# Patient Record
Sex: MALE | Race: WHITE | NOT HISPANIC OR LATINO | Employment: OTHER | URBAN - METROPOLITAN AREA
[De-identification: names, ages, dates, MRNs, and addresses within clinical notes are randomized per-mention and may not be internally consistent; named-entity substitution may affect disease eponyms.]

---

## 2017-02-08 ENCOUNTER — OFFICE VISIT (OUTPATIENT)
Dept: CARDIOLOGY | Facility: MEDICAL CENTER | Age: 67
End: 2017-02-08
Payer: MEDICARE

## 2017-02-08 VITALS
WEIGHT: 288 LBS | HEIGHT: 74 IN | DIASTOLIC BLOOD PRESSURE: 70 MMHG | SYSTOLIC BLOOD PRESSURE: 132 MMHG | HEART RATE: 72 BPM | BODY MASS INDEX: 36.96 KG/M2

## 2017-02-08 DIAGNOSIS — E78.5 DYSLIPIDEMIA: ICD-10-CM

## 2017-02-08 DIAGNOSIS — I10 ESSENTIAL HYPERTENSION: ICD-10-CM

## 2017-02-08 DIAGNOSIS — R93.1 ELEVATED CORONARY ARTERY CALCIUM SCORE: ICD-10-CM

## 2017-02-08 PROCEDURE — 99214 OFFICE O/P EST MOD 30 MIN: CPT | Performed by: INTERNAL MEDICINE

## 2017-02-08 PROCEDURE — 4040F PNEUMOC VAC/ADMIN/RCVD: CPT | Mod: 8P | Performed by: INTERNAL MEDICINE

## 2017-02-08 PROCEDURE — G8432 DEP SCR NOT DOC, RNG: HCPCS | Performed by: INTERNAL MEDICINE

## 2017-02-08 PROCEDURE — G8419 CALC BMI OUT NRM PARAM NOF/U: HCPCS | Performed by: INTERNAL MEDICINE

## 2017-02-08 PROCEDURE — G8484 FLU IMMUNIZE NO ADMIN: HCPCS | Performed by: INTERNAL MEDICINE

## 2017-02-08 PROCEDURE — 3017F COLORECTAL CA SCREEN DOC REV: CPT | Mod: 8P | Performed by: INTERNAL MEDICINE

## 2017-02-08 PROCEDURE — 1036F TOBACCO NON-USER: CPT | Performed by: INTERNAL MEDICINE

## 2017-02-08 PROCEDURE — 1101F PT FALLS ASSESS-DOCD LE1/YR: CPT | Mod: 8P | Performed by: INTERNAL MEDICINE

## 2017-02-08 PROCEDURE — G8598 ASA/ANTIPLAT THER USED: HCPCS | Performed by: INTERNAL MEDICINE

## 2017-02-08 NOTE — PROGRESS NOTES
Subjective:   Davie Rodriguez is a 66 y.o. male who presents today for followup of his hypertension and hyperlipidemia. He also has diabetes, obstructive sleep apnea and mild renal insufficiency.    His blood pressures at home and been fairly labile. They generally run approximately 130-160/70-80. However, he's had blood pressures as high as 190/124 and as low as 110/70.    He's noted no chest discomfort, dyspnea, edema, palpitations or lightheadedness.    Past Medical History   Diagnosis Date   • Diabetes    • Hypertension    • Renal disorder      STONES   • Hyperlipidemia      Past Surgical History   Procedure Laterality Date   • Other 1995     GALL BLADDER REMOVED   • Recovery  6/21/2010     Performed by SURGERY, IR-RECOVERY at SURGERY SAME DAY [facility] ORS   • Percutaneous nephrostolithotomy  6/23/2010     Performed by FOX GARCIA at SURGERY [facility]     Family History   Problem Relation Age of Onset   • Heart Attack Father 57   • Other Sister 72     diabetes     History   Smoking status   • Never Smoker    Smokeless tobacco   • Never Used     Allergies   Allergen Reactions   • Penicillins Swelling     Outpatient Encounter Prescriptions as of 2/8/2017   Medication Sig Dispense Refill   • enalapril (VASOTEC) 10 MG Tab Take 1 Tab by mouth 2 times a day. 180 Tab 3   • atorvastatin (LIPITOR) 40 MG Tab Take 1 Tab by mouth every evening. 90 Tab 3   • omega-3 acid ethyl esters (LOVAZA) 1 GM capsule Take 2 capsules twice daily. 360 Cap 3   • fenofibrate (TRICOR) 145 MG Tab Take 1 Tab by mouth every day. 90 Tab 3   • potassium citrate SR (UROCIT-K SR) 10 MEQ (1080 MG) Tab CR Take 2 Tabs by mouth 3 times a day. 550 Tab 3   • carvedilol (COREG) 25 MG Tab Take 1 Tab by mouth 2 times a day, with meals. 180 Tab 3   • nortriptyline (PAMELOR) 25 MG Cap Take 25 mg by mouth every evening.     • Insulin Glargine (LANTUS SC) Inject  as instructed.     • Testosterone (AXIRON TD) Apply  to skin as directed.   "   • finasteride (PROSCAR) 5 MG TABS Take 5 mg by mouth every day.     • Exenatide (BYDUREON SC) Inject  as instructed. Q 7 DAYS     • aspirin 81 MG tablet Take 81 mg by mouth every day.     • glimepiride (AMARYL) 4 MG TABS Take 4 mg by mouth 2 Times a Day.     • Cholecalciferol (VITAMIN D) 2000 UNIT TABS Take  by mouth every day.     • NON SPECIFIED PARTH LOPIC ACID 200MG DAILY        No facility-administered encounter medications on file as of 2/8/2017.     ROS       Objective:   /70 mmHg  Pulse 72  Ht 1.88 m (6' 2\")  Wt 130.636 kg (288 lb)  BMI 36.96 kg/m2    Physical Exam   Neck: No JVD present.   Cardiovascular: Normal rate and regular rhythm.  Exam reveals no gallop.    No murmur heard.  Pulmonary/Chest: Effort normal. He has no rales.   Abdominal: Soft. There is no tenderness.   Musculoskeletal: He exhibits no edema.     Laboratory from Dr. Houston's office LDL 52.    Laboratory from October 27: BUN 31, creatinine 1.66 and GFR 42. LFTs were within normal limits.    Coronary calcium score:  Coronary calcification from March 2015:  LMA - 12.8  LCX - 31.8  LAD - 140.4  RCA - 67.0  PDA - 0.0  Calcium score:  252.0        Assessment:     1. Elevated coronary artery calcium score: 250 in March 2015     2. Essential hypertension     3. Dyslipidemia           Medical Decision Making:  Today's Assessment / Status / Plan:     Mr. Rodriguez is clinically stable. His blood pressure is quite labile. We did check his blood pressure device and it appears to be accurate. I would like to check his blood pressures on the right arm. We discussed how to do this in detail. If he has a blood pressure greater than 170, I would like him to repeated it on a couple of occasions over the next 10 minutes. He will bring his blood pressure cuff in at the time of his next visit and I will check it personally. His lipid status has been under good control and he is going to have it rechecked in March. He is asked to bring a copy of " that lab work in for review. He does have an elevated coronary calcium score but is asymptomatic from a cardiovascular standpoint. He will follow-up in about 3 months.

## 2017-02-08 NOTE — MR AVS SNAPSHOT
"        Davie Rodriguez   2017 10:45 AM   Office Visit   MRN: [id number]    Department:  [facility]   Dept Phone:  [phone]    Description:  Male : 1950   Provider:  Wesley Sewell M.D.           Reason for Visit     Follow-Up pt. stated he had lab work done at [facility].      Allergies as of 2017     Allergen Noted Reactions    Penicillins 2010   Swelling      You were diagnosed with     Elevated coronary artery calcium score   [0882570]       Essential hypertension   [7173805]       Dyslipidemia   [144810]         Vital Signs     Blood Pressure Pulse Height Weight Body Mass Index Smoking Status    132/70 mmHg 72 1.88 m (6' 2\") 130.636 kg (288 lb) 36.96 kg/m2 Never Smoker       Basic Information     Date Of Birth Sex Race Ethnicity Preferred Language    1950 Male White Non- English      Problem List              ICD-10-CM Priority Class Noted - Resolved    Dyslipidemia E78.5   2014 - Present    History of obstructive sleep apnea: History of surgery in approximately  G47.33   2014 - Present    CKD (chronic kidney disease) stage 3, GFR 30-59 ml/min N18.3   2015 - Present    Type 2 diabetes mellitus without complication (CMS-HCC) E11.9   2015 - Present    Essential hypertension I10   2016 - Present    Elevated coronary artery calcium score: 250 in 2015 I25.10   2016 - Present      Health Maintenance        Date Due Completion Dates    A1C SCREENING 1949 ---    DIABETES MONOFILAMENT / LE EXAM 1949 ---    RETINAL SCREENING 1966 ---    URINE ACR / MICROALBUMIN 1966 ---    IMM DTaP/Tdap/Td Vaccine (1 - Tdap) 1967 ---    COLONOSCOPY 1998 ---    IMM ZOSTER VACCINE 2008 ---    IMM PNEUMOCOCCAL 65+ (ADULT) LOW/MEDIUM RISK SERIES (1 of 2 - PCV13) 2013 ---    IMM INFLUENZA (1) 2016 ---    FASTING LIPID PROFILE 2017, 2016    SERUM CREATININE 2017, 8/15/2013, " 6/24/2010            Current Immunizations     No immunizations on file.      Below and/or attached are the medications your provider expects you to take. Review all of your home medications and newly ordered medications with your provider and/or pharmacist. Follow medication instructions as directed by your provider and/or pharmacist. Please keep your medication list with you and share with your provider. Update the information when medications are discontinued, doses are changed, or new medications (including over-the-counter products) are added; and carry medication information at all times in the event of emergency situations     Allergies:  PENICILLINS - Swelling               Medications  Valid as of: February 08, 2017 - 11:03 AM    Generic Name Brand Name Tablet Size Instructions for use    Aspirin (Tab) aspirin 81 MG Take 81 mg by mouth every day.        Atorvastatin Calcium (Tab) LIPITOR 40 MG Take 1 Tab by mouth every evening.        Carvedilol (Tab) COREG 25 MG Take 1 Tab by mouth 2 times a day, with meals.        Cholecalciferol (Tab) vitamin D 2000 UNIT Take  by mouth every day.        Enalapril Maleate (Tab) VASOTEC 10 MG Take 1 Tab by mouth 2 times a day.        Exenatide   Inject  as instructed. Q 7 DAYS        Fenofibrate (Tab) TRICOR 145 MG Take 1 Tab by mouth every day.        Finasteride (Tab) PROSCAR 5 MG Take 5 mg by mouth every day.        Glimepiride (Tab) AMARYL 4 MG Take 4 mg by mouth 2 Times a Day.        Insulin Glargine   Inject  as instructed.        NON SPECIFIED   PARTH LOPIC ACID 200MG DAILY         Nortriptyline HCl (Cap) PAMELOR 25 MG Take 25 mg by mouth every evening.        Omega-3-acid Ethyl Esters (Cap) LOVAZA 1 GM Take 2 capsules twice daily.        Potassium Citrate (Tab CR) UROCIT-K SR 10 MEQ (1080 MG) Take 2 Tabs by mouth 3 times a day.        Testosterone   Apply  to skin as directed.        .                 Medicines prescribed today were sent to:     [facility]  [id number] - [place], [place] - [address]    [address] [place] [address]    Phone: [phone] Fax: [phone]    Open 24 Hours?: No      Medication refill instructions:       If your prescription bottle indicates you have medication refills left, it is not necessary to call your provider’s office. Please contact your pharmacy and they will refill your medication.    If your prescription bottle indicates you do not have any refills left, you may request refills at any time through one of the following ways: The online [employer] system (except Urgent Care), by calling your provider’s office, or by asking your pharmacy to contact your provider’s office with a refill request. Medication refills are processed only during regular business hours and may not be available until the next business day. Your provider may request additional information or to have a follow-up visit with you prior to refilling your medication.   *Please Note: Medication refills are assigned a new Rx number when refilled electronically. Your pharmacy may indicate that no refills were authorized even though a new prescription for the same medication is available at the pharmacy. Please request the medicine by name with the pharmacy before contacting your provider for a refill.           MyChart Status: Patient Declined

## 2017-02-08 NOTE — Clinical Note
[facility]   [address]  [place] [address]  Phone: [phone]  Fax: [phone]              Davie Rodriguez  1950    Encounter Date: 2/8/2017    Wesley Sewell M.D.          PROGRESS NOTE:  Subjective:   Davie Rodriguez is a 66 y.o. male who presents today for followup of his hypertension and hyperlipidemia. He also has diabetes, obstructive sleep apnea and mild renal insufficiency.    His blood pressures at home and been fairly labile. They generally run approximately 130-160/70-80. However, he's had blood pressures as high as 190/124 and as low as 110/70.    He's noted no chest discomfort, dyspnea, edema, palpitations or lightheadedness.    Past Medical History   Diagnosis Date   • Diabetes    • Hypertension    • Renal disorder      STONES   • Hyperlipidemia      Past Surgical History   Procedure Laterality Date   • Other 1995     GALL BLADDER REMOVED   • Recovery  6/21/2010     Performed by SURGERY, IR-RECOVERY at SURGERY SAME DAY [facility] ORS   • Percutaneous nephrostolithotomy  6/23/2010     Performed by FOX GARCIA at SURGERY [facility] ORS     Family History   Problem Relation Age of Onset   • Heart Attack Father 57   • Other Sister 72     diabetes     History   Smoking status   • Never Smoker    Smokeless tobacco   • Never Used     Allergies   Allergen Reactions   • Penicillins Swelling     Outpatient Encounter Prescriptions as of 2/8/2017   Medication Sig Dispense Refill   • enalapril (VASOTEC) 10 MG Tab Take 1 Tab by mouth 2 times a day. 180 Tab 3   • atorvastatin (LIPITOR) 40 MG Tab Take 1 Tab by mouth every evening. 90 Tab 3   • omega-3 acid ethyl esters (LOVAZA) 1 GM capsule Take 2 capsules twice daily. 360 Cap 3   • fenofibrate (TRICOR) 145 MG Tab Take 1 Tab by mouth every day. 90 Tab 3   • potassium citrate SR (UROCIT-K SR) 10 MEQ (1080 MG) Tab CR Take 2 Tabs by mouth 3 times a day. 550 Tab 3   •  "carvedilol (COREG) 25 MG Tab Take 1 Tab by mouth 2 times a day, with meals. 180 Tab 3   • nortriptyline (PAMELOR) 25 MG Cap Take 25 mg by mouth every evening.     • Insulin Glargine (LANTUS SC) Inject  as instructed.     • Testosterone (AXIRON TD) Apply  to skin as directed.     • finasteride (PROSCAR) 5 MG TABS Take 5 mg by mouth every day.     • Exenatide (BYDUREON SC) Inject  as instructed. Q 7 DAYS     • aspirin 81 MG tablet Take 81 mg by mouth every day.     • glimepiride (AMARYL) 4 MG TABS Take 4 mg by mouth 2 Times a Day.     • Cholecalciferol (VITAMIN D) 2000 UNIT TABS Take  by mouth every day.     • NON SPECIFIED PARTH LOPIC ACID 200MG DAILY        No facility-administered encounter medications on file as of 2/8/2017.     ROS       Objective:   /70 mmHg  Pulse 72  Ht 1.88 m (6' 2\")  Wt 130.636 kg (288 lb)  BMI 36.96 kg/m2    Physical Exam   Neck: No JVD present.   Cardiovascular: Normal rate and regular rhythm.  Exam reveals no gallop.    No murmur heard.  Pulmonary/Chest: Effort normal. He has no rales.   Abdominal: Soft. There is no tenderness.   Musculoskeletal: He exhibits no edema.     Laboratory from Dr. Houston's office LDL 52.    Laboratory from October 27: BUN 31, creatinine 1.66 and GFR 42. LFTs were within normal limits.    Coronary calcium score:  Coronary calcification from March 2015:  LMA - 12.8  LCX - 31.8  LAD - 140.4  RCA - 67.0  PDA - 0.0  Calcium score:  252.0        Assessment:     1. Elevated coronary artery calcium score: 250 in March 2015     2. Essential hypertension     3. Dyslipidemia           Medical Decision Making:  Today's Assessment / Status / Plan:     Mr. Rodriguez is clinically stable. His blood pressure is quite labile. We did check his blood pressure device and it appears to be accurate. I would like to check his blood pressures on the right arm. We discussed how to do this in detail. If he has a blood pressure greater than 170, I would like him to repeated it on " a couple of occasions over the next 10 minutes. He will bring his blood pressure cuff in at the time of his next visit and I will check it personally. His lipid status has been under good control and he is going to have it rechecked in March. He is asked to bring a copy of that lab work in for review. He does have an elevated coronary calcium score but is asymptomatic from a cardiovascular standpoint. He will follow-up in about 3 months.      Jessica Hussein M.D.  [facility] [address] [place] [id number]  VIA Facsimile: [phone]

## 2017-04-20 ENCOUNTER — OFFICE VISIT (OUTPATIENT)
Dept: CARDIOLOGY | Facility: MEDICAL CENTER | Age: 67
End: 2017-04-20
Payer: MEDICARE

## 2017-04-20 VITALS
HEIGHT: 74 IN | WEIGHT: 291 LBS | HEART RATE: 72 BPM | BODY MASS INDEX: 37.35 KG/M2 | DIASTOLIC BLOOD PRESSURE: 72 MMHG | SYSTOLIC BLOOD PRESSURE: 152 MMHG

## 2017-04-20 DIAGNOSIS — I10 ESSENTIAL HYPERTENSION: ICD-10-CM

## 2017-04-20 DIAGNOSIS — R93.1 ELEVATED CORONARY ARTERY CALCIUM SCORE: ICD-10-CM

## 2017-04-20 DIAGNOSIS — G47.34 NOCTURNAL HYPOXEMIA: ICD-10-CM

## 2017-04-20 DIAGNOSIS — E78.5 DYSLIPIDEMIA: ICD-10-CM

## 2017-04-20 PROCEDURE — 99214 OFFICE O/P EST MOD 30 MIN: CPT | Performed by: INTERNAL MEDICINE

## 2017-04-20 PROCEDURE — 1101F PT FALLS ASSESS-DOCD LE1/YR: CPT | Mod: 8P | Performed by: INTERNAL MEDICINE

## 2017-04-20 PROCEDURE — 1036F TOBACCO NON-USER: CPT | Performed by: INTERNAL MEDICINE

## 2017-04-20 PROCEDURE — G8432 DEP SCR NOT DOC, RNG: HCPCS | Performed by: INTERNAL MEDICINE

## 2017-04-20 PROCEDURE — 3017F COLORECTAL CA SCREEN DOC REV: CPT | Mod: 8P | Performed by: INTERNAL MEDICINE

## 2017-04-20 PROCEDURE — G8598 ASA/ANTIPLAT THER USED: HCPCS | Performed by: INTERNAL MEDICINE

## 2017-04-20 PROCEDURE — G8419 CALC BMI OUT NRM PARAM NOF/U: HCPCS | Performed by: INTERNAL MEDICINE

## 2017-04-20 PROCEDURE — 4040F PNEUMOC VAC/ADMIN/RCVD: CPT | Mod: 8P | Performed by: INTERNAL MEDICINE

## 2017-04-20 RX ORDER — HYDROCHLOROTHIAZIDE 12.5 MG/1
12.5 TABLET ORAL DAILY
Qty: 30 TAB | Refills: 11 | Status: SHIPPED | OUTPATIENT
Start: 2017-04-20 | End: 2018-03-12

## 2017-04-20 RX ORDER — PIOGLITAZONEHYDROCHLORIDE 30 MG/1
30 TABLET ORAL DAILY
COMMUNITY
End: 2017-07-11

## 2017-04-20 RX ORDER — ACETAMINOPHEN 160 MG
4000 TABLET,DISINTEGRATING ORAL DAILY
COMMUNITY
End: 2018-03-12

## 2017-04-20 NOTE — MR AVS SNAPSHOT
"        Davie Rodriguez   2017 7:30 AM   Office Visit   MRN: [id number]    Department:  [facility]   Dept Phone:  [phone]    Description:  Male : 1950   Provider:  Wesley Sewell M.D.           Reason for Visit     Follow-Up           Allergies as of 2017     Allergen Noted Reactions    Penicillins 2010   Swelling      You were diagnosed with     Elevated coronary artery calcium score   [7568598]       Essential hypertension   [8899925]       Dyslipidemia   [318359]       Nocturnal hypoxemia   [675577]         Vital Signs     Blood Pressure Pulse Height Weight Body Mass Index Smoking Status    152/72 mmHg 72 1.88 m (6' 2\") 131.997 kg (291 lb) 37.35 kg/m2 Never Smoker       Basic Information     Date Of Birth Sex Race Ethnicity Preferred Language    1950 Male White Non- English      Problem List              ICD-10-CM Priority Class Noted - Resolved    Dyslipidemia E78.5   2014 - Present    History of obstructive sleep apnea: History of surgery in approximately  G47.33   2014 - Present    CKD (chronic kidney disease) stage 3, GFR 30-59 ml/min N18.3   2015 - Present    Type 2 diabetes mellitus without complication (CMS-[facility]) E11.9   2015 - Present    Essential hypertension I10   2016 - Present    Elevated coronary artery calcium score: 250 in 2015 I25.10   2016 - Present      Health Maintenance        Date Due Completion Dates    A1C SCREENING 1949 ---    DIABETES MONOFILAMENT / LE EXAM 1949 ---    RETINAL SCREENING 1966 ---    URINE ACR / MICROALBUMIN 1966 ---    IMM DTaP/Tdap/Td Vaccine (1 - Tdap) 1967 ---    COLONOSCOPY 1998 ---    IMM ZOSTER VACCINE 2008 ---    IMM PNEUMOCOCCAL 65+ (ADULT) LOW/MEDIUM RISK SERIES (1 of 2 - PCV13) 2013 ---    FASTING LIPID PROFILE 2017, 2016    SERUM CREATININE 2017, 8/15/2013, 2010            Current Immunizations   "    No immunizations on file.      Below and/or attached are the medications your provider expects you to take. Review all of your home medications and newly ordered medications with your provider and/or pharmacist. Follow medication instructions as directed by your provider and/or pharmacist. Please keep your medication list with you and share with your provider. Update the information when medications are discontinued, doses are changed, or new medications (including over-the-counter products) are added; and carry medication information at all times in the event of emergency situations     Allergies:  PENICILLINS - Swelling               Medications  Valid as of: April 20, 2017 -  8:02 AM    Generic Name Brand Name Tablet Size Instructions for use    Aspirin (Tab) aspirin 81 MG Take 81 mg by mouth 2 times a day.        Atorvastatin Calcium (Tab) LIPITOR 40 MG Take 1 Tab by mouth every evening.        Carvedilol (Tab) COREG 25 MG Take 1 Tab by mouth 2 times a day, with meals.        Cholecalciferol (Tab) vitamin D 2000 UNIT Take  by mouth every day.        Cholecalciferol (Cap) Vitamin D3 2000 UNIT Take 4,000 Units by mouth every day.        Dulaglutide   Inject  as instructed.        Enalapril Maleate (Tab) VASOTEC 10 MG Take 1 Tab by mouth 2 times a day.        Exenatide   Inject  as instructed. Q 7 DAYS        Fenofibrate (Tab) TRICOR 145 MG Take 1 Tab by mouth every day.        Finasteride (Tab) PROSCAR 5 MG Take 5 mg by mouth every day.        Glimepiride (Tab) AMARYL 4 MG Take 4 mg by mouth 2 Times a Day.        HydroCHLOROthiazide (Tab) HYDRODIURIL 12.5 MG Take 1 Tab by mouth every day.        Insulin Glargine   Inject  as instructed.        NON SPECIFIED   PARTH LOPIC ACID 200MG DAILY         Nortriptyline HCl (Cap) PAMELOR 25 MG Take 25 mg by mouth every evening.        Omega-3-acid Ethyl Esters (Cap) LOVAZA 1 GM Take 2 capsules twice daily.        Pioglitazone HCl (Tab) ACTOS 30 MG Take 30 mg by mouth every  day.        Potassium Citrate (Tab CR) UROCIT-K SR 10 MEQ (1080 MG) Take 2 Tabs by mouth 3 times a day.        Testosterone   Apply  to skin as directed.        .                 Medicines prescribed today were sent to:     [id number], [place] - [address]    [address] [place] [address]    Phone: [phone] Fax: [phone]    Open 24 Hours?: No      Medication refill instructions:       If your prescription bottle indicates you have medication refills left, it is not necessary to call your provider’s office. Please contact your pharmacy and they will refill your medication.    If your prescription bottle indicates you do not have any refills left, you may request refills at any time through one of the following ways: The online [employer] system (except Urgent Care), by calling your provider’s office, or by asking your pharmacy to contact your provider’s office with a refill request. Medication refills are processed only during regular business hours and may not be available until the next business day. Your provider may request additional information or to have a follow-up visit with you prior to refilling your medication.   *Please Note: Medication refills are assigned a new Rx number when refilled electronically. Your pharmacy may indicate that no refills were authorized even though a new prescription for the same medication is available at the pharmacy. Please request the medicine by name with the pharmacy before contacting your provider for a refill.        Your To Do List     Future Labs/Procedures Complete By Expires    COMP METABOLIC PANEL  As directed 4/20/2018    LDL, DIRECT  As directed 4/20/2018    LIPID PROFILE  As directed 4/20/2018      Referral     A referral request has been sent to our patient care coordination department. Please allow 3-5 business days for us to process this request and contact you either by phone or mail. If you do  not hear from us by the 5th business day, please call us at [phone].           MyChart Status: Patient Declined

## 2017-04-20 NOTE — Clinical Note
[facility]   [address], [facility] [address]  [place] [address]  Phone: [phone]  Fax: [phone]              Davie Rodriguez  1950    Encounter Date: 4/20/2017    Wesley Sewell M.D.          PROGRESS NOTE:  Subjective:   Davie Rodriguez is a 66 y.o. male who presents today for followup of his hypertension and hyperlipidemia. He also has diabetes, obstructive sleep apnea and mild renal insufficiency.    His blood pressures at home and been fairly labile. His blood pressures run anywhere from approximately 120-185/65-90. In general systolic pressures are running in the 130s to 150s.    He's noted no chest discomfort, dyspnea, palpitations or lightheadedness. He does have some chronic edema.    Past Medical History   Diagnosis Date   • Diabetes    • Hypertension    • Renal disorder      STONES   • Hyperlipidemia      Past Surgical History   Procedure Laterality Date   • Other 1995     GALL BLADDER REMOVED   • Recovery  6/21/2010     Performed by SURGERY, IR-RECOVERY at SURGERY SAME DAY [facility] ORS   • Percutaneous nephrostolithotomy  6/23/2010     Performed by FOX GARCIA at SURGERY [facility] ORS     Family History   Problem Relation Age of Onset   • Heart Attack Father 57   • Other Sister 72     diabetes     History   Smoking status   • Never Smoker    Smokeless tobacco   • Never Used     Allergies   Allergen Reactions   • Penicillins Swelling     Outpatient Encounter Prescriptions as of 4/20/2017   Medication Sig Dispense Refill   • Cholecalciferol (VITAMIN D3) 2000 UNIT Cap Take 4,000 Units by mouth every day.     • Dulaglutide (TRULICITY SC) Inject  as instructed.     • pioglitazone (ACTOS) 30 MG Tab Take 30 mg by mouth every day.     • enalapril (VASOTEC) 10 MG Tab Take 1 Tab by mouth 2 times a day. 180 Tab 3   • atorvastatin (LIPITOR) 40 MG Tab Take 1 Tab by mouth every evening. 90 Tab 3   • omega-3 acid ethyl esters (LOVAZA) 1  "GM capsule Take 2 capsules twice daily. 360 Cap 3   • fenofibrate (TRICOR) 145 MG Tab Take 1 Tab by mouth every day. 90 Tab 3   • potassium citrate SR (UROCIT-K SR) 10 MEQ (1080 MG) Tab CR Take 2 Tabs by mouth 3 times a day. 550 Tab 3   • carvedilol (COREG) 25 MG Tab Take 1 Tab by mouth 2 times a day, with meals. 180 Tab 3   • nortriptyline (PAMELOR) 25 MG Cap Take 25 mg by mouth every evening.     • Insulin Glargine (LANTUS SC) Inject  as instructed.     • finasteride (PROSCAR) 5 MG TABS Take 5 mg by mouth every day.     • aspirin 81 MG tablet Take 81 mg by mouth 2 times a day.     • glimepiride (AMARYL) 4 MG TABS Take 4 mg by mouth 2 Times a Day.     • NON SPECIFIED PARTH LOPIC ACID 200MG DAILY      • Testosterone (AXIRON TD) Apply  to skin as directed.     • Exenatide (BYDUREON SC) Inject  as instructed. Q 7 DAYS     • Cholecalciferol (VITAMIN D) 2000 UNIT TABS Take  by mouth every day.       No facility-administered encounter medications on file as of 4/20/2017.     ROS       Objective:   /72 mmHg  Pulse 72  Ht 1.88 m (6' 2\")  Wt 131.997 kg (291 lb)  BMI 37.35 kg/m2    Physical Exam   Neck: No JVD present.   Cardiovascular: Normal rate and regular rhythm.  Exam reveals no gallop.    No murmur heard.  Pulmonary/Chest: Effort normal. He has no rales.   Abdominal: Soft. There is no tenderness.   Musculoskeletal: He exhibits edema (1-2+ pretibial).     Laboratory from Dr. Houston's office LDL 52.    Laboratory from October 27: BUN 31, creatinine 1.66 and GFR 42. LFTs were within normal limits.    Coronary calcium score:  Coronary calcification from March 2015:  LMA - 12.8  LCX - 31.8  LAD - 140.4  RCA - 67.0  PDA - 0.0  Calcium score:  252.0    Lab Results   Component Value Date/Time    CHOLESTEROL, 04/19/2016 11:10 AM    LDL Comment 04/19/2016 11:10 AM    HDL 24* 04/19/2016 11:10 AM    TRIGLYCERIDES 666* 04/19/2016 11:10 AM       Lab Results   Component Value Date/Time    SODIUM 142 04/19/2016 11:10 " AM    POTASSIUM 4.8 04/19/2016 11:10 AM    CHLORIDE 104 04/19/2016 11:10 AM    CO2 22 04/19/2016 11:10 AM    GLUCOSE 207* 04/19/2016 11:10 AM    BUN 21 04/19/2016 11:10 AM    CREATININE 1.35* 04/19/2016 11:10 AM    BUN-CREATININE RATIO 16 04/19/2016 11:10 AM     Lab Results   Component Value Date/Time    ALKALINE PHOSPHATASE 61 04/19/2016 11:10 AM    AST(SGOT) 13 04/19/2016 11:10 AM    ALT(SGPT) 27 04/19/2016 11:10 AM    TOTAL BILIRUBIN 0.4 04/19/2016 11:10 AM      No results found for: BNPBTYPENAT       Assessment:     1. Elevated coronary artery calcium score: 250 in March 2015     2. Essential hypertension     3. Dyslipidemia           Medical Decision Making:  Today's Assessment / Status / Plan:     Mr. Rodriguez is clinically stable. His blood pressure is still labile. At this time, we will add HCTZ 12.5 mg daily. In addition, he did have a overnight pulse oximetry in November. This showed significant desaturations. We will refer him to pulmonary for reevaluation of his obstructive sleep apnea which could be contributory to his hypertension. His triglycerides are significantly elevated. He is already on fenofibrate and omega-3 fatty acids. We discussed better dietary compliance and weight loss. We will obtain a direct LDL when he follows up in a few months.      Jessica Hussein M.D.  [address] [address]  VIA Facsimile: [phone]

## 2017-04-20 NOTE — PROGRESS NOTES
Subjective:   Davie Rodriguez is a 66 y.o. male who presents today for followup of his hypertension and hyperlipidemia. He also has diabetes, obstructive sleep apnea and mild renal insufficiency.    His blood pressures at home and been fairly labile. His blood pressures run anywhere from approximately 120-185/65-90. In general systolic pressures are running in the 130s to 150s.    He's noted no chest discomfort, dyspnea, palpitations or lightheadedness. He does have some chronic edema.    Past Medical History   Diagnosis Date   • Diabetes    • Hypertension    • Renal disorder      STONES   • Hyperlipidemia      Past Surgical History   Procedure Laterality Date   • Other 1995     GALL BLADDER REMOVED   • Recovery  6/21/2010     Performed by SURGERY, IR-RECOVERY at SURGERY SAME DAY [facility] ORS   • Percutaneous nephrostolithotomy  6/23/2010     Performed by FOX GARCIA at SURGERY [facility]     Family History   Problem Relation Age of Onset   • Heart Attack Father 57   • Other Sister 72     diabetes     History   Smoking status   • Never Smoker    Smokeless tobacco   • Never Used     Allergies   Allergen Reactions   • Penicillins Swelling     Outpatient Encounter Prescriptions as of 4/20/2017   Medication Sig Dispense Refill   • Cholecalciferol (VITAMIN D3) 2000 UNIT Cap Take 4,000 Units by mouth every day.     • Dulaglutide (TRULICITY SC) Inject  as instructed.     • pioglitazone (ACTOS) 30 MG Tab Take 30 mg by mouth every day.     • enalapril (VASOTEC) 10 MG Tab Take 1 Tab by mouth 2 times a day. 180 Tab 3   • atorvastatin (LIPITOR) 40 MG Tab Take 1 Tab by mouth every evening. 90 Tab 3   • omega-3 acid ethyl esters (LOVAZA) 1 GM capsule Take 2 capsules twice daily. 360 Cap 3   • fenofibrate (TRICOR) 145 MG Tab Take 1 Tab by mouth every day. 90 Tab 3   • potassium citrate SR (UROCIT-K SR) 10 MEQ (1080 MG) Tab CR Take 2 Tabs by mouth 3 times a day. 550 Tab 3   • carvedilol (COREG) 25 MG Tab  "Take 1 Tab by mouth 2 times a day, with meals. 180 Tab 3   • nortriptyline (PAMELOR) 25 MG Cap Take 25 mg by mouth every evening.     • Insulin Glargine (LANTUS SC) Inject  as instructed.     • finasteride (PROSCAR) 5 MG TABS Take 5 mg by mouth every day.     • aspirin 81 MG tablet Take 81 mg by mouth 2 times a day.     • glimepiride (AMARYL) 4 MG TABS Take 4 mg by mouth 2 Times a Day.     • NON SPECIFIED PARTH LOPIC ACID 200MG DAILY      • Testosterone (AXIRON TD) Apply  to skin as directed.     • Exenatide (BYDUREON SC) Inject  as instructed. Q 7 DAYS     • Cholecalciferol (VITAMIN D) 2000 UNIT TABS Take  by mouth every day.       No facility-administered encounter medications on file as of 4/20/2017.     ROS       Objective:   /72 mmHg  Pulse 72  Ht 1.88 m (6' 2\")  Wt 131.997 kg (291 lb)  BMI 37.35 kg/m2    Physical Exam   Neck: No JVD present.   Cardiovascular: Normal rate and regular rhythm.  Exam reveals no gallop.    No murmur heard.  Pulmonary/Chest: Effort normal. He has no rales.   Abdominal: Soft. There is no tenderness.   Musculoskeletal: He exhibits edema (1-2+ pretibial).     Laboratory from Dr. Houston's office LDL 52.    Laboratory from October 27: BUN 31, creatinine 1.66 and GFR 42. LFTs were within normal limits.    Coronary calcium score:  Coronary calcification from March 2015:  LMA - 12.8  LCX - 31.8  LAD - 140.4  RCA - 67.0  PDA - 0.0  Calcium score:  252.0    Lab Results   Component Value Date/Time    CHOLESTEROL, 04/19/2016 11:10 AM    LDL Comment 04/19/2016 11:10 AM    HDL 24* 04/19/2016 11:10 AM    TRIGLYCERIDES 666* 04/19/2016 11:10 AM       Lab Results   Component Value Date/Time    SODIUM 142 04/19/2016 11:10 AM    POTASSIUM 4.8 04/19/2016 11:10 AM    CHLORIDE 104 04/19/2016 11:10 AM    CO2 22 04/19/2016 11:10 AM    GLUCOSE 207* 04/19/2016 11:10 AM    BUN 21 04/19/2016 11:10 AM    CREATININE 1.35* 04/19/2016 11:10 AM    BUN-CREATININE RATIO 16 04/19/2016 11:10 AM     Lab " Results   Component Value Date/Time    ALKALINE PHOSPHATASE 61 04/19/2016 11:10 AM    AST(SGOT) 13 04/19/2016 11:10 AM    ALT(SGPT) 27 04/19/2016 11:10 AM    TOTAL BILIRUBIN 0.4 04/19/2016 11:10 AM      No results found for: BNPBTYPENAT       Assessment:     1. Elevated coronary artery calcium score: 250 in March 2015     2. Essential hypertension     3. Dyslipidemia           Medical Decision Making:  Today's Assessment / Status / Plan:     Mr. Rodriguez is clinically stable. His blood pressure is still labile. At this time, we will add HCTZ 12.5 mg daily. In addition, he did have a overnight pulse oximetry in November. This showed significant desaturations. We will refer him to pulmonary for reevaluation of his obstructive sleep apnea which could be contributory to his hypertension. His triglycerides are significantly elevated. He is already on fenofibrate and omega-3 fatty acids. We discussed better dietary compliance and weight loss. We will obtain a direct LDL when he follows up in a few months.

## 2017-05-09 ENCOUNTER — TELEPHONE (OUTPATIENT)
Dept: CARDIOLOGY | Facility: MEDICAL CENTER | Age: 67
End: 2017-05-09

## 2017-05-09 NOTE — TELEPHONE ENCOUNTER
Fit Bit cannot be synced with [employer].    Tried calling patient. No answer, no voicemail. Will try again later.    DELL RN

## 2017-05-09 NOTE — TELEPHONE ENCOUNTER
----- Message from Anahi Kent sent at 5/9/2017  4:27 PM PDT -----  Regarding: patient has question about fit bit  NELSON/Bessy      Patient has questions about Fit bit and if it can be synched with MyChart. He can be reached at [phone].

## 2017-05-10 NOTE — TELEPHONE ENCOUNTER
Called patient again and advised him that the fit bit does not sync with MyChart. Patient verbalized understanding and is thankful for the call.    DELL HERNÁNDEZ

## 2017-05-12 ENCOUNTER — TELEPHONE (OUTPATIENT)
Dept: CARDIOLOGY | Facility: MEDICAL CENTER | Age: 67
End: 2017-05-12

## 2017-05-12 NOTE — TELEPHONE ENCOUNTER
Called patient. He would like to know where he can purchase a fit bit or another activity band. Advised patient that he could try [employer] or online.    DELL HERNÁNDEZ

## 2017-05-12 NOTE — TELEPHONE ENCOUNTER
----- Message from Anahi Kent sent at 5/12/2017  3:02 PM PDT -----  Regarding: patient has question about activity band  NELSON/Bessy      Patient was told by Dr. Sewell to go  an activity band, and he wants to know where he is supposed to pick it up at. He can be reached at [phone].

## 2017-06-27 ENCOUNTER — APPOINTMENT (OUTPATIENT)
Dept: SLEEP MEDICINE | Facility: MEDICAL CENTER | Age: 67
End: 2017-06-27
Payer: MEDICARE

## 2017-06-28 ENCOUNTER — TELEPHONE (OUTPATIENT)
Dept: CARDIOLOGY | Facility: MEDICAL CENTER | Age: 67
End: 2017-06-28

## 2017-06-28 NOTE — TELEPHONE ENCOUNTER
----- Message from Anahi Kent sent at 6/28/2017 10:54 AM PDT -----  Regarding: patient wants pain medication  FK/Shantel        Patient said his kidney is acting up again and wants to know if Dr. Sewell will prescribe him pain medication. He can be reached at [phone].

## 2017-07-11 ENCOUNTER — SLEEP CENTER VISIT (OUTPATIENT)
Dept: SLEEP MEDICINE | Facility: MEDICAL CENTER | Age: 67
End: 2017-07-11
Payer: MEDICARE

## 2017-07-11 VITALS
WEIGHT: 282 LBS | DIASTOLIC BLOOD PRESSURE: 80 MMHG | SYSTOLIC BLOOD PRESSURE: 127 MMHG | HEIGHT: 74 IN | OXYGEN SATURATION: 93 % | HEART RATE: 69 BPM | BODY MASS INDEX: 36.19 KG/M2 | RESPIRATION RATE: 15 BRPM

## 2017-07-11 DIAGNOSIS — Z86.69 HISTORY OF OBSTRUCTIVE SLEEP APNEA: ICD-10-CM

## 2017-07-11 DIAGNOSIS — I10 ESSENTIAL HYPERTENSION: ICD-10-CM

## 2017-07-11 DIAGNOSIS — E11.9 TYPE 2 DIABETES MELLITUS WITHOUT COMPLICATION, UNSPECIFIED LONG TERM INSULIN USE STATUS: ICD-10-CM

## 2017-07-11 DIAGNOSIS — E78.5 DYSLIPIDEMIA: ICD-10-CM

## 2017-07-11 PROCEDURE — 99203 OFFICE O/P NEW LOW 30 MIN: CPT | Performed by: NURSE PRACTITIONER

## 2017-07-11 NOTE — PROGRESS NOTES
Chief Complaint   Patient presents with   • New Patient     Ref. Millie         HPI: This patient is a 66 y.o. male, who presents for evaluation and management of sleep-disordered breathing. Medical history includes HTN, HLD, obesity, BMI 36. He was referred by cardiology due to worsening hypertension.      He has a history of SHAKA, initially diagnosed 15-20 years ago. Symptoms resolved after UPPP. He was never started on CPAP. Patient adamantly denies any sleep symptoms. He tells me his wife does not notice snoring. He is denies snoring, resuscitative gasping, daytime fatigue or a.m. headaches. Denies symptoms of narcolepsy, cataplexy or restless leg. He is a fairly regular but schedule. He falls asleep easily, typically wakes a couple times per night. He has a difficult time falling asleep after the second awakening. He is a never smoker. No history of pulmonary disease.      Past Medical History   Diagnosis Date   • Diabetes    • Hypertension    • Renal disorder      STONES   • Hyperlipidemia        Social History   Substance Use Topics   • Smoking status: Never Smoker    • Smokeless tobacco: Never Used   • Alcohol Use: No       Family History   Problem Relation Age of Onset   • Heart Attack Father 57   • Other Sister 72     diabetes   • Sleep Apnea Sister        Current medications as of today   Current Outpatient Prescriptions   Medication Sig Dispense Refill   • Cholecalciferol (VITAMIN D3) 2000 UNIT Cap Take 4,000 Units by mouth every day.     • Dulaglutide (TRULICITY SC) Inject  as instructed.     • hydrochlorothiazide (HYDRODIURIL) 12.5 MG tablet Take 1 Tab by mouth every day. 30 Tab 11   • enalapril (VASOTEC) 10 MG Tab Take 1 Tab by mouth 2 times a day. 180 Tab 3   • atorvastatin (LIPITOR) 40 MG Tab Take 1 Tab by mouth every evening. 90 Tab 3   • omega-3 acid ethyl esters (LOVAZA) 1 GM capsule Take 2 capsules twice daily. 360 Cap 3   • fenofibrate (TRICOR) 145 MG Tab Take 1 Tab by mouth every day. 90 Tab 3  "  • potassium citrate SR (UROCIT-K SR) 10 MEQ (1080 MG) Tab CR Take 2 Tabs by mouth 3 times a day. 550 Tab 3   • carvedilol (COREG) 25 MG Tab Take 1 Tab by mouth 2 times a day, with meals. 180 Tab 3   • nortriptyline (PAMELOR) 25 MG Cap Take 25 mg by mouth every evening.     • Insulin Glargine (LANTUS SC) Inject  as instructed.     • finasteride (PROSCAR) 5 MG TABS Take 5 mg by mouth every day.     • aspirin 81 MG tablet Take 81 mg by mouth 2 times a day.     • glimepiride (AMARYL) 4 MG TABS Take 4 mg by mouth 2 Times a Day.     • Cholecalciferol (VITAMIN D) 2000 UNIT TABS Take  by mouth every day.     • NON SPECIFIED PARTH LOPIC ACID 200MG DAILY        No current facility-administered medications for this visit.       Allergies: Penicillins    Blood pressure 127/80, pulse 69, resp. rate 15, height 1.88 m (6' 2.02\"), weight 127.914 kg (282 lb), SpO2 93 %.      ROS:   Constitutional: Denies fevers, chills, night sweats, weight loss . Positive for fatigue only if patient was unable to sleep the night before  HEENT: Denies earache, difficulty hearing, tinnitus, nasal congestion, hoarseness  Cardiovascular: Denies chest pain, tightness, palpitations, orthopnea or edema  Respiratory: Denies cough, wheeze, dyspnea, hemoptysis  Sleep: See HPI  GI: Denies heartburn, dysphagia, nausea, abdominal pain, diarrhea or constipation  : Denies frequent urination, hematuria, discharge or painful urination  Musculoskeletal: Denies back pain, painful joints, sore muscles  Neurological: Denies weakness or headaches  Skin: No rashes    Physical exam:   Appearance: Obese, in no acute distress  HEENT: Normocephalic, atraumatic, white sclera, PERRLA  Respiratory: no intercostal retractions or accessory muscle use   Lungs auscultation: Clear to auscultation bilaterally  Cardiovascular: Regular rate rhythm no murmurs, rubs or gallops  Gait: Normal  Digits: No clubbing, cyanosis  Motor: No focal deficits  Orientation: Oriented to time, person " and place    Diagnosis:  1. Dyslipidemia     2. History of obstructive sleep apnea: History of surgery in approximately 2001  OVERNIGHT HOME SLEEP STUDY   3. Type 2 diabetes mellitus without complication, unspecified long term insulin use status (CMS-[facility])     4. Essential hypertension     5. BMI 36.0-36.9,adult         Plan:  I reviewed with the patient the pathophysiology of obstructive sleep apnea, as well as potential cardiac and neurologic risks associated with untreated sleep apnea. There is no contraindication to home sleep study.    1. Home sleep study now  2. Follow up after testing to review

## 2017-07-11 NOTE — MR AVS SNAPSHOT
"        Davie Alejandrenett   2017 3:00 PM   Sleep Center Visit   MRN: [id number]    Department:  [facility]   Dept Phone:  [phone]    Description:  Male : 1950   Provider:  CHRISTIAN Riojas           Reason for Visit     New Patient Ref. Millie      Allergies as of 2017     Allergen Noted Reactions    Penicillins 2010   Swelling      You were diagnosed with     Dyslipidemia   [307944]       History of obstructive sleep apnea   [6912581]       Type 2 diabetes mellitus without complication, unspecified long term insulin use status (CMS-[facility])   [4496809]       Essential hypertension   [9855356]       BMI 36.0-36.9,adult   [204401]         Vital Signs     Blood Pressure Pulse Respirations Height Weight Body Mass Index    127/80 mmHg 69 15 1.88 m (6' 2.02\") 127.914 kg (282 lb) 36.19 kg/m2    Oxygen Saturation Smoking Status                93% Never Smoker           Basic Information     Date Of Birth Sex Race Ethnicity Preferred Language    1950 Male White Non- English      Your appointments     2017  7:45 AM   FOLLOW UP with Wesley Sewell M.D.   [facility] (--)    [address], [address]  [place] [address] [phone]            Aug 03, 2017  2:20 PM   Sleep Study Home with SLEEP CLINIC   [facility] Sleep [facility] (--)    [address]  [place] [id number] [phone]            Aug 15, 2017  8:00 AM   Established Patient Pul with CHRISTIAN Riojas   [facility] Pulmonary Medicine (--)    [address]  [address]  [place] [address] [phone]              Problem List              ICD-10-CM Priority Class Noted - Resolved    Dyslipidemia E78.5   2014 - Present    History of obstructive sleep apnea: History of surgery in approximately  Z86.69   2014 - Present    CKD (chronic kidney disease) stage 3, GFR 30-59 ml/min N18.3   2015 - " Present    Type 2 diabetes mellitus without complication (CMS-HCC) E11.9   9/17/2015 - Present    Essential hypertension I10   1/7/2016 - Present    Elevated coronary artery calcium score: 250 in March 2015 R93.1   11/8/2016 - Present    BMI 36.0-36.9,adult Z68.36   7/11/2017 - Present      Health Maintenance        Date Due Completion Dates    A1C SCREENING 5/5/1949 ---    DIABETES MONOFILAMENT / LE EXAM 5/5/1949 ---    RETINAL SCREENING 11/5/1966 ---    URINE ACR / MICROALBUMIN 11/5/1966 ---    IMM DTaP/Tdap/Td Vaccine (1 - Tdap) 11/5/1967 ---    COLONOSCOPY 11/5/1998 ---    IMM ZOSTER VACCINE 11/5/2008 ---    IMM PNEUMOCOCCAL 65+ (ADULT) LOW/MEDIUM RISK SERIES (1 of 2 - PCV13) 11/5/2013 ---    FASTING LIPID PROFILE 4/19/2017 4/19/2016, 1/6/2016    SERUM CREATININE 4/19/2017 4/19/2016, 8/15/2013, 6/24/2010    IMM INFLUENZA (1) 9/1/2017 ---            Current Immunizations     No immunizations on file.      Below and/or attached are the medications your provider expects you to take. Review all of your home medications and newly ordered medications with your provider and/or pharmacist. Follow medication instructions as directed by your provider and/or pharmacist. Please keep your medication list with you and share with your provider. Update the information when medications are discontinued, doses are changed, or new medications (including over-the-counter products) are added; and carry medication information at all times in the event of emergency situations     Allergies:  PENICILLINS - Swelling               Medications  Valid as of: July 11, 2017 -  3:49 PM    Generic Name Brand Name Tablet Size Instructions for use    Aspirin (Tab) aspirin 81 MG Take 81 mg by mouth 2 times a day.        Atorvastatin Calcium (Tab) LIPITOR 40 MG Take 1 Tab by mouth every evening.        Carvedilol (Tab) COREG 25 MG Take 1 Tab by mouth 2 times a day, with meals.        Cholecalciferol (Tab) vitamin D 2000 UNIT Take  by mouth every  day.        Cholecalciferol (Cap) Vitamin D3 2000 UNIT Take 4,000 Units by mouth every day.        Dulaglutide   Inject  as instructed.        Enalapril Maleate (Tab) VASOTEC 10 MG Take 1 Tab by mouth 2 times a day.        Fenofibrate (Tab) TRICOR 145 MG Take 1 Tab by mouth every day.        Finasteride (Tab) PROSCAR 5 MG Take 5 mg by mouth every day.        Glimepiride (Tab) AMARYL 4 MG Take 4 mg by mouth 2 Times a Day.        HydroCHLOROthiazide (Tab) HYDRODIURIL 12.5 MG Take 1 Tab by mouth every day.        Insulin Glargine   Inject  as instructed.        NON SPECIFIED   PARTH LOPIC ACID 200MG DAILY         Nortriptyline HCl (Cap) PAMELOR 25 MG Take 25 mg by mouth every evening.        Omega-3-acid Ethyl Esters (Cap) LOVAZA 1 GM Take 2 capsules twice daily.        Potassium Citrate (Tab CR) UROCIT-K SR 10 MEQ (1080 MG) Take 2 Tabs by mouth 3 times a day.        .                 Medicines prescribed today were sent to:     [facility] [address] [place] [address] & [address] [address]    Phone: [phone] Fax: [phone]    Open 24 Hours?: No      Medication refill instructions:       If your prescription bottle indicates you have medication refills left, it is not necessary to call your provider’s office. Please contact your pharmacy and they will refill your medication.    If your prescription bottle indicates you do not have any refills left, you may request refills at any time through one of the following ways: The online En[employer] system (except Urgent Care), by calling your provider’s office, or by asking your pharmacy to contact your provider’s office with a refill request. Medication refills are processed only during regular business hours and may not be available until the next business day. Your provider may request additional information or to have a follow-up visit with you prior to refilling your medication.   *Please Note: Medication  refills are assigned a new Rx number when refilled electronically. Your pharmacy may indicate that no refills were authorized even though a new prescription for the same medication is available at the pharmacy. Please request the medicine by name with the pharmacy before contacting your provider for a refill.        Your To Do List     Future Labs/Procedures Complete By Expires    OVERNIGHT HOME SLEEP STUDY  As directed 7/11/2018         USDS Access Code: [email or web address]  Expires: 7/13/2017  9:00 AM    USDS  A secure, online tool to manage your health information     [email or web address]’s USDS® is a secure, online tool that connects you to your personalized health information from the privacy of your home -- day or night - making it very easy for you to manage your healthcare. Once the activation process is completed, you can even access your medical information using the USDS vito, which is available for free in the Apple Vito store or Google Play store.     USDS provides the following levels of access (as shown below):   My Chart Features   [facility] Doctor [facility]  Specialists [facility]  Urgent  Care Non-[facility]  Primary Care  Doctor   Email your healthcare team securely and privately 24/7 X X X    Manage appointments: schedule your next appointment; view details of past/upcoming appointments X      Request prescription refills. X      View recent personal medical records, including lab and immunizations X X X X   View health record, including health history, allergies, medications X X X X   Read reports about your outpatient visits, procedures, consult and ER notes X X X X   See your discharge summary, which is a recap of your hospital and/or ER visit that includes your diagnosis, lab results, and care plan. X X       How to register for USDS:  1. Go to  [email or web address].  2. Click on the Sign Up Now box, which takes you to the New Member Sign Up page. You will need to provide the  following information:  a. Enter your Go Dish Access Code exactly as it appears at the top of this page. (You will not need to use this code after you’ve completed the sign-up process. If you do not sign up before the expiration date, you must request a new code.)   b. Enter your date of birth.   c. Enter your home email address.   d. Click Submit, and follow the next screen’s instructions.  3. Create a Go Dish ID. This will be your Go Dish login ID and cannot be changed, so think of one that is secure and easy to remember.  4. Create a Go Dish password. You can change your password at any time.  5. Enter your Password Reset Question and Answer. This can be used at a later time if you forget your password.   6. Enter your e-mail address. This allows you to receive e-mail notifications when new information is available in Go Dish.  7. Click Sign Up. You can now view your health information.    For assistance activating your Go Dish account, call [phone]

## 2017-07-14 DIAGNOSIS — I10 ESSENTIAL HYPERTENSION: ICD-10-CM

## 2017-07-17 ENCOUNTER — TELEPHONE (OUTPATIENT)
Dept: CARDIOLOGY | Facility: MEDICAL CENTER | Age: 67
End: 2017-07-17

## 2017-07-17 NOTE — TELEPHONE ENCOUNTER
Spoke patient   He had labs done at [facility]  This morning  7/17/17  Will call later to obtained results

## 2017-07-18 LAB
ALBUMIN SERPL-MCNC: 3.9 G/DL (ref 3.6–4.8)
ALBUMIN/GLOB SERPL: 2.1 {RATIO} (ref 1.2–2.2)
ALP SERPL-CCNC: 31 IU/L (ref 39–117)
ALT SERPL-CCNC: 15 IU/L (ref 0–44)
AST SERPL-CCNC: 13 IU/L (ref 0–40)
BILIRUB SERPL-MCNC: 0.3 MG/DL (ref 0–1.2)
BUN SERPL-MCNC: 32 MG/DL (ref 8–27)
BUN/CREAT SERPL: 16 (ref 10–24)
CALCIUM SERPL-MCNC: 9.2 MG/DL (ref 8.6–10.2)
CHLORIDE SERPL-SCNC: 106 MMOL/L (ref 96–106)
CHOLEST SERPL-MCNC: 113 MG/DL (ref 100–199)
CO2 SERPL-SCNC: 21 MMOL/L (ref 18–29)
COMMENT 011824: ABNORMAL
CREAT SERPL-MCNC: 1.95 MG/DL (ref 0.76–1.27)
GLOBULIN SER CALC-MCNC: 1.9 G/DL (ref 1.5–4.5)
GLUCOSE SERPL-MCNC: 91 MG/DL (ref 65–99)
HDLC SERPL-MCNC: 25 MG/DL
LDLC SERPL CALC-MCNC: 47 MG/DL (ref 0–99)
LDLC SERPL DIRECT ASSAY-MCNC: 57 MG/DL (ref 0–99)
POTASSIUM SERPL-SCNC: 4.4 MMOL/L (ref 3.5–5.2)
PROT SERPL-MCNC: 5.8 G/DL (ref 6–8.5)
SODIUM SERPL-SCNC: 143 MMOL/L (ref 134–144)
TRIGL SERPL-MCNC: 207 MG/DL (ref 0–149)
VLDLC SERPL CALC-MCNC: 41 MG/DL (ref 5–40)

## 2017-07-18 RX ORDER — CARVEDILOL 25 MG/1
25 TABLET ORAL 2 TIMES DAILY WITH MEALS
Qty: 180 TAB | Refills: 3 | Status: SHIPPED | OUTPATIENT
Start: 2017-07-18 | End: 2018-07-10 | Stop reason: SDUPTHER

## 2017-07-20 ENCOUNTER — OFFICE VISIT (OUTPATIENT)
Dept: CARDIOLOGY | Facility: MEDICAL CENTER | Age: 67
End: 2017-07-20
Payer: MEDICARE

## 2017-07-20 VITALS
SYSTOLIC BLOOD PRESSURE: 130 MMHG | DIASTOLIC BLOOD PRESSURE: 70 MMHG | BODY MASS INDEX: 36.57 KG/M2 | OXYGEN SATURATION: 91 % | HEIGHT: 74 IN | HEART RATE: 70 BPM | WEIGHT: 285 LBS

## 2017-07-20 DIAGNOSIS — R93.1 ELEVATED CORONARY ARTERY CALCIUM SCORE: ICD-10-CM

## 2017-07-20 DIAGNOSIS — I10 ESSENTIAL HYPERTENSION: ICD-10-CM

## 2017-07-20 DIAGNOSIS — E78.5 DYSLIPIDEMIA: ICD-10-CM

## 2017-07-20 PROCEDURE — 99214 OFFICE O/P EST MOD 30 MIN: CPT | Performed by: INTERNAL MEDICINE

## 2017-07-20 NOTE — MR AVS SNAPSHOT
"        Davie Rodriguez   2017 7:45 AM   Office Visit   MRN: [id number]    Department:  [facility]   Dept Phone:  [phone]    Description:  Male : 1950   Provider:  Wesley Sewell M.D.           Reason for Visit     Follow-Up           Allergies as of 2017     Allergen Noted Reactions    Penicillins 2010   Swelling      You were diagnosed with     Elevated coronary artery calcium score   [6394005]       Essential hypertension   [9371615]       Dyslipidemia   [325288]         Vital Signs     Blood Pressure Pulse Height Weight Body Mass Index Oxygen Saturation    130/70 mmHg 70 1.88 m (6' 2\") 129.275 kg (285 lb) 36.58 kg/m2 91%    Smoking Status                   Never Smoker            Basic Information     Date Of Birth Sex Race Ethnicity Preferred Language    1950 Male White Non- English      Your appointments     Aug 03, 2017  2:20 PM   Sleep Study Home with SLEEP CLINIC   [facility] Sleep Medicine (--)    [address]  [place] [address] [phone]            Aug 15, 2017  8:00 AM   Established Patient Pul with CHRISTIAN Riojas   [facility] Pulmonary Medicine (--)    [address]  [address]  [place] [address] [phone]              Problem List              ICD-10-CM Priority Class Noted - Resolved    Dyslipidemia E78.5   2014 - Present    History of obstructive sleep apnea: History of surgery in approximately  Z86.69   2014 - Present    CKD (chronic kidney disease) stage 3, GFR 30-59 ml/min N18.3   2015 - Present    Type 2 diabetes mellitus without complication (CMS-[facility]) E11.9   2015 - Present    Essential hypertension I10   2016 - Present    Elevated coronary artery calcium score: 250 in 2015 R93.1   2016 - Present    BMI 36.0-36.9,adult Z68.36   2017 - Present      Health Maintenance        Date Due Completion Dates    A1C SCREENING 1949 ---    DIABETES " MONOFILAMENT / LE EXAM 5/5/1949 ---    RETINAL SCREENING 11/5/1966 ---    URINE ACR / MICROALBUMIN 11/5/1966 ---    IMM DTaP/Tdap/Td Vaccine (1 - Tdap) 11/5/1967 ---    COLONOSCOPY 11/5/1998 ---    IMM ZOSTER VACCINE 11/5/2008 ---    IMM PNEUMOCOCCAL 65+ (ADULT) LOW/MEDIUM RISK SERIES (1 of 2 - PCV13) 11/5/2013 ---    IMM INFLUENZA (1) 9/1/2017 ---    FASTING LIPID PROFILE 7/17/2018 7/17/2017, 4/19/2016, 1/6/2016    SERUM CREATININE 7/17/2018 7/17/2017, 4/19/2016, 8/15/2013, 6/24/2010            Current Immunizations     No immunizations on file.      Below and/or attached are the medications your provider expects you to take. Review all of your home medications and newly ordered medications with your provider and/or pharmacist. Follow medication instructions as directed by your provider and/or pharmacist. Please keep your medication list with you and share with your provider. Update the information when medications are discontinued, doses are changed, or new medications (including over-the-counter products) are added; and carry medication information at all times in the event of emergency situations     Allergies:  PENICILLINS - Swelling               Medications  Valid as of: July 20, 2017 -  8:07 AM    Generic Name Brand Name Tablet Size Instructions for use    Aspirin (Tab) aspirin 81 MG Take 81 mg by mouth 2 times a day.        Atorvastatin Calcium (Tab) LIPITOR 40 MG Take 1 Tab by mouth every evening.        Carvedilol (Tab) COREG 25 MG Take 1 Tab by mouth 2 times a day, with meals.        Cholecalciferol (Tab) vitamin D 2000 UNIT Take  by mouth every day.        Cholecalciferol (Cap) Vitamin D3 2000 UNIT Take 4,000 Units by mouth every day.        Dulaglutide   Inject  as instructed.        Enalapril Maleate (Tab) VASOTEC 10 MG Take 1 Tab by mouth 2 times a day.        Fenofibrate (Tab) TRICOR 145 MG Take 1 Tab by mouth every day.        Finasteride (Tab) PROSCAR 5 MG Take 5 mg by mouth every day.         Glimepiride (Tab) AMARYL 4 MG Take 4 mg by mouth 2 Times a Day.        HydroCHLOROthiazide (Tab) HYDRODIURIL 12.5 MG Take 1 Tab by mouth every day.        Insulin Glargine   Inject  as instructed.        NON SPECIFIED   PARTH LOPIC ACID 200MG DAILY         Nortriptyline HCl (Cap) PAMELOR 25 MG Take 25 mg by mouth every evening.        Omega-3-acid Ethyl Esters (Cap) LOVAZA 1 GM Take 2 capsules twice daily.        Potassium Citrate (Tab CR) UROCIT-K SR 10 MEQ (1080 MG) Take 2 Tabs by mouth 3 times a day.        .                 Medicines prescribed today were sent to:     [facility] [address] [place] [address] - [facility] [facility] & [address] [address]    Phone: [phone] Fax: [phone]    Open 24 Hours?: No      Medication refill instructions:       If your prescription bottle indicates you have medication refills left, it is not necessary to call your provider’s office. Please contact your pharmacy and they will refill your medication.    If your prescription bottle indicates you do not have any refills left, you may request refills at any time through one of the following ways: The online Anergis system (except Urgent Care), by calling your provider’s office, or by asking your pharmacy to contact your provider’s office with a refill request. Medication refills are processed only during regular business hours and may not be available until the next business day. Your provider may request additional information or to have a follow-up visit with you prior to refilling your medication.   *Please Note: Medication refills are assigned a new Rx number when refilled electronically. Your pharmacy may indicate that no refills were authorized even though a new prescription for the same medication is available at the pharmacy. Please request the medicine by name with the pharmacy before contacting your provider for a refill.        Your To Do List     Future Labs/Procedures  Complete By Expires    COMP METABOLIC PANEL  As directed 7/20/2018    LIPID PROFILE  As directed 7/20/2018         [employer] Access Code: [email or web address]  Expires: 8/19/2017  8:07 AM    [employer]  A secure, online tool to manage your health information     [employer]ric [employer]otech® is a secure, online tool that connects you to your personalized health information from the privacy of your home -- day or night - making it very easy for you to manage your healthcare. Once the activation process is completed, you can even access your medical information using the [employer] vito, which is available for free in the Apple Vito store or Google Play store.     [employer] provides the following levels of access (as shown below):   My Chart Features   [facility] Doctor [facility]  Specialists [facility]  Urgent  Care Non-[facility]  Primary Care  Doctor   Email your healthcare team securely and privately 24/7 X X X    Manage appointments: schedule your next appointment; view details of past/upcoming appointments X      Request prescription refills. X      View recent personal medical records, including lab and immunizations X X X X   View health record, including health history, allergies, medications X X X X   Read reports about your outpatient visits, procedures, consult and ER notes X X X X   See your discharge summary, which is a recap of your hospital and/or ER visit that includes your diagnosis, lab results, and care plan. X X       How to register for [employer]:  1. Go to  [email or web address].  2. Click on the Sign Up Now box, which takes you to the New Member Sign Up page. You will need to provide the following information:  a. Enter your [employer] Access Code exactly as it appears at the top of this page. (You will not need to use this code after you’ve completed the sign-up process. If you do not sign up before the expiration date, you must request a new code.)   b. Enter your date of birth.   c. Enter your home email address.    d. Click Submit, and follow the next screen’s instructions.  3. Create a Xolat ID. This will be your Xolat login ID and cannot be changed, so think of one that is secure and easy to remember.  4. Create a Xolat password. You can change your password at any time.  5. Enter your Password Reset Question and Answer. This can be used at a later time if you forget your password.   6. Enter your e-mail address. This allows you to receive e-mail notifications when new information is available in The Hunt.  7. Click Sign Up. You can now view your health information.    For assistance activating your [employer] Hunt account, call [phone]

## 2017-07-20 NOTE — Clinical Note
[facility]   [address]  [place] [address]  Phone: [phone]  Fax: [phone]              Davie Rodriguez  1950    Encounter Date: 7/20/2017    Wesley Sewell M.D.          PROGRESS NOTE:  Subjective:   Davie Rodriguez is a 66 y.o. male who presents today for followup of his hypertension and hyperlipidemia. He also has diabetes, obstructive sleep apnea and mild renal insufficiency.    His blood pressures at home and been fairly labile. His blood pressures run anywhere from approximately 135-145/70-80. Occasionally he'll note a systolic pressure of 150.    He's noted no chest discomfort, dyspnea, palpitations or lightheadedness. He does have some chronic edema.    He's walking about 2 miles 2-3 times a week.    Past Medical History   Diagnosis Date   • Diabetes    • Hypertension    • Renal disorder      STONES   • Hyperlipidemia      Past Surgical History   Procedure Laterality Date   • Other  1995     GALL BLADDER REMOVED   • Recovery  6/21/2010     Performed by SURGERY, IR-RECOVERY at SURGERY SAME DAY [facility] ORS   • Percutaneous nephrostolithotomy  6/23/2010     Performed by FOX GARCIA at SURGERY [facility]     Family History   Problem Relation Age of Onset   • Heart Attack Father 57   • Other Sister 72     diabetes   • Sleep Apnea Sister      History   Smoking status   • Never Smoker    Smokeless tobacco   • Never Used     Allergies   Allergen Reactions   • Penicillins Swelling     Outpatient Encounter Prescriptions as of 7/20/2017   Medication Sig Dispense Refill   • carvedilol (COREG) 25 MG Tab Take 1 Tab by mouth 2 times a day, with meals. 180 Tab 3   • Dulaglutide (TRULICITY SC) Inject  as instructed.     • hydrochlorothiazide (HYDRODIURIL) 12.5 MG tablet Take 1 Tab by mouth every day. 30 Tab 11   • enalapril (VASOTEC) 10 MG Tab Take 1 Tab by mouth 2 times a day. 180 Tab 3   • atorvastatin (LIPITOR) 40  "MG Tab Take 1 Tab by mouth every evening. 90 Tab 3   • omega-3 acid ethyl esters (LOVAZA) 1 GM capsule Take 2 capsules twice daily. 360 Cap 3   • fenofibrate (TRICOR) 145 MG Tab Take 1 Tab by mouth every day. 90 Tab 3   • potassium citrate SR (UROCIT-K SR) 10 MEQ (1080 MG) Tab CR Take 2 Tabs by mouth 3 times a day. 550 Tab 3   • nortriptyline (PAMELOR) 25 MG Cap Take 25 mg by mouth every evening.     • Insulin Glargine (LANTUS SC) Inject  as instructed.     • finasteride (PROSCAR) 5 MG TABS Take 5 mg by mouth every day.     • aspirin 81 MG tablet Take 81 mg by mouth 2 times a day.     • glimepiride (AMARYL) 4 MG TABS Take 4 mg by mouth 2 Times a Day.     • Cholecalciferol (VITAMIN D) 2000 UNIT TABS Take  by mouth every day.     • NON SPECIFIED PARTH LOPIC ACID 200MG DAILY      • Cholecalciferol (VITAMIN D3) 2000 UNIT Cap Take 4,000 Units by mouth every day.       No facility-administered encounter medications on file as of 7/20/2017.     ROS       Objective:   /70 mmHg  Pulse 70  Ht 1.88 m (6' 2\")  Wt 129.275 kg (285 lb)  BMI 36.58 kg/m2  SpO2 91%    Physical Exam   Neck: No JVD present.   Cardiovascular: Normal rate and regular rhythm.  Exam reveals no gallop.    Murmur (2/6 systolic at the base) heard.  Pulmonary/Chest: Effort normal. He has no rales.   Abdominal: Soft. There is no tenderness.   Musculoskeletal: He exhibits edema (trace to 1+ pretibial).     Laboratory from Dr. Houston's office LDL 52.    Laboratory from October 27: BUN 31, creatinine 1.66 and GFR 42. LFTs were within normal limits.    Coronary calcium score:  Coronary calcification from March 2015:  LMA - 12.8  LCX - 31.8  LAD - 140.4  RCA - 67.0  PDA - 0.0  Calcium score:  252.0    Lab Results   Component Value Date/Time    CHOLESTEROL, 07/17/2017 07:00 AM    LDL 47 07/17/2017 07:00 AM    HDL 25* 07/17/2017 07:00 AM    TRIGLYCERIDES 207* 07/17/2017 07:00 AM       Lab Results   Component Value Date/Time    SODIUM 143 07/17/2017 " 07:00 AM    POTASSIUM 4.4 07/17/2017 07:00 AM    CHLORIDE 106 07/17/2017 07:00 AM    CO2 21 07/17/2017 07:00 AM    GLUCOSE 91 07/17/2017 07:00 AM    BUN 32* 07/17/2017 07:00 AM    CREATININE 1.95* 07/17/2017 07:00 AM    BUN-CREATININE RATIO 16 07/17/2017 07:00 AM     Lab Results   Component Value Date/Time    ALKALINE PHOSPHATASE 31* 07/17/2017 07:00 AM    AST(SGOT) 13 07/17/2017 07:00 AM    ALT(SGPT) 15 07/17/2017 07:00 AM    TOTAL BILIRUBIN 0.3 07/17/2017 07:00 AM      No results found for: BNPBTYPENAT       Assessment:     1. Elevated coronary artery calcium score: 250 in March 2015     2. Essential hypertension     3. Dyslipidemia           Medical Decision Making:  Today's Assessment / Status / Plan:     Mr. Rodriguez is clinically stable. His blood pressure appears to be under good control. His lipid status is also under good control on high-dose statin therapy though his triglycerides are mildly elevated and his HDL is low. We discussed increasing his activity level and getting in at least 10,000 steps a day. He is walking a couple times a week and preferably I would like him to walk at least 3 times a week. We also discussed a Mediterranean style diet. He'll follow-up in 6 months with repeat lab work.      CYDNEY Spivey.  [address]  [facility] [address]  VIA Facsimile: [phone]

## 2017-07-20 NOTE — PROGRESS NOTES
Subjective:   Davie Rodriguez is a 66 y.o. male who presents today for followup of his hypertension and hyperlipidemia. He also has diabetes, obstructive sleep apnea and mild renal insufficiency.    His blood pressures at home and been fairly labile. His blood pressures run anywhere from approximately 135-145/70-80. Occasionally he'll note a systolic pressure of 150.    He's noted no chest discomfort, dyspnea, palpitations or lightheadedness. He does have some chronic edema.    He's walking about 2 miles 2-3 times a week.    Past Medical History   Diagnosis Date   • Diabetes    • Hypertension    • Renal disorder      STONES   • Hyperlipidemia      Past Surgical History   Procedure Laterality Date   • Other 1995     GALL BLADDER REMOVED   • Recovery  6/21/2010     Performed by SURGERY, IR-RECOVERY at SURGERY SAME DAY [facility]   • Percutaneous nephrostolithotomy  6/23/2010     Performed by FOX GARCIA at SURGERY [facility]     Family History   Problem Relation Age of Onset   • Heart Attack Father 57   • Other Sister 72     diabetes   • Sleep Apnea Sister      History   Smoking status   • Never Smoker    Smokeless tobacco   • Never Used     Allergies   Allergen Reactions   • Penicillins Swelling     Outpatient Encounter Prescriptions as of 7/20/2017   Medication Sig Dispense Refill   • carvedilol (COREG) 25 MG Tab Take 1 Tab by mouth 2 times a day, with meals. 180 Tab 3   • Dulaglutide (TRULICITY SC) Inject  as instructed.     • hydrochlorothiazide (HYDRODIURIL) 12.5 MG tablet Take 1 Tab by mouth every day. 30 Tab 11   • enalapril (VASOTEC) 10 MG Tab Take 1 Tab by mouth 2 times a day. 180 Tab 3   • atorvastatin (LIPITOR) 40 MG Tab Take 1 Tab by mouth every evening. 90 Tab 3   • omega-3 acid ethyl esters (LOVAZA) 1 GM capsule Take 2 capsules twice daily. 360 Cap 3   • fenofibrate (TRICOR) 145 MG Tab Take 1 Tab by mouth every day. 90 Tab 3   • potassium citrate SR (UROCIT-K SR) 10 MEQ (1080  "MG) Tab CR Take 2 Tabs by mouth 3 times a day. 550 Tab 3   • nortriptyline (PAMELOR) 25 MG Cap Take 25 mg by mouth every evening.     • Insulin Glargine (LANTUS SC) Inject  as instructed.     • finasteride (PROSCAR) 5 MG TABS Take 5 mg by mouth every day.     • aspirin 81 MG tablet Take 81 mg by mouth 2 times a day.     • glimepiride (AMARYL) 4 MG TABS Take 4 mg by mouth 2 Times a Day.     • Cholecalciferol (VITAMIN D) 2000 UNIT TABS Take  by mouth every day.     • NON SPECIFIED PARTH LOPIC ACID 200MG DAILY      • Cholecalciferol (VITAMIN D3) 2000 UNIT Cap Take 4,000 Units by mouth every day.       No facility-administered encounter medications on file as of 7/20/2017.     ROS       Objective:   /70 mmHg  Pulse 70  Ht 1.88 m (6' 2\")  Wt 129.275 kg (285 lb)  BMI 36.58 kg/m2  SpO2 91%    Physical Exam   Neck: No JVD present.   Cardiovascular: Normal rate and regular rhythm.  Exam reveals no gallop.    Murmur (2/6 systolic at the base) heard.  Pulmonary/Chest: Effort normal. He has no rales.   Abdominal: Soft. There is no tenderness.   Musculoskeletal: He exhibits edema (trace to 1+ pretibial).     Laboratory from Dr. Houston's office LDL 52.    Laboratory from October 27: BUN 31, creatinine 1.66 and GFR 42. LFTs were within normal limits.    Coronary calcium score:  Coronary calcification from March 2015:  LMA - 12.8  LCX - 31.8  LAD - 140.4  RCA - 67.0  PDA - 0.0  Calcium score:  252.0    Lab Results   Component Value Date/Time    CHOLESTEROL, 07/17/2017 07:00 AM    LDL 47 07/17/2017 07:00 AM    HDL 25* 07/17/2017 07:00 AM    TRIGLYCERIDES 207* 07/17/2017 07:00 AM       Lab Results   Component Value Date/Time    SODIUM 143 07/17/2017 07:00 AM    POTASSIUM 4.4 07/17/2017 07:00 AM    CHLORIDE 106 07/17/2017 07:00 AM    CO2 21 07/17/2017 07:00 AM    GLUCOSE 91 07/17/2017 07:00 AM    BUN 32* 07/17/2017 07:00 AM    CREATININE 1.95* 07/17/2017 07:00 AM    BUN-CREATININE RATIO 16 07/17/2017 07:00 AM     Lab " Results   Component Value Date/Time    ALKALINE PHOSPHATASE 31* 07/17/2017 07:00 AM    AST(SGOT) 13 07/17/2017 07:00 AM    ALT(SGPT) 15 07/17/2017 07:00 AM    TOTAL BILIRUBIN 0.3 07/17/2017 07:00 AM      No results found for: BNPBTYPENAT       Assessment:     1. Elevated coronary artery calcium score: 250 in March 2015     2. Essential hypertension     3. Dyslipidemia           Medical Decision Making:  Today's Assessment / Status / Plan:     Mr. Rodriguez is clinically stable. His blood pressure appears to be under good control. His lipid status is also under good control on high-dose statin therapy though his triglycerides are mildly elevated and his HDL is low. We discussed increasing his activity level and getting in at least 10,000 steps a day. He is walking a couple times a week and preferably I would like him to walk at least 3 times a week. We also discussed a Mediterranean style diet. He'll follow-up in 6 months with repeat lab work.

## 2017-08-03 ENCOUNTER — APPOINTMENT (OUTPATIENT)
Dept: SLEEP MEDICINE | Facility: MEDICAL CENTER | Age: 67
End: 2017-08-03
Attending: NURSE PRACTITIONER
Payer: MEDICARE

## 2017-08-03 DIAGNOSIS — Z86.69 HISTORY OF OBSTRUCTIVE SLEEP APNEA: ICD-10-CM

## 2017-08-15 ENCOUNTER — APPOINTMENT (OUTPATIENT)
Dept: PULMONOLOGY | Facility: HOSPICE | Age: 67
End: 2017-08-15
Payer: MEDICARE

## 2017-08-31 ENCOUNTER — HOME STUDY (OUTPATIENT)
Dept: SLEEP MEDICINE | Facility: MEDICAL CENTER | Age: 67
End: 2017-08-31
Attending: NURSE PRACTITIONER
Payer: MEDICARE

## 2017-08-31 PROCEDURE — G0399 HOME SLEEP TEST/TYPE 3 PORTA: HCPCS | Performed by: FAMILY MEDICINE

## 2017-09-01 NOTE — PROCEDURES
DIAGNOSTIC HOME SLEEP TEST (HST) REPORT       PATIENT ID:  NAME:  Davie Rodriguez  MRN:               [id number]  YOB: 1950  DATE OF STUDY: 8/31/17  TEST PERFORMED: Diagnostic home sleep test     INDICATION: He has a history of SHAKA, initially diagnosed 15-20 years ago. Symptoms resolved after UPPP. He was never started on CPAP.         TECHNICAL DESCRIPTION:  ResMed Device used was a type-III home study device. Home sleep study recording included: Airflow recording by nasal pressure transducer; Respiratory Effort by abdominal Respiratory Bands; O2 by finger oximetry. A position sensor and a snore channel was also used.    Scoring Criteria: A modification of the the AASM Manual for the Scoring of Sleep and Associated Events, 2012, was used.   Obstructive apnea was scored by cessation of airflow for at least 10 seconds with continuing respiratory effort.  Central apnea was scored by cessation of airflow for at least 10 seconds with no effort.  Hypopnea was scored by a 30% or more reduction in airflow for at least 10 seconds accompanied by an arterial oxygen desaturation of 3% or more.  (For Medicare patients, hypopneas were scored by a 30% or more reduction in airflow for at least 10 seconds accompanied by an arterial oxygen saturation of 4% or more, as required by their insurance, CMS.      Impression:     This study shows evidence of:     1. Sever obstructive sleep apnea with  Respiratory Event Index (ESTEFANIA) of 65.4 per hour . These findings are based on the recording time (flow evaluation time). It is not possible with this device to determine a traditional apnea+hypopnea index (AHI) for total sleep time since EEG channels are not available.     2. Nocturnal event related hypoxemia O2 Sat. heidi was 66% and mean O2 sat was 85% and baseline O2 at 90 %. O2 sat was below 90% for 100% of the flow evaluation time. Oxygen Desaturation (>=4%) Index was elevated at 60/hr.       General sleep  summary: . Total recording time is 8 hours and 15 minutes and total flow evaluation time is 7 hours and 48 minutes. The patient spent 6 hours and 43 minutes in the supine position and 0 hours and 5 minutes in the nonsupine position.    Recommendations:    1. CPAP titration study.   2.   In general patients with sleep apnea are advised to avoid alcohol and sedatives and to not operate a motor vehicle while drowsy. In some cases alternative treatment options may prove effective in resolving sleep apnea in these options include upper airway surgery, the use of a dental orthotic or weight loss and positional therapy. Clinical correlation is required.         Please see the attached report for further details. Thank you for your referral.     Fausto Eduardo MD

## 2017-09-08 ENCOUNTER — SLEEP CENTER VISIT (OUTPATIENT)
Dept: SLEEP MEDICINE | Facility: MEDICAL CENTER | Age: 67
End: 2017-09-08
Payer: MEDICARE

## 2017-09-08 VITALS
DIASTOLIC BLOOD PRESSURE: 60 MMHG | WEIGHT: 285 LBS | BODY MASS INDEX: 36.57 KG/M2 | HEIGHT: 74 IN | OXYGEN SATURATION: 91 % | SYSTOLIC BLOOD PRESSURE: 130 MMHG | HEART RATE: 65 BPM | RESPIRATION RATE: 16 BRPM

## 2017-09-08 DIAGNOSIS — R93.1 ELEVATED CORONARY ARTERY CALCIUM SCORE: ICD-10-CM

## 2017-09-08 DIAGNOSIS — G47.33 OSA (OBSTRUCTIVE SLEEP APNEA): ICD-10-CM

## 2017-09-08 DIAGNOSIS — I10 ESSENTIAL HYPERTENSION: ICD-10-CM

## 2017-09-08 DIAGNOSIS — E11.9 TYPE 2 DIABETES MELLITUS WITHOUT COMPLICATION, UNSPECIFIED LONG TERM INSULIN USE STATUS: ICD-10-CM

## 2017-09-08 DIAGNOSIS — E78.5 DYSLIPIDEMIA: ICD-10-CM

## 2017-09-08 PROCEDURE — 99214 OFFICE O/P EST MOD 30 MIN: CPT | Performed by: INTERNAL MEDICINE

## 2017-09-08 RX ORDER — ZOLPIDEM TARTRATE 5 MG/1
TABLET ORAL
Qty: 3 TAB | Refills: 0 | Status: SHIPPED | OUTPATIENT
Start: 2017-09-08 | End: 2018-03-12

## 2017-09-08 NOTE — PROGRESS NOTES
CC: Home sleep study results    HPI:    66-year-old man returns for results of a recent home sleep study. The patient's home study was done on 8/31/2017 and showed an AHI of 65.4, an apnea index of 50.1, and a hypopnea index of 15.4. He did not experience any central apneas. He had 482 desaturations and his desaturation index was 60.5. His baseline saturation was 90%, the average saturation was 85%, and low saturation was 66%. He spent 100% of the time with saturations less than or equal to 90%. He spent several hours and 26 minutes with saturations less than or equal to 88%. The heart rate varied between 54 bpm and 81 bpm. The patient slept almost entirely in the supine position and snoring was noted throughout the study.    He was originally seen on 7/11:  This patient is a 66 y.o. male, who presents for evaluation and management of sleep-disordered breathing. Medical history includes HTN, HLD, obesity, BMI 36. He was referred by cardiology due to worsening hypertension.        He has a history of SHAKA, initially diagnosed 15-20 years ago. Symptoms resolved after UPPP. He was never started on CPAP. Patient adamantly denies any sleep symptoms. He tells me his wife does not notice snoring. He is denies snoring, resuscitative gasping, daytime fatigue or a.m. headaches. Denies symptoms of narcolepsy, cataplexy or restless leg. He is a fairly regular but schedule. He falls asleep easily, typically wakes a couple times per night. He has a difficult time falling asleep after the second awakening. He is a never smoker. No history of pulmonary disease              Patient Active Problem List    Diagnosis Date Noted   • BMI 36.0-36.9,adult 07/11/2017   • Elevated coronary artery calcium score: 250 in March 2015 11/08/2016   • Essential hypertension 01/07/2016   • CKD (chronic kidney disease) stage 3, GFR 30-59 ml/min 09/17/2015   • Type 2 diabetes mellitus without complication (CMS-[facility]) 09/17/2015   • Dyslipidemia 09/04/2014    • History of obstructive sleep apnea: History of surgery in approximately 2001 09/04/2014       Past Medical History:   Diagnosis Date   • Diabetes    • Hyperlipidemia    • Hypertension    • Renal disorder     STONES        Past Surgical History:   Procedure Laterality Date   • PERCUTANEOUS NEPHROSTOLITHOTOMY  6/23/2010    Performed by FOX GARCIA at SURGERY [facility] ORS   • RECOVERY  6/21/2010    Performed by ANA SCHWARTZ-MIRANDA at SURGERY SAME DAY [facility]S   • OTHER  1995    GALL BLADDER REMOVED       Family History   Problem Relation Age of Onset   • Heart Attack Father 57   • Other Sister 72     diabetes   • Sleep Apnea Sister        Social History     Social History   • Marital status:      Spouse name: N/A   • Number of children: N/A   • Years of education: N/A     Occupational History   • Not on file.     Social History Main Topics   • Smoking status: Never Smoker   • Smokeless tobacco: Never Used   • Alcohol use No   • Drug use: No   • Sexual activity: Not on file     Other Topics Concern   • Not on file     Social History Narrative   • No narrative on file       Current Outpatient Prescriptions   Medication Sig Dispense Refill   • zolpidem (AMBIEN) 5 MG Tab Take 1-2 tablets by mouth every evening as needed for insomnia. Bring to sleep study. 3 Tab 0   • carvedilol (COREG) 25 MG Tab Take 1 Tab by mouth 2 times a day, with meals. 180 Tab 3   • Cholecalciferol (VITAMIN D3) 2000 UNIT Cap Take 4,000 Units by mouth every day.     • Dulaglutide (TRULICITY SC) Inject  as instructed.     • hydrochlorothiazide (HYDRODIURIL) 12.5 MG tablet Take 1 Tab by mouth every day. 30 Tab 11   • enalapril (VASOTEC) 10 MG Tab Take 1 Tab by mouth 2 times a day. 180 Tab 3   • atorvastatin (LIPITOR) 40 MG Tab Take 1 Tab by mouth every evening. 90 Tab 3   • omega-3 acid ethyl esters (LOVAZA) 1 GM capsule Take 2 capsules twice daily. 360 Cap 3   • fenofibrate (TRICOR) 145 MG Tab Take 1 Tab by mouth every day.  "90 Tab 3   • potassium citrate SR (UROCIT-K SR) 10 MEQ (1080 MG) Tab CR Take 2 Tabs by mouth 3 times a day. 550 Tab 3   • nortriptyline (PAMELOR) 25 MG Cap Take 25 mg by mouth every evening.     • Insulin Glargine (LANTUS SC) Inject  as instructed.     • finasteride (PROSCAR) 5 MG TABS Take 5 mg by mouth every day.     • aspirin 81 MG tablet Take 81 mg by mouth 2 times a day.     • glimepiride (AMARYL) 4 MG TABS Take 4 mg by mouth 2 Times a Day.     • Cholecalciferol (VITAMIN D) 2000 UNIT TABS Take  by mouth every day.     • NON SPECIFIED PARTH LOPIC ACID 200MG DAILY        No current facility-administered medications for this visit.     \"CURRENT RX\"    ALLERGIES: Penicillins    ROS  Per history of present illness otherwise negative    PHYSICAL EXAM  MSEW  /60   Pulse 65   Resp 16   Ht 1.88 m (6' 2\")   Wt (!) 129.3 kg (285 lb)   SpO2 91%   BMI 36.59 kg/m²   Appearance: Well-nourished, well-developed, no acute distress  Eyes:  PERRLA, EOMI  Hearing:  Grossly intact  Nose:  Normal, no lesions or deformities, turbinates moist  Oropharynx:  Tongue normal, posterior pharynx without erythema or exudate  Mallampati classification:    Neck: Supple, trachea midline, no masses  Respiratory effort:  No intercostal retractions or use of accessory muscles  Lung auscultation:  No wheezes rhonchi rubs or rales  Cardiac auscultation:  No murmurs, rubs, or gallops, no regular rhythm, normal rate  Abdomen:  No tenderness, no organomegaly  Extremities:  No cyanosis, clubbing, edema  Gait and Station:  Normal  Digits and nails: No clubbing, cyanosis, petechiae, or nodes  Musculoskeletal:  Grossly normal  Skin:  No rashes  Orientation:  Oriented time, place, and person  Mood and affect:  No depression, anxiety, agitation  Judgment:  Intact    PROBLEMS:    1. SHAKA (obstructive sleep apnea)  - zolpidem (AMBIEN) 5 MG Tab; Take 1-2 tablets by mouth every evening as needed for insomnia. Bring to sleep study.  Dispense: 3 Tab; " Refill: 0  - POLYSOMNOGRAPHY TITRATION; Future  2. BMI 36.0-36.9,adult  3. Dyslipidemia  4. Essential hypertension  5. Elevated coronary artery calcium score: 250 in March 2015  6. Type 2 diabetes mellitus without complication, unspecified long term insulin use status (CMS-HCC)        PLAN:   The patient's recent home sleep study showed an apnea hypopnea index of 65.4, a minimum saturation of 66%, and 482 oxygen desaturations. The patient will be scheduled for a positive airway pressure titration then return to clinic for review.    The risks of untreated sleep apnea were discussed with the patient at length. Patients with SHAKA are at increased risk of cardiovascular disease including coronary artery disease, systemic arterial hypertension, pulmonary arterial hypertension, cardiac arrythmias, and stroke. SHAKA patients have an increased risk of motor vehicle accidents, type 2 diabetes, chronic kidney disease, and non-alcoholic liver disease. The patient was advised to avoid driving a motor vehicle when drowsy.    Positive airway pressure, such as CPAP, is considered first-line and preferred therapy for sleep apnea and may reverse both symptoms and risks.

## 2017-10-04 DIAGNOSIS — E78.49 OTHER HYPERLIPIDEMIA: ICD-10-CM

## 2017-10-05 RX ORDER — FENOFIBRATE 145 MG/1
145 TABLET, COATED ORAL
Qty: 90 TAB | Refills: 3 | Status: SHIPPED | OUTPATIENT
Start: 2017-10-05 | End: 2020-02-01

## 2017-12-07 DIAGNOSIS — E78.49 OTHER HYPERLIPIDEMIA: ICD-10-CM

## 2017-12-07 DIAGNOSIS — E78.5 DYSLIPIDEMIA: ICD-10-CM

## 2017-12-07 RX ORDER — ATORVASTATIN CALCIUM 40 MG/1
40 TABLET, FILM COATED ORAL EVERY EVENING
Qty: 90 TAB | Refills: 2 | OUTPATIENT
Start: 2017-12-07 | End: 2018-11-21 | Stop reason: SDUPTHER

## 2017-12-17 ENCOUNTER — SLEEP STUDY (OUTPATIENT)
Dept: SLEEP MEDICINE | Facility: MEDICAL CENTER | Age: 67
End: 2017-12-17
Attending: INTERNAL MEDICINE
Payer: MEDICARE

## 2017-12-17 DIAGNOSIS — G47.33 OSA (OBSTRUCTIVE SLEEP APNEA): ICD-10-CM

## 2017-12-17 PROCEDURE — 95811 POLYSOM 6/>YRS CPAP 4/> PARM: CPT | Performed by: FAMILY MEDICINE

## 2017-12-18 NOTE — PROCEDURES
Comments:  The patient underwent a diagnostic polysomnogram using the standard montage for measurement of parameters of sleep, respiratory events, movement abnormalities, and heart rate and rhythm.    A microphone was used to monitor snoring.  Interpretation:  Study start time was 08:11:57 PM.  Diagnostic recording time was 6h 53.0m with a total sleep time of 2h 53.5m resulting in a sleep efficiency of 42.01%%.    Sleep latency from the start of the study was 43 minutes and the latency from sleep to REM was 145 minutes.  In total,49  arousals were scored for an arousal index of 16.9.  Respiratory:  There were a total of 0 apneas consisting of 0 obstructive apneas, 0 mixed apneas, and 0 central apneas.  A total of 23 hypopneas were scored.  The apnea index was 0.00 per hour and the hypopnea index was 7.95 per hour resulting in an overall AHI of 7.95.  AHI during rem was 2.77 and AHI while supine was 8.47.  Oximetry:  There was a mean oxygen saturation of 91.0% with a minimum oxygen saturation of 86.0%.  Time spent with oxygen saturations below 89% was 1.6 minutes.  Cardiac:  The highest heart rate seen while awake was 99 BPM while the highest heart rate during sleep was 84 BPM with an average sleeping heart rate of 68 BPM.  Limb Movements:  There were a total of 0 PLMs during sleep, of which 0 were PLMS arousals.  This resulted in a PLMS index of 0.0 and a PLMS arousal index of 0.0.    Technical summary: The patient underwent a CPAP titration.  This was a 16 channel montage study to include a 6 channel EEG, a 2 channel EOG, and chin EMG, left and right leg EMG, a snore channel, and a CFLOW pressure transducer.   Respiratory effort was assessed with the use of a thoracic and abdominal monitor and overnight oximetry was obtained. Audio and video recordings were reviewed. This was a fully attended study and sleep stage scoring was performed. The test was technically adequate.    General sleep summary:  During the  overnight study, the sleep latency was 6 min which is decreased. The REM latency was 118 min which is increased. The total sleep time was 374 min and sleep efficiency was 86 which is decreased.  Sleep stage proportions showed no N3 sleep.  In regards to sleep quality there was a mild degree of sleep fragmentation as shown by the arousal index of 9.5 an hour which is increased. The arousals were due to  Respiratory events.    CPAP Titration:  The PAP titration was initiated with CPAP 5 cm of water and the pressure which was slowly titrated up in an attempt to eliminate sleep disordered breathing and snoring. The best tolerated pressure tested during the study was CPAP 16 cm water and at this final pressure the patient was observed in the supine position  and in the REM sleep stage. The apnea hypopnea index improved to 6.2 per hour and O2 heidi 87%. The average O2 stauration was 92%. Snoring was resolved. There were no significant periodic limb movements.  The patient demonstrated NSR and an average heart rate of 60 beats per minute.  There was no ventricular ectopy or tachyarrhythmias. The patient utilized large simplus mask with heated humidification. The CPAP was well-tolerated and there were minimal air leaks. No supplemental oxygen was required.    Impression:  1.  SHAKA  2.  Successful titration      Recommendations:  I recommend CPAP 16 cm with large simplus mask. Recommended a data card that can measure leak, apnea hypopnea index and compliance for further outpatient monitoring and management of CPAP therapy. In some cases alternative treatment options may prove effective in resolving sleep apnea and these options include upper airway surgery, the use of a dental orthotic or weight loss and positional therapy. Clinical correlation is required. In general patients with sleep apnea are advised to avoid alcohol and sedatives and to not operate a motor vehicle while drowsy and are at a greater risk for  cardiovascular disease.

## 2017-12-26 ENCOUNTER — SLEEP CENTER VISIT (OUTPATIENT)
Dept: SLEEP MEDICINE | Facility: MEDICAL CENTER | Age: 67
End: 2017-12-26
Payer: MEDICARE

## 2017-12-26 VITALS
OXYGEN SATURATION: 90 % | DIASTOLIC BLOOD PRESSURE: 70 MMHG | RESPIRATION RATE: 16 BRPM | SYSTOLIC BLOOD PRESSURE: 130 MMHG | HEIGHT: 74 IN | HEART RATE: 72 BPM | BODY MASS INDEX: 38.24 KG/M2 | WEIGHT: 298 LBS

## 2017-12-26 DIAGNOSIS — G47.33 OSA (OBSTRUCTIVE SLEEP APNEA): ICD-10-CM

## 2017-12-26 PROCEDURE — 99214 OFFICE O/P EST MOD 30 MIN: CPT | Performed by: NURSE PRACTITIONER

## 2017-12-26 NOTE — PATIENT INSTRUCTIONS
Sleep Apnea   Sleep apnea is a sleep disorder characterized by abnormal pauses in breathing while you sleep. When your breathing pauses, the level of oxygen in your blood decreases. This causes you to move out of deep sleep and into light sleep. As a result, your quality of sleep is poor, and the system that carries your blood throughout your body (cardiovascular system) experiences stress. If sleep apnea remains untreated, the following conditions can develop:  · High blood pressure (hypertension).  · Coronary artery disease.  · Inability to achieve or maintain an erection (impotence).  · Impairment of your thought process (cognitive dysfunction).  There are three types of sleep apnea:  1. Obstructive sleep apnea--Pauses in breathing during sleep because of a blocked airway.  2. Central sleep apnea--Pauses in breathing during sleep because the area of the brain that controls your breathing does not send the correct signals to the muscles that control breathing.  3. Mixed sleep apnea--A combination of both obstructive and central sleep apnea.  RISK FACTORS  The following risk factors can increase your risk of developing sleep apnea:  · Being overweight.  · Smoking.  · Having narrow passages in your nose and throat.  · Being of older age.  · Being male.  · Alcohol use.  · Sedative and tranquilizer use.  · Ethnicity. Among individuals younger than 35 years,  Americans are at increased risk of sleep apnea.  SYMPTOMS   · Difficulty staying asleep.  · Daytime sleepiness and fatigue.  · Loss of energy.  · Irritability.  · Loud, heavy snoring.  · Morning headaches.  · Trouble concentrating.  · Forgetfulness.  · Decreased interest in sex.  · Unexplained sleepiness.  DIAGNOSIS   In order to diagnose sleep apnea, your caregiver will perform a physical examination. A sleep study done in the comfort of your own home may be appropriate if you are otherwise healthy. Your caregiver may also recommend that you spend the  "night in a sleep lab. In the sleep lab, several monitors record information about your heart, lungs, and brain while you sleep. Your leg and arm movements and blood oxygen level are also recorded.  TREATMENT  The following actions may help to resolve mild sleep apnea:  · Sleeping on your side.    · Using a decongestant if you have nasal congestion.    · Avoiding the use of depressants, including alcohol, sedatives, and narcotics.    · Losing weight and modifying your diet if you are overweight.  There also are devices and treatments to help open your airway:  · Oral appliances. These are custom-made mouthpieces that shift your lower jaw forward and slightly open your bite. This opens your airway.  · Devices that create positive airway pressure. This positive pressure \"splints\" your airway open to help you breathe better during sleep. The following devices create positive airway pressure:  ¨ Continuous positive airway pressure (CPAP) device. The CPAP device creates a continuous level of air pressure with an air pump. The air is delivered to your airway through a mask while you sleep. This continuous pressure keeps your airway open.  ¨ Nasal expiratory positive airway pressure (EPAP) device. The EPAP device creates positive air pressure as you exhale. The device consists of single-use valves, which are inserted into each nostril and held in place by adhesive. The valves create very little resistance when you inhale but create much more resistance when you exhale. That increased resistance creates the positive airway pressure. This positive pressure while you exhale keeps your airway open, making it easier to breath when you inhale again.  ¨ Bilevel positive airway pressure (BPAP) device. The BPAP device is used mainly in patients with central sleep apnea. This device is similar to the CPAP device because it also uses an air pump to deliver continuous air pressure through a mask. However, with the BPAP machine, the " pressure is set at two different levels. The pressure when you exhale is lower than the pressure when you inhale.  · Surgery. Typically, surgery is only done if you cannot comply with less invasive treatments or if the less invasive treatments do not improve your condition. Surgery involves removing excess tissue in your airway to create a wider passage way.     This information is not intended to replace advice given to you by your health care provider. Make sure you discuss any questions you have with your health care provider.     Document Released: 12/08/2003 Document Revised: 01/08/2016 Document Reviewed: 04/25/2013  [employer] Patient Education ©2016 [employer]

## 2017-12-26 NOTE — PROGRESS NOTES
Chief Complaint   Patient presents with   • Apnea     Sleep Study Results       HPI:  Davie Rodriguez is a 67 y.o. year old male here today for follow-up on his dedicated in lab titration study. He was last seen in the office 9/8/2017 with Dr. Andreas Amaral after a home sleep study 8/31/2017 showed an AHI of 65.4, an apnea index of 50.1, and a hypopnea index of 15.4. He did not experience any central apneas. He had 482 desaturations and his desaturation index was 60.5. His baseline saturation was 90%, the average saturation was 85%, and low saturation was 66%. He spent 100% of the time with saturations less than or equal to 90%. He spent several hours and 26 minutes with saturations less than or equal to 88%. The heart rate varied between 54 bpm and 81 bpm. The patient slept almost entirely in the supine position and snoring was noted throughout the study. He was initially diagnosed with sleep apnea 15-20 years ago. Symptoms resolved after UPPP. His wife noted a recurrence of snoring. He denies significant daytime fatigue or  Morning headaches. He was recommended an in lab dedicated titration study which was performed 12/17/2017 and indicates successful titration to a CPAP pressure of 16 CM H20 with a resultant AHI of 6.2 and a mean 02 saturation of 92%.      Past Medical History:   Diagnosis Date   • Diabetes    • Hyperlipidemia    • Hypertension    • Renal disorder     STONES       Past Surgical History:   Procedure Laterality Date   • PERCUTANEOUS NEPHROSTOLITHOTOMY  6/23/2010    Performed by FOX GARCIA at SURGERY [facility] ORS   • RECOVERY  6/21/2010    Performed by KENYATTA SCHWARTZ at SURGERY SAME DAY [facility] ORS   • OTHER  1995    GALL BLADDER REMOVED       Family History   Problem Relation Age of Onset   • Heart Attack Father 57   • Other Sister 72     diabetes   • Sleep Apnea Sister        Social History     Social History   • Marital status:      Spouse name: N/A   • Number  of children: N/A   • Years of education: N/A     Occupational History   • Not on file.     Social History Main Topics   • Smoking status: Never Smoker   • Smokeless tobacco: Never Used   • Alcohol use No   • Drug use: No   • Sexual activity: Not on file     Other Topics Concern   • Not on file     Social History Narrative   • No narrative on file         ROS:  Constitutional: Denies fevers, chills, sweats, weight loss  Eyes: Denies glasses, vision loss, pain, drainage, double vision  Ears/Nose/Mouth/Throat: Denies rhinitis, nasal congestion, ear ache, difficulty hearing, sore throat, persistent hoarseness, decayed teeth/toothache  Cardiovascular: Denies chest pain, tightness, palpitations, swelling in feet/legs, fainting, difficulty breathing when laying down  Respiratory: Denies shortness of breath, cough, sputum, wheezing, painful breathing, coughing up blood  GI: Denies heartburn, difficulty swallowing, nausea, vomiting, abdominal pain, diarrhea, constipation  : Denies frequent urination, painful urination  Integumentary: Denies rashes, lumps or color changes  MSK: Denies painful joints, sore muscles, and back pain.   Neurological: Denies frequent headaches, dizziness, weakness  Sleep: See HPI       Current Outpatient Prescriptions   Medication Sig Dispense Refill   • atorvastatin (LIPITOR) 40 MG Tab Take 1 Tab by mouth every evening. 90 Tab 2   • fenofibrate (TRICOR) 145 MG Tab Take 1 Tab by mouth every day. 90 Tab 3   • carvedilol (COREG) 25 MG Tab Take 1 Tab by mouth 2 times a day, with meals. 180 Tab 3   • Cholecalciferol (VITAMIN D3) 2000 UNIT Cap Take 4,000 Units by mouth every day.     • Dulaglutide (TRULICITY SC) Inject  as instructed.     • hydrochlorothiazide (HYDRODIURIL) 12.5 MG tablet Take 1 Tab by mouth every day. 30 Tab 11   • omega-3 acid ethyl esters (LOVAZA) 1 GM capsule Take 2 capsules twice daily. 360 Cap 3   • potassium citrate SR (UROCIT-K SR) 10 MEQ (1080 MG) Tab CR Take 2 Tabs by mouth 3  "times a day. 550 Tab 3   • nortriptyline (PAMELOR) 25 MG Cap Take 25 mg by mouth every evening.     • Insulin Glargine (LANTUS SC) Inject  as instructed.     • finasteride (PROSCAR) 5 MG TABS Take 5 mg by mouth every day.     • aspirin 81 MG tablet Take 81 mg by mouth 2 times a day.     • glimepiride (AMARYL) 4 MG TABS Take 4 mg by mouth 2 Times a Day.     • Cholecalciferol (VITAMIN D) 2000 UNIT TABS Take  by mouth every day.     • NON SPECIFIED PARTH LOPIC ACID 200MG DAILY      • zolpidem (AMBIEN) 5 MG Tab Take 1-2 tablets by mouth every evening as needed for insomnia. Bring to sleep study. 3 Tab 0   • enalapril (VASOTEC) 10 MG Tab Take 1 Tab by mouth 2 times a day. 180 Tab 3     No current facility-administered medications for this visit.        Allergies   Allergen Reactions   • Penicillins Swelling       Blood pressure 130/70, pulse 72, resp. rate 16, height 1.88 m (6' 2\"), weight (!) 135.2 kg (298 lb), SpO2 90 %.    PE:   Appearance: Well developed, well nourished, no acute distress  Eyes: PERRL, EOM intact, sclera white, conjunctiva moist  Ears: no lesions or deformities  Hearing: grossly intact  Nose: no lesions or deformities  Oropharynx: tongue normal, posterior pharynx without erythema or exudate  Mallampati Classification: Class 4  Neck: supple, trachea midline, no masses   Respiratory effort: no intercostal retractions or use of accessory muscles  Lung auscultation: no rales, rhonchi or wheezes  Heart auscultation: no murmur rub or gallop  Extremities: no cyanosis or edema  Abdomen: soft ,non tender, no masses  Gait and Station: normal  Digits and nails: no clubbing, cyanosis, petechiae or nodes.  Cranial nerves: grossly intact  Skin: no rashes, lesions or ulcers noted  Orientation: Oriented to time, person and place  Mood and affect: mood and affect appropriate, normal interaction with examiner  Judgement: Intact          Assessment:  1. SHAKA (obstructive sleep apnea)  DME CPAP   2. BMI 38.0-38.9,adult   "         Plan:      1) Reviewed home sleep study and recent titration study in detail. Discussed pathophysiology of sleep apnea and various treatment options in detail. he is amendable to a trial of airway pressurization. Order for Auto CPAP at 10-16 CM H20. Handout provided on sleep apnea.   2) Sleep hygiene discussed.   3) Weight loss recommended.   4) Follow up in 6 weeks with compliance card download, sooner if needed.

## 2018-01-23 ENCOUNTER — TELEPHONE (OUTPATIENT)
Dept: PULMONOLOGY | Facility: HOSPICE | Age: 68
End: 2018-01-23

## 2018-01-23 NOTE — TELEPHONE ENCOUNTER
The patient called and stated that he needs his notes sent to [facility] for his CPAP order as they never got them.  I refaxed the order with the notes to [facility].  TB

## 2018-01-24 LAB
ALBUMIN SERPL-MCNC: 4 G/DL (ref 3.6–4.8)
ALBUMIN/GLOB SERPL: 2 {RATIO} (ref 1.2–2.2)
ALP SERPL-CCNC: 32 IU/L (ref 39–117)
ALT SERPL-CCNC: 17 IU/L (ref 0–44)
AST SERPL-CCNC: 9 IU/L (ref 0–40)
BILIRUB SERPL-MCNC: 0.3 MG/DL (ref 0–1.2)
BUN SERPL-MCNC: 32 MG/DL (ref 8–27)
BUN/CREAT SERPL: 17 (ref 10–24)
CALCIUM SERPL-MCNC: 9.7 MG/DL (ref 8.6–10.2)
CHLORIDE SERPL-SCNC: 107 MMOL/L (ref 96–106)
CO2 SERPL-SCNC: 24 MMOL/L (ref 18–29)
CREAT SERPL-MCNC: 1.86 MG/DL (ref 0.76–1.27)
GLOBULIN SER CALC-MCNC: 2 G/DL (ref 1.5–4.5)
GLUCOSE SERPL-MCNC: 77 MG/DL (ref 65–99)
IF AFRICAN AMERICAN  100797: 42 ML/MIN/1.73
IF NON AFRICAN AMER 100791: 36 ML/MIN/1.73
POTASSIUM SERPL-SCNC: 4.3 MMOL/L (ref 3.5–5.2)
PROT SERPL-MCNC: 6 G/DL (ref 6–8.5)
SODIUM SERPL-SCNC: 139 MMOL/L (ref 134–144)

## 2018-01-25 LAB
CHOLEST SERPL-MCNC: 125 MG/DL (ref 100–199)
COMMENT 011824: ABNORMAL
HDLC SERPL-MCNC: 26 MG/DL
LDLC SERPL CALC-MCNC: 58 MG/DL (ref 0–99)
TRIGL SERPL-MCNC: 203 MG/DL (ref 0–149)
VLDLC SERPL CALC-MCNC: 41 MG/DL (ref 5–40)

## 2018-01-29 ENCOUNTER — OFFICE VISIT (OUTPATIENT)
Dept: CARDIOLOGY | Facility: MEDICAL CENTER | Age: 68
End: 2018-01-29
Payer: MEDICARE

## 2018-01-29 VITALS
HEART RATE: 74 BPM | HEIGHT: 74 IN | WEIGHT: 300 LBS | SYSTOLIC BLOOD PRESSURE: 126 MMHG | DIASTOLIC BLOOD PRESSURE: 76 MMHG | BODY MASS INDEX: 38.5 KG/M2

## 2018-01-29 DIAGNOSIS — R93.1 ELEVATED CORONARY ARTERY CALCIUM SCORE: ICD-10-CM

## 2018-01-29 DIAGNOSIS — I10 ESSENTIAL HYPERTENSION: ICD-10-CM

## 2018-01-29 DIAGNOSIS — E78.5 DYSLIPIDEMIA: ICD-10-CM

## 2018-01-29 PROCEDURE — 99214 OFFICE O/P EST MOD 30 MIN: CPT | Performed by: INTERNAL MEDICINE

## 2018-01-29 NOTE — PROGRESS NOTES
Subjective:   Davie Rodriguez is a 67 y.o. male who presents today for followup of his hypertension and hyperlipidemia. He also has diabetes, obstructive sleep apnea and mild renal insufficiency.    His blood pressures at home been running about 120/70.    He did see pulmonary and is now on CPAP. However, he started this less than a week ago.    He does note occasional dependent edema. He's had no chest discomfort, dyspnea, palpitations or lightheadedness.    He's walking about 2 miles 2-3 times a week.    Past Medical History:   Diagnosis Date   • Diabetes    • Hyperlipidemia    • Hypertension    • Renal disorder     STONES     Past Surgical History:   Procedure Laterality Date   • OTHER 1995    GALL BLADDER REMOVED   • PERCUTANEOUS NEPHROSTOLITHOTOMY  6/23/2010    Performed by FOX GARCIA [facility]   • RECOVERY  6/21/2010    Performed by LANA, ANA-MIRANDA at SURGERY SAME DAY [facility]     Family History   Problem Relation Age of Onset   • Heart Attack Father 57   • Other Sister 72     diabetes   • Sleep Apnea Sister      History   Smoking Status   • Never Smoker   Smokeless Tobacco   • Never Used     Allergies   Allergen Reactions   • Penicillins Swelling     Outpatient Encounter Prescriptions as of 1/29/2018   Medication Sig Dispense Refill   • atorvastatin (LIPITOR) 40 MG Tab Take 1 Tab by mouth every evening. 90 Tab 2   • fenofibrate (TRICOR) 145 MG Tab Take 1 Tab by mouth every day. 90 Tab 3   • zolpidem (AMBIEN) 5 MG Tab Take 1-2 tablets by mouth every evening as needed for insomnia. Bring to sleep study. 3 Tab 0   • carvedilol (COREG) 25 MG Tab Take 1 Tab by mouth 2 times a day, with meals. 180 Tab 3   • Cholecalciferol (VITAMIN D3) 2000 UNIT Cap Take 4,000 Units by mouth every day.     • Dulaglutide (TRULICITY SC) Inject  as instructed.     • hydrochlorothiazide (HYDRODIURIL) 12.5 MG tablet Take 1 Tab by mouth every day. 30 Tab 11   • enalapril (VASOTEC) 10 MG Tab  "Take 1 Tab by mouth 2 times a day. 180 Tab 3   • omega-3 acid ethyl esters (LOVAZA) 1 GM capsule Take 2 capsules twice daily. 360 Cap 3   • potassium citrate SR (UROCIT-K SR) 10 MEQ (1080 MG) Tab CR Take 2 Tabs by mouth 3 times a day. 550 Tab 3   • nortriptyline (PAMELOR) 25 MG Cap Take 25 mg by mouth every evening.     • Insulin Glargine (LANTUS SC) Inject  as instructed.     • finasteride (PROSCAR) 5 MG TABS Take 5 mg by mouth every day.     • aspirin 81 MG tablet Take 81 mg by mouth 2 times a day.     • glimepiride (AMARYL) 4 MG TABS Take 4 mg by mouth 2 Times a Day.     • Cholecalciferol (VITAMIN D) 2000 UNIT TABS Take  by mouth every day.     • NON SPECIFIED PARTH LOPIC ACID 200MG DAILY        No facility-administered encounter medications on file as of 1/29/2018.      ROS       Objective:   /76   Pulse 74   Ht 1.88 m (6' 2\")   Wt (!) 136.1 kg (300 lb)   BMI 38.52 kg/m²     Physical Exam   Neck: No JVD present.   Cardiovascular: Normal rate and regular rhythm.  Exam reveals no gallop.    Murmur (2/6 systolic at the base) heard.  Pulmonary/Chest: Effort normal. He has no rales.   Abdominal: Soft. There is no tenderness.   Musculoskeletal: He exhibits edema (trace to 1+ pretibial).     Laboratory from Dr. Houston's office LDL 52.    Laboratory from October 27: BUN 31, creatinine 1.66 and GFR 42. LFTs were within normal limits.    Coronary calcium score:  Coronary calcification from March 2015:  LMA - 12.8  LCX - 31.8  LAD - 140.4  RCA - 67.0  PDA - 0.0  Calcium score:  252.0    Lab Results   Component Value Date/Time    CHOLSTRLTOT 125 01/24/2018 09:11 AM    LDL 58 01/24/2018 09:11 AM    HDL 26 (L) 01/24/2018 09:11 AM    TRIGLYCERIDE 203 (H) 01/24/2018 09:11 AM       Lab Results   Component Value Date/Time    SODIUM 139 01/24/2018 09:08 AM    SODIUM 137 08/15/2013 04:37 PM    POTASSIUM 4.3 01/24/2018 09:08 AM    POTASSIUM 4.1 08/15/2013 04:37 PM    CHLORIDE 107 (H) 01/24/2018 09:08 AM    CHLORIDE 106 " 08/15/2013 04:37 PM    CO2 24 01/24/2018 09:08 AM    CO2 24 08/15/2013 04:37 PM    GLUCOSE 77 01/24/2018 09:08 AM    GLUCOSE 236 (H) 08/15/2013 04:37 PM    BUN 32 (H) 01/24/2018 09:08 AM    BUN 18 08/15/2013 04:37 PM    CREATININE 1.86 (H) 01/24/2018 09:08 AM    CREATININE 1.35 08/15/2013 04:37 PM    BUNCREATRAT 17 01/24/2018 09:08 AM     Lab Results   Component Value Date/Time    ALKPHOSPHAT 32 (L) 01/24/2018 09:08 AM    ALKPHOSPHAT 55 08/15/2013 04:37 PM    ASTSGOT 9 01/24/2018 09:08 AM    ASTSGOT 14 08/15/2013 04:37 PM    ALTSGPT 17 01/24/2018 09:08 AM    ALTSGPT 25 08/15/2013 04:37 PM    TBILIRUBIN 0.3 01/24/2018 09:08 AM    TBILIRUBIN 0.9 08/15/2013 04:37 PM      No results found for: BNPBTYPENAT       Assessment:     1. Elevated coronary artery calcium score: 250 in March 2015     2. Essential hypertension     3. Dyslipidemia           Medical Decision Making:  Today's Assessment / Status / Plan:     Mr. Rodriguez is clinically stable. He has had a decline in his kidney function and is scheduled to see nephrology. At this time, we will discontinue HCTZ. His lipid status is been under good control. He will follow-up in about 6 months. He is asked to call if he has a significant elevation in his blood pressure. We again discussed getting at least 10,000 steps daily.

## 2018-02-12 DIAGNOSIS — I10 ESSENTIAL HYPERTENSION: ICD-10-CM

## 2018-02-12 DIAGNOSIS — E78.5 HYPERLIPIDEMIA, UNSPECIFIED HYPERLIPIDEMIA TYPE: ICD-10-CM

## 2018-02-13 RX ORDER — OMEGA-3-ACID ETHYL ESTERS 1 G/1
CAPSULE, LIQUID FILLED ORAL
Qty: 360 CAP | Refills: 3 | Status: SHIPPED | OUTPATIENT
Start: 2018-02-13 | End: 2019-08-13 | Stop reason: SDUPTHER

## 2018-02-13 RX ORDER — ENALAPRIL MALEATE 10 MG/1
10 TABLET ORAL 2 TIMES DAILY
Qty: 180 TAB | Refills: 3 | Status: SHIPPED | OUTPATIENT
Start: 2018-02-13 | End: 2018-04-27

## 2018-03-12 ENCOUNTER — SLEEP CENTER VISIT (OUTPATIENT)
Dept: SLEEP MEDICINE | Facility: MEDICAL CENTER | Age: 68
End: 2018-03-12
Payer: MEDICARE

## 2018-03-12 VITALS
RESPIRATION RATE: 17 BRPM | DIASTOLIC BLOOD PRESSURE: 62 MMHG | WEIGHT: 307 LBS | HEIGHT: 74 IN | SYSTOLIC BLOOD PRESSURE: 126 MMHG | OXYGEN SATURATION: 96 % | BODY MASS INDEX: 39.4 KG/M2 | HEART RATE: 60 BPM

## 2018-03-12 DIAGNOSIS — G47.33 OSA (OBSTRUCTIVE SLEEP APNEA): ICD-10-CM

## 2018-03-12 PROCEDURE — 99213 OFFICE O/P EST LOW 20 MIN: CPT | Performed by: NURSE PRACTITIONER

## 2018-03-12 NOTE — PROGRESS NOTES
Chief Complaint   Patient presents with   • Apnea       HPI:  Davie Rodriguez is a 67 y.o. year old male here today for follow-up on his obstructive sleep apnea. He had a home sleep study 8/31/2017 which showed an AHI of 65.4, an apnea index of 50.1, and a hypopnea index of 15.4. He did not experience any central apneas. He had 482 desaturations and his desaturation index was 60.5. His baseline saturation was 90%, the average saturation was 85%, and low saturation was 66%. He spent 100% of the time with saturations less than or equal to 90%. He spent several hours and 26 minutes with saturations less than or equal to 88%. The heart rate varied between 54 bpm and 81 bpm. The patient slept almost entirely in the supine position and snoring was noted throughout the study. He was initially diagnosed with sleep apnea 15-20 years ago. Symptoms resolved after UPPP. His wife noted a recurrence of snoring. He denies significant daytime fatigue or Morning headaches. He was recommended an in lab dedicated titration study which was performed 12/17/2017 and indicates successful titration to a CPAP pressure of 16 CM H20 with a resultant AHI of 6.2 and a mean 02 saturation of 92%. He was started on Auto CPAP 10-16 CM H20 at his last office visit. His compliance card download indicates an AHI of 16.8 with a peak average pressure of 14.3 and an average use of over 5 hours at night. Download suggest 27 minutes of mask leaking. He tolerates the pressure well. He notes occasional mask leaks, but feels it is time to change his inner liner. He is unsure of whether or not he is sleeping better. However he is waking more refreshed. He is still occasionally tired during the day. He is working on walking more routinely. He denies any morning headaches.          Past Medical History:   Diagnosis Date   • Diabetes    • Hyperlipidemia    • Hypertension    • Renal disorder     STONES       Past Surgical History:   Procedure Laterality  Date   • PERCUTANEOUS NEPHROSTOLITHOTOMY  6/23/2010    Performed by FOX GARCIA at SURGERY [facility] ORS   • RECOVERY  6/21/2010    Performed by LANA, ANA-RECOVERY at SURGERY SAME DAY [facility] ORS   • OTHER  1995    GALL BLADDER REMOVED       Family History   Problem Relation Age of Onset   • Heart Attack Father 57   • Other Sister 72     diabetes   • Sleep Apnea Sister        Social History     Social History   • Marital status:      Spouse name: N/A   • Number of children: N/A   • Years of education: N/A     Occupational History   • Not on file.     Social History Main Topics   • Smoking status: Never Smoker   • Smokeless tobacco: Never Used   • Alcohol use No   • Drug use: No   • Sexual activity: Not on file     Other Topics Concern   • Not on file     Social History Narrative   • No narrative on file         ROS:  Constitutional: Denies fevers, chills, sweats, weight loss  Eyes: Denies glasses, vision loss, pain, drainage, double vision  Ears/Nose/Mouth/Throat: Denies rhinitis, nasal congestion, ear ache, difficulty hearing, sore throat, persistent hoarseness, decayed teeth/toothache  Cardiovascular: Denies chest pain, tightness, palpitations, swelling in feet/legs, fainting, difficulty breathing when laying down  Respiratory: Denies shortness of breath, cough, sputum, wheezing, painful breathing, coughing up blood  GI: Denies heartburn, difficulty swallowing, nausea, vomiting, abdominal pain, diarrhea, constipation  : Denies frequent urination, painful urination  Integumentary: Denies rashes, lumps or color changes  MSK: Denies painful joints, sore muscles, and back pain.   Neurological: Denies frequent headaches, dizziness, weakness  Sleep: See HPI       Current Outpatient Prescriptions   Medication Sig Dispense Refill   • omega-3 acid ethyl esters (LOVAZA) 1 GM capsule Take 2 capsules twice daily. 360 Cap 3   • enalapril (VASOTEC) 10 MG Tab Take 1 Tab by mouth 2 times a day. 180 Tab 3  "  • atorvastatin (LIPITOR) 40 MG Tab Take 1 Tab by mouth every evening. 90 Tab 2   • fenofibrate (TRICOR) 145 MG Tab Take 1 Tab by mouth every day. 90 Tab 3   • carvedilol (COREG) 25 MG Tab Take 1 Tab by mouth 2 times a day, with meals. 180 Tab 3   • Dulaglutide (TRULICITY SC) Inject  as instructed.     • potassium citrate SR (UROCIT-K SR) 10 MEQ (1080 MG) Tab CR Take 2 Tabs by mouth 3 times a day. 550 Tab 3   • Insulin Glargine (LANTUS SC) Inject  as instructed.     • finasteride (PROSCAR) 5 MG TABS Take 5 mg by mouth every day.     • aspirin 81 MG tablet Take 81 mg by mouth 2 times a day.     • glimepiride (AMARYL) 4 MG TABS Take 4 mg by mouth 2 Times a Day.     • Cholecalciferol (VITAMIN D) 2000 UNIT TABS Take  by mouth every day.     • NON SPECIFIED PARTH LOPIC ACID 200MG DAILY        No current facility-administered medications for this visit.        Allergies   Allergen Reactions   • Penicillins Swelling       Blood pressure 126/62, pulse 60, resp. rate 17, height 1.88 m (6' 2\"), weight (!) 139.3 kg (307 lb), SpO2 96 %.    PE:   Appearance: Well developed, well nourished, no acute distress  Eyes: PERRL, EOM intact, sclera white, conjunctiva moist  Ears: no lesions or deformities  Hearing: grossly intact  Nose: no lesions or deformities  Oropharynx: tongue normal, posterior pharynx without erythema or exudate  Mallampati Classification: class 4  Neck: supple, trachea midline, no masses   Respiratory effort: no intercostal retractions or use of accessory muscles  Lung auscultation: no rales, rhonchi or wheezes  Heart auscultation: no murmur rub or gallop  Extremities: no cyanosis or edema  Abdomen: soft ,non tender, no masses  Gait and Station: normal  Digits and nails: no clubbing, cyanosis, petechiae or nodes.  Cranial nerves: grossly intact  Skin: no rashes, lesions or ulcers noted  Orientation: Oriented to time, person and place  Mood and affect: mood and affect appropriate, normal interaction with " examiner  Judgement: Intact          Assessment:  1. SHAKA (obstructive sleep apnea)     2. BMI 39.0-39.9,adult  Patient identified as having weight management issue.  Appropriate orders and counseling given.         Plan:    1) Increase Auto CPAP 12-16 CM H20. Replace inner liner on mask. If leaks persist would recommend mask fitting. Encouraged increased time spent on therapy.   2) Sleep hygiene discussed. Recommend keeping a set sleep/wake schedule. Logging enough hours of sleep. Limiting/Avoiding naps. No caffeine after noon and no heavy meals in the evening. Recommend daily exercise.   3) Weight loss recommended.  4) Follow up in 2 months with repeat compliance card download, sooner if needed.

## 2018-03-12 NOTE — PATIENT INSTRUCTIONS
Plan:    1) Increase Auto CPAP 12-16 CM H20. Replace inner liner on mask. If leaks persist would recommend mask fitting. Encouraged increased time spent on therapy.   2) Sleep hygiene discussed. Recommend keeping a set sleep/wake schedule. Logging enough hours of sleep. Limiting/Avoiding naps. No caffeine after noon and no heavy meals in the evening. Recommend daily exercise.   3) Weight loss recommended.  4) Follow up in 2 months with repeat compliance card download, sooner if needed.

## 2018-04-27 ENCOUNTER — TELEPHONE (OUTPATIENT)
Dept: CARDIOLOGY | Facility: MEDICAL CENTER | Age: 68
End: 2018-04-27

## 2018-04-27 ENCOUNTER — OFFICE VISIT (OUTPATIENT)
Dept: CARDIOLOGY | Facility: MEDICAL CENTER | Age: 68
End: 2018-04-27
Payer: MEDICARE

## 2018-04-27 VITALS
WEIGHT: 309 LBS | SYSTOLIC BLOOD PRESSURE: 130 MMHG | BODY MASS INDEX: 39.66 KG/M2 | HEIGHT: 74 IN | DIASTOLIC BLOOD PRESSURE: 62 MMHG | OXYGEN SATURATION: 91 % | HEART RATE: 56 BPM

## 2018-04-27 DIAGNOSIS — Z86.69 HISTORY OF OBSTRUCTIVE SLEEP APNEA: ICD-10-CM

## 2018-04-27 DIAGNOSIS — R93.1 ELEVATED CORONARY ARTERY CALCIUM SCORE: ICD-10-CM

## 2018-04-27 DIAGNOSIS — N18.30 CKD (CHRONIC KIDNEY DISEASE) STAGE 3, GFR 30-59 ML/MIN (HCC): ICD-10-CM

## 2018-04-27 DIAGNOSIS — R60.9 SWELLING: ICD-10-CM

## 2018-04-27 DIAGNOSIS — E78.5 DYSLIPIDEMIA: ICD-10-CM

## 2018-04-27 DIAGNOSIS — G47.33 OSA (OBSTRUCTIVE SLEEP APNEA): ICD-10-CM

## 2018-04-27 DIAGNOSIS — I10 ESSENTIAL HYPERTENSION: ICD-10-CM

## 2018-04-27 PROCEDURE — 99214 OFFICE O/P EST MOD 30 MIN: CPT | Performed by: NURSE PRACTITIONER

## 2018-04-27 RX ORDER — DULAGLUTIDE 1.5 MG/.5ML
INJECTION, SOLUTION SUBCUTANEOUS
Refills: 1 | COMMUNITY
Start: 2018-03-19 | End: 2018-11-21

## 2018-04-27 RX ORDER — ENALAPRIL MALEATE 20 MG/1
1 TABLET ORAL 2 TIMES DAILY
COMMUNITY
Start: 2018-02-12 | End: 2022-03-21

## 2018-04-27 RX ORDER — PIOGLITAZONEHYDROCHLORIDE 30 MG/1
30 TABLET ORAL DAILY
Refills: 0 | Status: ON HOLD | COMMUNITY
Start: 2018-04-03 | End: 2019-09-03

## 2018-04-27 RX ORDER — FUROSEMIDE 20 MG/1
20 TABLET ORAL 2 TIMES DAILY
Qty: 60 TAB | Refills: 11 | Status: SHIPPED | OUTPATIENT
Start: 2018-04-27 | End: 2019-08-26

## 2018-04-27 RX ORDER — TAMSULOSIN HYDROCHLORIDE 0.4 MG/1
0.4 CAPSULE ORAL
COMMUNITY
End: 2020-02-01

## 2018-04-27 RX ORDER — PEN NEEDLE, DIABETIC 32GX 5/32"
NEEDLE, DISPOSABLE MISCELLANEOUS
Status: ON HOLD | COMMUNITY
Start: 2018-03-07 | End: 2019-09-03

## 2018-04-27 RX ORDER — INSULIN GLARGINE 100 [IU]/ML
52-74 INJECTION, SOLUTION SUBCUTANEOUS 2 TIMES DAILY
COMMUNITY
Start: 2018-03-07

## 2018-04-27 ASSESSMENT — ENCOUNTER SYMPTOMS
PALPITATIONS: 0
DIZZINESS: 0
MYALGIAS: 0
FEVER: 0
ABDOMINAL PAIN: 0
ORTHOPNEA: 0
SHORTNESS OF BREATH: 0
PND: 0
COUGH: 0
CLAUDICATION: 0

## 2018-04-27 NOTE — TELEPHONE ENCOUNTER
extremely swollen legs & swelling all over body   Received: Today   Message Contents   Pauly LEATHA Fabian R.N.   Phone Number: [phone]             FK/foreign     Pt calling to report last nite he experienced swelling all over his body and legs were almost twice the normal size.  Pt is diabetic.  Pt elevated legs during bed time, legs this morning are still swollen but has decreased by about half the size from last nite.     Please call pt at .      Returned patient call.  Pt states feet, legs and hands were swollen last night when he went to bed and he elevated legs and are improved since elevating but remain swollen this AM. Pts HCTZ was discontinued during last OV in January secondary to declining kidney function. Pt states he has not yet seen nephrology. He states his appointment is scheduled for next week. Pt scheduled to see APRN this afternoon at 1:20. MD and APRN alerted.

## 2018-04-27 NOTE — PROGRESS NOTES
Chief Complaint   Patient presents with   • HTN (Uncontrolled)   • Edema       Subjective:   Davie Rodriguez is a 67 y.o. male who presents today with concerns of generalized swelling.    Patient Dr. Pinto.  Patient was last seen in clinic on 1/29/2018.  During that visit, hydrochlorothiazide was discontinued.  Patient comes into the clinic today reporting last night he noticed he was generally swollen.  Patient did notice this mostly in his lower extremities.  Patient did elevate his lower extremities and when he woke up this morning his swelling was better but still present.  Patient reports no changes in medications or foods or increase in sodium intake.    Patient is scheduled to follow as a new patient with nephrology in 1 week.    Besides swelling, patient denies any other symptoms.  Patient reports he has not noticed any changes in breathing or increased shortness of breath. Patient denies chest pain, shortness of breath at rest, with ADLs and exertion, palpitations, dizziness/lightheadedness, orthopnea, PND.     Patient walks about 1.5 miles 3 times a week and has not noticed any symptoms.     Additonally, patient has the following medical problems:    -Hypertension:    -Hyperlipidemia: Taking atorvastatin 40 mg daily    -Obstructive sleep apnea: Using CPAP regularly and night oxygen    -Renal insufficiency: Patient is an established with a nephrologist next week    -Diabetes: Using insulin and glimepiride      Past Medical History:   Diagnosis Date   • Diabetes    • Hyperlipidemia    • Hypertension    • Renal disorder     STONES     Past Surgical History:   Procedure Laterality Date   • PERCUTANEOUS NEPHROSTOLITHOTOMY  6/23/2010    Performed by FOX GARCIA at SURGERY [facility] ORS   • RECOVERY  6/21/2010    Performed by SURGERY, IR-RECOVERY at SURGERY SAME DAY [facility] ORS   • OTHER  1995    GALL BLADDER REMOVED     Family History   Problem Relation Age of Onset   • Heart Attack Father  57   • Other Sister 72     diabetes   • Sleep Apnea Sister      Social History     Social History   • Marital status:      Spouse name: N/A   • Number of children: N/A   • Years of education: N/A     Occupational History   • Not on file.     Social History Main Topics   • Smoking status: Never Smoker   • Smokeless tobacco: Never Used   • Alcohol use No   • Drug use: No   • Sexual activity: Not on file     Other Topics Concern   • Not on file     Social History Narrative   • No narrative on file     Allergies   Allergen Reactions   • Penicillins Swelling     Outpatient Encounter Prescriptions as of 4/27/2018   Medication Sig Dispense Refill   • TRULICITY 1.5 MG/0.5ML Solution Pen-injector INJECT ONE PREFILLED SYRINGE Q WEEK  1   • enalapril (VASOTEC) 20 MG tablet Take 1 Tab by mouth 2 Times a Day.     • Insulin Glargine (BASAGLAR KWIKPEN) 100 UNIT/ML Solution Pen-injector 72 units in AM 65 units in PM     • BD PEN NEEDLE CASIE U/F 32G X 4 MM Misc      • pioglitazone (ACTOS) 30 MG Tab TK 1 T PO QD FOR 90 DAYS  0   • tamsulosin (FLOMAX) 0.4 MG capsule Take 0.4 mg by mouth every bedtime.     • omega-3 acid ethyl esters (LOVAZA) 1 GM capsule Take 2 capsules twice daily. 360 Cap 3   • atorvastatin (LIPITOR) 40 MG Tab Take 1 Tab by mouth every evening. 90 Tab 2   • fenofibrate (TRICOR) 145 MG Tab Take 1 Tab by mouth every day. 90 Tab 3   • carvedilol (COREG) 25 MG Tab Take 1 Tab by mouth 2 times a day, with meals. 180 Tab 3   • Dulaglutide (TRULICITY SC) Inject  as instructed.     • potassium citrate SR (UROCIT-K SR) 10 MEQ (1080 MG) Tab CR Take 2 Tabs by mouth 3 times a day. 550 Tab 3   • finasteride (PROSCAR) 5 MG TABS Take 5 mg by mouth every day.     • aspirin 81 MG tablet Take 81 mg by mouth 2 times a day.     • glimepiride (AMARYL) 4 MG TABS Take 4 mg by mouth 2 Times a Day. 4 mg in AM 2 mg in PM     • Cholecalciferol (VITAMIN D) 2000 UNIT TABS Take 5,000 Units by mouth every day.     • NON SPECIFIED PARTH LOPIC  "ACID 200MG DAILY      • [DISCONTINUED] enalapril (VASOTEC) 10 MG Tab Take 1 Tab by mouth 2 times a day. (Patient taking differently: Take 20 mg by mouth 2 times a day.) 180 Tab 3   • [DISCONTINUED] Insulin Glargine (LANTUS SC) Inject  as instructed.       No facility-administered encounter medications on file as of 4/27/2018.      Review of Systems   Constitutional: Negative for fever and malaise/fatigue.   Respiratory: Negative for cough and shortness of breath.    Cardiovascular: Positive for leg swelling (also generalized). Negative for chest pain, palpitations, orthopnea, claudication and PND.   Gastrointestinal: Negative for abdominal pain.   Musculoskeletal: Negative for myalgias.   Neurological: Negative for dizziness.   All other systems reviewed and are negative.       Objective:   /62   Pulse (!) 56   Ht 1.88 m (6' 2\")   Wt (!) 140.2 kg (309 lb)   SpO2 91%   BMI 39.67 kg/m²     Physical Exam   Constitutional: He is oriented to person, place, and time. He appears well-developed and well-nourished.   HENT:   Head: Normocephalic and atraumatic.   Eyes: EOM are normal. Pupils are equal, round, and reactive to light.   Neck: Normal range of motion. Neck supple. No JVD present.   Cardiovascular: Normal rate, regular rhythm and normal heart sounds.    Pulmonary/Chest: Effort normal and breath sounds normal. No respiratory distress. He has no wheezes. He has no rales.   Abdominal:   Rounded abdomen   Musculoskeletal: He exhibits edema (1+ Bilateral LE edema).   Neurological: He is alert and oriented to person, place, and time.   Skin: Skin is warm and dry.   Psychiatric: He has a normal mood and affect. His behavior is normal.      Lab Results   Component Value Date/Time    CHOLSTRLTOT 125 01/24/2018 09:11 AM    LDL 58 01/24/2018 09:11 AM    HDL 26 (L) 01/24/2018 09:11 AM    TRIGLYCERIDE 203 (H) 01/24/2018 09:11 AM       Lab Results   Component Value Date/Time    SODIUM 139 01/24/2018 09:08 AM    SODIUM " 137 08/15/2013 04:37 PM    POTASSIUM 4.3 01/24/2018 09:08 AM    POTASSIUM 4.1 08/15/2013 04:37 PM    CHLORIDE 107 (H) 01/24/2018 09:08 AM    CHLORIDE 106 08/15/2013 04:37 PM    CO2 24 01/24/2018 09:08 AM    CO2 24 08/15/2013 04:37 PM    GLUCOSE 77 01/24/2018 09:08 AM    GLUCOSE 236 (H) 08/15/2013 04:37 PM    BUN 32 (H) 01/24/2018 09:08 AM    BUN 18 08/15/2013 04:37 PM    CREATININE 1.86 (H) 01/24/2018 09:08 AM    CREATININE 1.35 08/15/2013 04:37 PM    BUNCREATRAT 17 01/24/2018 09:08 AM     Lab Results   Component Value Date/Time    ALKPHOSPHAT 32 (L) 01/24/2018 09:08 AM    ALKPHOSPHAT 55 08/15/2013 04:37 PM    ASTSGOT 9 01/24/2018 09:08 AM    ASTSGOT 14 08/15/2013 04:37 PM    ALTSGPT 17 01/24/2018 09:08 AM    ALTSGPT 25 08/15/2013 04:37 PM    TBILIRUBIN 0.3 01/24/2018 09:08 AM    TBILIRUBIN 0.9 08/15/2013 04:37 PM          Assessment:     1. Swelling  BASIC METABOLIC PANEL    Echocardiogram Comp w/o Cont    furosemide (LASIX) 20 MG Tab   2. Essential hypertension  BASIC METABOLIC PANEL    Echocardiogram Comp w/o Cont    furosemide (LASIX) 20 MG Tab   3. Dyslipidemia  BASIC METABOLIC PANEL    Echocardiogram Comp w/o Cont   4. Elevated coronary artery calcium score: 250 in March 2015  BASIC METABOLIC PANEL    Echocardiogram Comp w/o Cont   5. History of obstructive sleep apnea: History of surgery in approximately 2001  BASIC METABOLIC PANEL    Echocardiogram Comp w/o Cont   6. SHAKA (obstructive sleep apnea)  BASIC METABOLIC PANEL    Echocardiogram Comp w/o Cont    furosemide (LASIX) 20 MG Tab   7. CKD (chronic kidney disease) stage 3, GFR 30-59 ml/min  BASIC METABOLIC PANEL    Echocardiogram Comp w/o Cont       Medical Decision Making:  Today's Assessment / Status / Plan:   1.  Generalized swelling: Will evaluate patient for heart failure or other problems with an echocardiogram  -We will start patient on furosemide 20 mg daily  -Continue potassium as previously prescribed  -BMP in 1 week  -Patient reports she drinks  about 1 gallon of fluid per day, recommended to patient to decrease that to about 2 L per day    2.  Hypertension: Stable  -Continue enalapril 20 mg twice a day  -Continue carvedilol 25 mg twice a day    3. DLD/elevated Coronary calcium: Last LDL 58 on 1/24/2018  -Continue aspirin 81 mg daily  -Continue atorvastatin 40 mg daily  -Continue fenofibrate 145 mg daily  -Continue omega-3 fatty acid    4. Sleep apnea:  -Continue CPAP    5. CKD, Stage 3:   -Patient to follow-up with nephrology next week   -will monitor labs    FU in clinic in 1-1.5 weeks. Sooner if needed.    Patient verbalizes understanding and agrees with the plan of care.     Collaborating MD: Anand Pool MD

## 2018-04-30 ENCOUNTER — HOSPITAL ENCOUNTER (OUTPATIENT)
Dept: CARDIOLOGY | Facility: MEDICAL CENTER | Age: 68
End: 2018-04-30
Attending: NURSE PRACTITIONER
Payer: MEDICARE

## 2018-04-30 DIAGNOSIS — R60.9 SWELLING: ICD-10-CM

## 2018-04-30 DIAGNOSIS — R93.1 ELEVATED CORONARY ARTERY CALCIUM SCORE: ICD-10-CM

## 2018-04-30 DIAGNOSIS — I10 ESSENTIAL HYPERTENSION: ICD-10-CM

## 2018-04-30 DIAGNOSIS — Z86.69 HISTORY OF OBSTRUCTIVE SLEEP APNEA: ICD-10-CM

## 2018-04-30 DIAGNOSIS — G47.33 OSA (OBSTRUCTIVE SLEEP APNEA): ICD-10-CM

## 2018-04-30 DIAGNOSIS — E78.5 DYSLIPIDEMIA: ICD-10-CM

## 2018-04-30 DIAGNOSIS — N18.30 CKD (CHRONIC KIDNEY DISEASE) STAGE 3, GFR 30-59 ML/MIN (HCC): ICD-10-CM

## 2018-04-30 LAB
LV EJECT FRACT  99904: 65
LV EJECT FRACT MOD 2C 99903: 61.36
LV EJECT FRACT MOD 4C 99902: 72.82
LV EJECT FRACT MOD BP 99901: 75.4

## 2018-04-30 PROCEDURE — 93306 TTE W/DOPPLER COMPLETE: CPT | Mod: 26 | Performed by: INTERNAL MEDICINE

## 2018-04-30 PROCEDURE — 93306 TTE W/DOPPLER COMPLETE: CPT

## 2018-05-03 LAB
BUN SERPL-MCNC: 26 MG/DL (ref 8–27)
BUN/CREAT SERPL: 14 (ref 10–24)
CALCIUM SERPL-MCNC: 9.4 MG/DL (ref 8.6–10.2)
CHLORIDE SERPL-SCNC: 108 MMOL/L (ref 96–106)
CO2 SERPL-SCNC: 25 MMOL/L (ref 18–29)
CREAT SERPL-MCNC: 1.8 MG/DL (ref 0.76–1.27)
GFR SERPLBLD CREATININE-BSD FMLA CKD-EPI: 38 ML/MIN/1.73
GFR SERPLBLD CREATININE-BSD FMLA CKD-EPI: 43 ML/MIN/1.73
GLUCOSE SERPL-MCNC: 104 MG/DL (ref 65–99)
POTASSIUM SERPL-SCNC: 4.5 MMOL/L (ref 3.5–5.2)
SODIUM SERPL-SCNC: 147 MMOL/L (ref 134–144)

## 2018-06-12 ENCOUNTER — SLEEP CENTER VISIT (OUTPATIENT)
Dept: SLEEP MEDICINE | Facility: MEDICAL CENTER | Age: 68
End: 2018-06-12
Payer: MEDICARE

## 2018-06-12 VITALS
HEIGHT: 74 IN | SYSTOLIC BLOOD PRESSURE: 110 MMHG | WEIGHT: 305 LBS | DIASTOLIC BLOOD PRESSURE: 60 MMHG | BODY MASS INDEX: 39.14 KG/M2 | RESPIRATION RATE: 15 BRPM | HEART RATE: 70 BPM | OXYGEN SATURATION: 93 %

## 2018-06-12 DIAGNOSIS — G47.33 OSA (OBSTRUCTIVE SLEEP APNEA): ICD-10-CM

## 2018-06-12 PROCEDURE — 99213 OFFICE O/P EST LOW 20 MIN: CPT | Performed by: NURSE PRACTITIONER

## 2018-06-12 NOTE — PROGRESS NOTES
Chief Complaint   Patient presents with   • Follow-Up     2 M    • Apnea       HPI:  Davie Rodriguez is a 67 y.o. year old male here today for follow-up on his obstructive sleep apnea. He had a home sleep study 8/31/2017 which showed an AHI of 65.4, an apnea index of 50.1, and a hypopnea index of 15.4. He did not experience any central apneas. He had 482 desaturations and his desaturation index was 60.5. His baseline saturation was 90%, the average saturation was 85%, and low saturation was 66%. He spent 100% of the time with saturations less than or equal to 90%. He slept almost entirely in the supine position and snoring was noted throughout the study. He was initially diagnosed with sleep apnea 15-20 years ago. Symptoms resolved after UPPP. His wife noted a recurrence of snoring. He was recommended an in lab dedicated titration study which was performed 12/17/2017 and indicated successful titration to a CPAP pressure of 16 CM H20 with a resultant AHI of 6.2 and a mean 02 saturation of 92%. He was started on Auto CPAP 10-16 CM H20. His compliance card download at his last office visit indicated an AHI of 16.8 with a peak average pressure of 14.3 and an average use of over 5 hours at night. His chip indicated evidence of a mask leak. His pressures were increased 12-16 CM H20. He was recommended to replace his mask inner liner.   He tolerates the new pressure well. He has a full face mask. He does feel he is able to get a good seal on his current mask. He is unsure of whether or not he sleeps better on therapy. He is still occasionally tired during the day, but does feel his energy levels have improved some. He denies any morning headaches. He does feel he sleeps 7 hours at night. He does feel he is sleeping longer at night.   Repeat compliance card download today in the office indicates an AHI of 10.1 with an average use of over 6 hours at night. His peak average pressure is 15.2. He does still have  evidence of a mask leak.            Past Medical History:   Diagnosis Date   • Diabetes    • Hyperlipidemia    • Hypertension    • Renal disorder     STONES       Past Surgical History:   Procedure Laterality Date   • PERCUTANEOUS NEPHROSTOLITHOTOMY  6/23/2010    Performed by FOX GARCIA at SURGERY [facility] ORS   • RECOVERY  6/21/2010    Performed by SURGERY, IR-RECOVERY at SURGERY SAME DAY [facility] ORS   • OTHER  1995    GALL BLADDER REMOVED       Family History   Problem Relation Age of Onset   • Heart Attack Father 57   • Other Sister 72     diabetes   • Sleep Apnea Sister        Social History     Social History   • Marital status:      Spouse name: N/A   • Number of children: N/A   • Years of education: N/A     Occupational History   • Not on file.     Social History Main Topics   • Smoking status: Never Smoker   • Smokeless tobacco: Never Used   • Alcohol use No   • Drug use: No   • Sexual activity: Not on file     Other Topics Concern   • Not on file     Social History Narrative   • No narrative on file       ROS:  Constitutional: Denies fevers, chills, sweats, weight loss  Eyes: Denies glasses, vision loss, pain, drainage, double vision  Ears/Nose/Mouth/Throat: Denies rhinitis, nasal congestion, ear ache, difficulty hearing, sore throat, persistent hoarseness, decayed teeth/toothache  Cardiovascular: Denies chest pain, tightness, palpitations, swelling in feet/legs, fainting, difficulty breathing when laying down  Respiratory: Denies shortness of breath, cough, sputum, wheezing, painful breathing, coughing up blood  GI: Denies heartburn, difficulty swallowing, nausea, vomiting, abdominal pain, diarrhea, constipation  : Denies frequent urination, painful urination  Integumentary: Denies rashes, lumps or color changes  MSK: Denies painful joints, sore muscles, and back pain.   Neurological: Denies frequent headaches, dizziness, weakness  Sleep: See HPI       Current Outpatient  "Prescriptions   Medication Sig Dispense Refill   • TRULICITY 1.5 MG/0.5ML Solution Pen-injector INJECT ONE PREFILLED SYRINGE Q WEEK  1   • enalapril (VASOTEC) 20 MG tablet Take 1 Tab by mouth 2 Times a Day.     • Insulin Glargine (BASAGLAR KWIKPEN) 100 UNIT/ML Solution Pen-injector 72 units in AM 65 units in PM     • BD PEN NEEDLE CASIE U/F 32G X 4 MM Misc      • pioglitazone (ACTOS) 30 MG Tab TK 1 T PO QD FOR 90 DAYS  0   • tamsulosin (FLOMAX) 0.4 MG capsule Take 0.4 mg by mouth every bedtime.     • furosemide (LASIX) 20 MG Tab Take 1 Tab by mouth 2 times a day. 60 Tab 11   • omega-3 acid ethyl esters (LOVAZA) 1 GM capsule Take 2 capsules twice daily. 360 Cap 3   • atorvastatin (LIPITOR) 40 MG Tab Take 1 Tab by mouth every evening. 90 Tab 2   • fenofibrate (TRICOR) 145 MG Tab Take 1 Tab by mouth every day. 90 Tab 3   • carvedilol (COREG) 25 MG Tab Take 1 Tab by mouth 2 times a day, with meals. 180 Tab 3   • Dulaglutide (TRULICITY SC) Inject  as instructed.     • potassium citrate SR (UROCIT-K SR) 10 MEQ (1080 MG) Tab CR Take 2 Tabs by mouth 3 times a day. 550 Tab 3   • finasteride (PROSCAR) 5 MG TABS Take 5 mg by mouth every day.     • aspirin 81 MG tablet Take 81 mg by mouth 2 times a day.     • glimepiride (AMARYL) 4 MG TABS Take 4 mg by mouth 2 Times a Day. 4 mg in AM 2 mg in PM     • NON SPECIFIED PARTH LOPIC ACID 200MG DAILY      • Cholecalciferol (VITAMIN D) 2000 UNIT TABS Take 5,000 Units by mouth every day.       No current facility-administered medications for this visit.        Allergies   Allergen Reactions   • Penicillins Swelling       Blood pressure 110/60, pulse 70, resp. rate 15, height 1.88 m (6' 2\"), weight (!) 138.3 kg (305 lb), SpO2 93 %.    PE:   Appearance: Well developed, well nourished, no acute distress  Eyes: PERRL, EOM intact, sclera white, conjunctiva moist  Ears: no lesions or deformities  Hearing: grossly intact  Nose: no lesions or deformities  Oropharynx: tongue normal, posterior " pharynx without erythema or exudate  Mallampati Classification: Class 4  Neck: supple, trachea midline, no masses   Respiratory effort: no intercostal retractions or use of accessory muscles  Lung auscultation: no rales, rhonchi or wheezes  Heart auscultation: no murmur rub or gallop  Extremities: no cyanosis or edema  Abdomen: soft ,non tender, no masses  Gait and Station: normal  Digits and nails: no clubbing, cyanosis, petechiae or nodes.  Cranial nerves: grossly intact  Skin: no rashes, lesions or ulcers noted  Orientation: Oriented to time, person and place  Mood and affect: mood and affect appropriate, normal interaction with examiner  Judgement: Intact          Assessment:    1. SHAKA (obstructive sleep apnea)     2. BMI 39.0-39.9,adult           Plan:    1) Continue Auto CPAP @ 12-16 CM H20. His AHI has improved. He does have a persistent mask leak. However he feels his current mask is a good fit. Discussed importance of cleaning his mask and supplies and encouraged routine replacement of his inner liner. Consider fit for a new mask. He declines need at this time.   2) Sleep hygiene discussed. Recommend keeping a set sleep/wake schedule. Logging enough hours of sleep. Limiting/Avoiding naps. No caffeine after noon and no heavy meals in the evening. Recommend daily exercise.   3) Weight loss recommended. He is walking daily and has made dietary changes. He is frustrated that he is unable to lose weight. Offered Referral to [facility] weight [facility] program and referral to a dietician. He will consider.   4) Follow up in 6 months with repeat compliance card download, sooner if needed.

## 2018-06-12 NOTE — PATIENT INSTRUCTIONS
Plan:    1) Continue Auto CPAP @ 12-16 CM H20. His AHI has improved. He does have a persistent mask leak. However he feels his current mask is a good fit. Discussed importance of cleaning his mask and supplies and encouraged routine replacement of his inner liner. Consider fit for a new mask. He declines need at this time.   2) Sleep hygiene discussed. Recommend keeping a set sleep/wake schedule. Logging enough hours of sleep. Limiting/Avoiding naps. No caffeine after noon and no heavy meals in the evening. Recommend daily exercise.   3) Weight loss recommended. He is walking daily and has made dietary changes. He is frustrated that he is unable to lose weight. Offered Referral to [facility]'s weight loss program and referral to a dietician. He will consider.   4) Follow up in 6 months with repeat compliance card download, sooner if needed.

## 2018-06-19 ENCOUNTER — HOSPITAL ENCOUNTER (OUTPATIENT)
Dept: RADIOLOGY | Facility: MEDICAL CENTER | Age: 68
End: 2018-06-19
Attending: INTERNAL MEDICINE
Payer: MEDICARE

## 2018-06-19 DIAGNOSIS — N18.30 CHRONIC KIDNEY DISEASE, STAGE III (MODERATE) (HCC): ICD-10-CM

## 2018-06-19 PROCEDURE — 76775 US EXAM ABDO BACK WALL LIM: CPT

## 2018-07-10 DIAGNOSIS — I10 ESSENTIAL HYPERTENSION: ICD-10-CM

## 2018-07-10 DIAGNOSIS — E87.6 HYPOKALEMIA: ICD-10-CM

## 2018-07-10 RX ORDER — CARVEDILOL 25 MG/1
25 TABLET ORAL 2 TIMES DAILY WITH MEALS
Qty: 180 TAB | Refills: 3 | Status: SHIPPED | OUTPATIENT
Start: 2018-07-10 | End: 2020-02-01

## 2018-07-10 RX ORDER — POTASSIUM CITRATE 10 MEQ/1
20 TABLET, EXTENDED RELEASE ORAL 3 TIMES DAILY
Qty: 550 TAB | Refills: 3 | Status: SHIPPED | OUTPATIENT
Start: 2018-07-10 | End: 2018-10-24

## 2018-07-24 ENCOUNTER — TELEPHONE (OUTPATIENT)
Dept: CARDIOLOGY | Facility: MEDICAL CENTER | Age: 68
End: 2018-07-24

## 2018-07-24 ENCOUNTER — OFFICE VISIT (OUTPATIENT)
Dept: CARDIOLOGY | Facility: MEDICAL CENTER | Age: 68
End: 2018-07-24
Payer: MEDICARE

## 2018-07-24 VITALS
HEIGHT: 74 IN | DIASTOLIC BLOOD PRESSURE: 56 MMHG | OXYGEN SATURATION: 95 % | BODY MASS INDEX: 38.24 KG/M2 | HEART RATE: 50 BPM | SYSTOLIC BLOOD PRESSURE: 120 MMHG | WEIGHT: 298 LBS

## 2018-07-24 DIAGNOSIS — R93.1 ELEVATED CORONARY ARTERY CALCIUM SCORE: ICD-10-CM

## 2018-07-24 DIAGNOSIS — I10 ESSENTIAL HYPERTENSION: ICD-10-CM

## 2018-07-24 DIAGNOSIS — E78.5 DYSLIPIDEMIA: ICD-10-CM

## 2018-07-24 PROCEDURE — 99214 OFFICE O/P EST MOD 30 MIN: CPT | Performed by: INTERNAL MEDICINE

## 2018-07-24 RX ORDER — HYDROCHLOROTHIAZIDE 12.5 MG/1
12.5 TABLET ORAL DAILY
COMMUNITY
End: 2019-08-26

## 2018-07-24 NOTE — LETTER
[facility]   [address] [place] [address] [address]  [place] [address]  Phone: [phone]  Fax: [phone]              Davie Rodriguez  1950    Encounter Date: 7/24/2018    Wesley Sewell M.D.          PROGRESS NOTE:  Chief Complaint   Patient presents with   • Hyperlipidemia     F/V: 6 MO/ LABS & ECHO IN EPIC       Subjective:   Davie Rodriguez is a 67 y.o. male who presents today for followup of his hypertension and hyperlipidemia. He also has diabetes, obstructive sleep apnea and mild renal insufficiency.    His blood pressures at home been running from approximately 120-160/60-65.    At the time of his last visit, we discontinued HCTZ.  He came in in April because of increasing edema.  He was started on low-dose furosemide at that time.  He has had no recurrent edema.    He has had no chest discomfort.  He can walk 3 miles and is mildly dyspneic with this but otherwise has no dyspnea on exertion.  He denies any PND orthopnea but is on CPAP therapy at night.  He has had no palpitations or lightheadedness.        Past Medical History:   Diagnosis Date   • Diabetes    • Hyperlipidemia    • Hypertension    • Renal disorder     STONES     Past Surgical History:   Procedure Laterality Date   • PERCUTANEOUS NEPHROSTOLITHOTOMY  6/23/2010    Performed by FOX GARCIA at SURGERY [facility] ORS   • RECOVERY  6/21/2010    Performed by SURGERY, IR-RECOVERY at SURGERY SAME DAY [facility] ORS   • OTHER  1995    GALL BLADDER REMOVED     Family History   Problem Relation Age of Onset   • Heart Attack Father 57   • Other Sister 72     diabetes   • Sleep Apnea Sister      Social History     Social History   • Marital status:      Spouse name: N/A   • Number of children: N/A   • Years of education: N/A     Occupational History   • Not on file.     Social History Main Topics   • Smoking status: Never Smoker   • Smokeless tobacco: Never Used   • Alcohol use  No   • Drug use: No   • Sexual activity: Not on file     Other Topics Concern   • Not on file     Social History Narrative   • No narrative on file     Allergies   Allergen Reactions   • Penicillins Swelling     Outpatient Encounter Prescriptions as of 7/24/2018   Medication Sig Dispense Refill   • hydroCHLOROthiazide (HYDRODIURIL) 12.5 MG tablet Take 12.5 mg by mouth every day.     • carvedilol (COREG) 25 MG Tab Take 1 Tab by mouth 2 times a day, with meals. 180 Tab 3   • potassium citrate SR (UROCIT-K SR) 10 MEQ (1080 MG) Tab CR Take 2 Tabs by mouth 3 times a day. (Patient taking differently: Take 20 mEq by mouth 2 Times a Day.) 550 Tab 3   • enalapril (VASOTEC) 20 MG tablet Take 1 Tab by mouth 2 Times a Day.     • Insulin Glargine (BASAGLAR KWIKPEN) 100 UNIT/ML Solution Pen-injector 74 units     • BD PEN NEEDLE CASIE U/F 32G X 4 MM Misc      • pioglitazone (ACTOS) 30 MG Tab TK 1 T PO QD FOR 90 DAYS  0   • tamsulosin (FLOMAX) 0.4 MG capsule Take 0.4 mg by mouth every bedtime.     • furosemide (LASIX) 20 MG Tab Take 1 Tab by mouth 2 times a day. 60 Tab 11   • omega-3 acid ethyl esters (LOVAZA) 1 GM capsule Take 2 capsules twice daily. 360 Cap 3   • atorvastatin (LIPITOR) 40 MG Tab Take 1 Tab by mouth every evening. 90 Tab 2   • fenofibrate (TRICOR) 145 MG Tab Take 1 Tab by mouth every day. 90 Tab 3   • Dulaglutide (TRULICITY) 1.5 MG/0.5ML Solution Pen-injector Inject  as instructed every 7 days.     • finasteride (PROSCAR) 5 MG TABS Take 5 mg by mouth every day.     • aspirin 81 MG tablet Take 81 mg by mouth 2 times a day.     • glimepiride (AMARYL) 4 MG TABS Take 4 mg by mouth 2 Times a Day. 4 mg in AM 2 mg in PM     • Cholecalciferol (VITAMIN D) 2000 UNIT TABS Take 4,000 Units by mouth every day.     • TRULICITY 1.5 MG/0.5ML Solution Pen-injector INJECT ONE PREFILLED SYRINGE Q WEEK  1   • NON SPECIFIED PARTH LOPIC ACID 200MG DAILY        No facility-administered encounter medications on file as of 7/24/2018.       "    ROS     Objective:   /56   Pulse (!) 50   Ht 1.88 m (6' 2\")   Wt (!) 135.2 kg (298 lb)   SpO2 95%   BMI 38.26 kg/m²      Physical Exam   Constitutional: He appears well-developed and well-nourished.   Neck: No JVD present.   Cardiovascular: Normal rate and regular rhythm.    No murmur heard.  Pulmonary/Chest: Effort normal and breath sounds normal. He has no rales.   Abdominal: Soft. There is no tenderness.   Musculoskeletal: He exhibits edema (Trace to 1+ pretibial).     Lab Results   Component Value Date/Time    CHOLSTRLTOT 125 01/24/2018 09:11 AM    LDL 58 01/24/2018 09:11 AM    HDL 26 (L) 01/24/2018 09:11 AM    TRIGLYCERIDE 203 (H) 01/24/2018 09:11 AM       Lab Results   Component Value Date/Time    SODIUM 147 (H) 05/01/2018 07:11 AM    SODIUM 137 08/15/2013 04:37 PM    POTASSIUM 4.5 05/01/2018 07:11 AM    POTASSIUM 4.1 08/15/2013 04:37 PM    CHLORIDE 108 (H) 05/01/2018 07:11 AM    CHLORIDE 106 08/15/2013 04:37 PM    CO2 25 05/01/2018 07:11 AM    CO2 24 08/15/2013 04:37 PM    GLUCOSE 104 (H) 05/01/2018 07:11 AM    GLUCOSE 236 (H) 08/15/2013 04:37 PM    BUN 26 05/01/2018 07:11 AM    BUN 18 08/15/2013 04:37 PM    CREATININE 1.80 (H) 05/01/2018 07:11 AM    CREATININE 1.35 08/15/2013 04:37 PM    BUNCREATRAT 14 05/01/2018 07:11 AM     Lab Results   Component Value Date/Time    ALKPHOSPHAT 32 (L) 01/24/2018 09:08 AM    ALKPHOSPHAT 55 08/15/2013 04:37 PM    ASTSGOT 9 01/24/2018 09:08 AM    ASTSGOT 14 08/15/2013 04:37 PM    ALTSGPT 17 01/24/2018 09:08 AM    ALTSGPT 25 08/15/2013 04:37 PM    TBILIRUBIN 0.3 01/24/2018 09:08 AM    TBILIRUBIN 0.9 08/15/2013 04:37 PM      No results found for: BNPBTYPENAT       Assessment:     1. Essential hypertension     2. Elevated coronary artery calcium score: 250 in March 2015     3. Dyslipidemia         Medical Decision Making:  Today's Assessment / Status / Plan:     Mr. Rodriguez is clinically stable.  He does have renal insufficiency and difficulty with edema.  His " blood pressure today is low normal.  Given his diabetic status I would not want him to be significantly below 120 systolic.  His blood pressures at home are elevated but not here today.  I would like him to bring in his blood pressure cuff for correlation with the wall manometer.  If his blood pressures at home are actually falsely elevated, we may be able to cut back on his medical therapy slightly.  He will follow-up in about 3 months.  His lipid status appears to be under fairly good control.  His triglycerides are mildly elevated but he is on omega-3 fatty acids and fenofibrate.      CYDNEY Cabral.  [address] [place] [id number]  VIA Facsimile: [phone]

## 2018-07-24 NOTE — PROGRESS NOTES
Chief Complaint   Patient presents with   • Hyperlipidemia     F/V: 6 MO/ LABS & ECHO IN EPIC       Subjective:   Davie Rodriguez is a 67 y.o. male who presents today for followup of his hypertension and hyperlipidemia. He also has diabetes, obstructive sleep apnea and mild renal insufficiency.    His blood pressures at home been running from approximately 120-160/60-65.    At the time of his last visit, we discontinued HCTZ.  He was admitted to hospital in April because of increasing edema.  He was started on low-dose furosemide at that time.  He has had no recurrent edema.    He has had no chest discomfort.  He can walk 3 miles and is mildly dyspneic with this but otherwise has no dyspnea on exertion.  He denies any PND orthopnea but is on CPAP therapy at night.  He has had no palpitations or lightheadedness.        Past Medical History:   Diagnosis Date   • Diabetes    • Hyperlipidemia    • Hypertension    • Renal disorder     STONES     Past Surgical History:   Procedure Laterality Date   • PERCUTANEOUS NEPHROSTOLITHOTOMY  6/23/2010    Performed by FOX GARCIA at SURGERY [facility] ORS   • RECOVERY  6/21/2010    Performed by KENYATTA SCHWARTZ at SURGERY SAME DAY [facility] ORS   • OTHER  1995    GALL BLADDER REMOVED     Family History   Problem Relation Age of Onset   • Heart Attack Father 57   • Other Sister 72     diabetes   • Sleep Apnea Sister      Social History     Social History   • Marital status:      Spouse name: N/A   • Number of children: N/A   • Years of education: N/A     Occupational History   • Not on file.     Social History Main Topics   • Smoking status: Never Smoker   • Smokeless tobacco: Never Used   • Alcohol use No   • Drug use: No   • Sexual activity: Not on file     Other Topics Concern   • Not on file     Social History Narrative   • No narrative on file     Allergies   Allergen Reactions   • Penicillins Swelling     Outpatient Encounter Prescriptions as of  "7/24/2018   Medication Sig Dispense Refill   • hydroCHLOROthiazide (HYDRODIURIL) 12.5 MG tablet Take 12.5 mg by mouth every day.     • carvedilol (COREG) 25 MG Tab Take 1 Tab by mouth 2 times a day, with meals. 180 Tab 3   • potassium citrate SR (UROCIT-K SR) 10 MEQ (1080 MG) Tab CR Take 2 Tabs by mouth 3 times a day. (Patient taking differently: Take 20 mEq by mouth 2 Times a Day.) 550 Tab 3   • enalapril (VASOTEC) 20 MG tablet Take 1 Tab by mouth 2 Times a Day.     • Insulin Glargine (BASAGLAR KWIKPEN) 100 UNIT/ML Solution Pen-injector 74 units     • BD PEN NEEDLE CASIE U/F 32G X 4 MM Misc      • pioglitazone (ACTOS) 30 MG Tab TK 1 T PO QD FOR 90 DAYS  0   • tamsulosin (FLOMAX) 0.4 MG capsule Take 0.4 mg by mouth every bedtime.     • furosemide (LASIX) 20 MG Tab Take 1 Tab by mouth 2 times a day. 60 Tab 11   • omega-3 acid ethyl esters (LOVAZA) 1 GM capsule Take 2 capsules twice daily. 360 Cap 3   • atorvastatin (LIPITOR) 40 MG Tab Take 1 Tab by mouth every evening. 90 Tab 2   • fenofibrate (TRICOR) 145 MG Tab Take 1 Tab by mouth every day. 90 Tab 3   • Dulaglutide (TRULICITY) 1.5 MG/0.5ML Solution Pen-injector Inject  as instructed every 7 days.     • finasteride (PROSCAR) 5 MG TABS Take 5 mg by mouth every day.     • aspirin 81 MG tablet Take 81 mg by mouth 2 times a day.     • glimepiride (AMARYL) 4 MG TABS Take 4 mg by mouth 2 Times a Day. 4 mg in AM 2 mg in PM     • Cholecalciferol (VITAMIN D) 2000 UNIT TABS Take 4,000 Units by mouth every day.     • TRULICITY 1.5 MG/0.5ML Solution Pen-injector INJECT ONE PREFILLED SYRINGE Q WEEK  1   • NON SPECIFIED PARTH LOPIC ACID 200MG DAILY        No facility-administered encounter medications on file as of 7/24/2018.      ROS     Objective:   /56   Pulse (!) 50   Ht 1.88 m (6' 2\")   Wt (!) 135.2 kg (298 lb)   SpO2 95%   BMI 38.26 kg/m²     Physical Exam   Constitutional: He appears well-developed and well-nourished.   Neck: No JVD present.   Cardiovascular: " Normal rate and regular rhythm.    No murmur heard.  Pulmonary/Chest: Effort normal and breath sounds normal. He has no rales.   Abdominal: Soft. There is no tenderness.   Musculoskeletal: He exhibits edema (Trace to 1+ pretibial).     Lab Results   Component Value Date/Time    CHOLSTRLTOT 125 01/24/2018 09:11 AM    LDL 58 01/24/2018 09:11 AM    HDL 26 (L) 01/24/2018 09:11 AM    TRIGLYCERIDE 203 (H) 01/24/2018 09:11 AM       Lab Results   Component Value Date/Time    SODIUM 147 (H) 05/01/2018 07:11 AM    SODIUM 137 08/15/2013 04:37 PM    POTASSIUM 4.5 05/01/2018 07:11 AM    POTASSIUM 4.1 08/15/2013 04:37 PM    CHLORIDE 108 (H) 05/01/2018 07:11 AM    CHLORIDE 106 08/15/2013 04:37 PM    CO2 25 05/01/2018 07:11 AM    CO2 24 08/15/2013 04:37 PM    GLUCOSE 104 (H) 05/01/2018 07:11 AM    GLUCOSE 236 (H) 08/15/2013 04:37 PM    BUN 26 05/01/2018 07:11 AM    BUN 18 08/15/2013 04:37 PM    CREATININE 1.80 (H) 05/01/2018 07:11 AM    CREATININE 1.35 08/15/2013 04:37 PM    BUNCREATRAT 14 05/01/2018 07:11 AM     Lab Results   Component Value Date/Time    ALKPHOSPHAT 32 (L) 01/24/2018 09:08 AM    ALKPHOSPHAT 55 08/15/2013 04:37 PM    ASTSGOT 9 01/24/2018 09:08 AM    ASTSGOT 14 08/15/2013 04:37 PM    ALTSGPT 17 01/24/2018 09:08 AM    ALTSGPT 25 08/15/2013 04:37 PM    TBILIRUBIN 0.3 01/24/2018 09:08 AM    TBILIRUBIN 0.9 08/15/2013 04:37 PM      No results found for: BNPBTYPENAT       Assessment:     1. Essential hypertension     2. Elevated coronary artery calcium score: 250 in March 2015     3. Dyslipidemia         Medical Decision Making:  Today's Assessment / Status / Plan:     Mr. Rodriguez is clinically stable.  He does have renal insufficiency and difficulty with edema.  His blood pressure today is low normal.  Given his diabetic status I would not want him to be significantly below 120 systolic.  His blood pressures at home are elevated but not here today.  I would like him to bring in his blood pressure cuff for correlation  with the wall manometer.  If his blood pressures at home are actually falsely elevated, we may be able to cut back on his medical therapy slightly.  He will follow-up in about 3 months.  His lipid status appears to be under fairly good control.  His triglycerides are mildly elevated but he is on omega-3 fatty acids and fenofibrate.

## 2018-10-24 ENCOUNTER — OFFICE VISIT (OUTPATIENT)
Dept: CARDIOLOGY | Facility: MEDICAL CENTER | Age: 68
End: 2018-10-24
Payer: MEDICARE

## 2018-10-24 VITALS
BODY MASS INDEX: 38.41 KG/M2 | HEART RATE: 58 BPM | SYSTOLIC BLOOD PRESSURE: 122 MMHG | HEIGHT: 74 IN | DIASTOLIC BLOOD PRESSURE: 58 MMHG | OXYGEN SATURATION: 94 % | WEIGHT: 299.3 LBS

## 2018-10-24 DIAGNOSIS — R93.1 ELEVATED CORONARY ARTERY CALCIUM SCORE: ICD-10-CM

## 2018-10-24 DIAGNOSIS — I10 ESSENTIAL HYPERTENSION: ICD-10-CM

## 2018-10-24 DIAGNOSIS — E78.5 DYSLIPIDEMIA: ICD-10-CM

## 2018-10-24 PROCEDURE — 99214 OFFICE O/P EST MOD 30 MIN: CPT | Performed by: INTERNAL MEDICINE

## 2018-10-24 RX ORDER — SPIRONOLACTONE 25 MG/1
25 TABLET ORAL DAILY
Qty: 30 TAB | Refills: 11 | Status: SHIPPED | OUTPATIENT
Start: 2018-10-24 | End: 2018-11-02

## 2018-10-24 NOTE — LETTER
[facility]   [address], [address]  [place] [address]  Phone: [phone]  Fax: [phone]              Davie Rodriguez  1950    Encounter Date: 10/24/2018    Wesley Sewell M.D.          PROGRESS NOTE:  Chief Complaint   Patient presents with   • HTN (Controlled)     x3mon. f/v       Subjective:   Davie Rodriguez is a 67 y.o. male who presents today  for followup of his hypertension and hyperlipidemia. He also has diabetes, obstructive sleep apnea and mild renal insufficiency.    He brings in a record of his blood pressures and they are somewhat labile.  In general they run up from the 130s-140 systolic.  Occasionally drop down to about 115 systolic.  Diastolic pressures are generally in the 60s.    He had one episode of some left ankle edema since his last visit but this resolved.  He denies any chest discomfort, dyspnea, palpitations or lightheadedness.      Past Medical History:   Diagnosis Date   • Diabetes    • Hyperlipidemia    • Hypertension    • Renal disorder     STONES     Past Surgical History:   Procedure Laterality Date   • PERCUTANEOUS NEPHROSTOLITHOTOMY  6/23/2010    Performed by FOX GARCIA at SURGERY [facility] ORS   • RECOVERY  6/21/2010    Performed by SURGERY, IR-RECOVERY at SURGERY SAME DAY [facility] ORS   • OTHER  1995    GALL BLADDER REMOVED     Family History   Problem Relation Age of Onset   • Heart Attack Father 57   • Other Sister 72        diabetes   • Sleep Apnea Sister      Social History     Social History   • Marital status:      Spouse name: N/A   • Number of children: N/A   • Years of education: N/A     Occupational History   • Not on file.     Social History Main Topics   • Smoking status: Never Smoker   • Smokeless tobacco: Never Used   • Alcohol use No   • Drug use: No   • Sexual activity: Not on file     Other Topics Concern   • Not on file     Social History Narrative   • No narrative  "on file     Allergies   Allergen Reactions   • Penicillins Swelling     Outpatient Encounter Prescriptions as of 10/24/2018   Medication Sig Dispense Refill   • hydroCHLOROthiazide (HYDRODIURIL) 12.5 MG tablet Take 12.5 mg by mouth every day.     • carvedilol (COREG) 25 MG Tab Take 1 Tab by mouth 2 times a day, with meals. 180 Tab 3   • potassium citrate SR (UROCIT-K SR) 10 MEQ (1080 MG) Tab CR Take 2 Tabs by mouth 3 times a day. (Patient taking differently: Take 20 mEq by mouth 2 Times a Day.) 550 Tab 3   • enalapril (VASOTEC) 20 MG tablet Take 1 Tab by mouth 2 Times a Day.     • Insulin Glargine (BASAGLAR KWIKPEN) 100 UNIT/ML Solution Pen-injector 74 units     • BD PEN NEEDLE CASIE U/F 32G X 4 MM Misc      • pioglitazone (ACTOS) 30 MG Tab TK 1 T PO QD FOR 90 DAYS  0   • tamsulosin (FLOMAX) 0.4 MG capsule Take 0.4 mg by mouth every bedtime.     • furosemide (LASIX) 20 MG Tab Take 1 Tab by mouth 2 times a day. 60 Tab 11   • omega-3 acid ethyl esters (LOVAZA) 1 GM capsule Take 2 capsules twice daily. 360 Cap 3   • atorvastatin (LIPITOR) 40 MG Tab Take 1 Tab by mouth every evening. 90 Tab 2   • fenofibrate (TRICOR) 145 MG Tab Take 1 Tab by mouth every day. 90 Tab 3   • Dulaglutide (TRULICITY) 1.5 MG/0.5ML Solution Pen-injector Inject  as instructed every 7 days.     • finasteride (PROSCAR) 5 MG TABS Take 5 mg by mouth every day.     • aspirin 81 MG tablet Take 81 mg by mouth 2 times a day.     • glimepiride (AMARYL) 4 MG TABS Take 4 mg by mouth 2 Times a Day. 4 mg in AM 2 mg in PM     • Cholecalciferol (VITAMIN D) 2000 UNIT TABS Take 4,000 Units by mouth every day.     • TRULICITY 1.5 MG/0.5ML Solution Pen-injector INJECT ONE PREFILLED SYRINGE Q WEEK  1   • NON SPECIFIED PARTH LOPIC ACID 200MG DAILY        No facility-administered encounter medications on file as of 10/24/2018.      ROS     Objective:   /58 (BP Location: Left arm, Patient Position: Sitting, BP Cuff Size: Adult)   Pulse (!) 58   Ht 1.88 m (6' 2\") "   Wt (!) 135.8 kg (299 lb 4.8 oz)   SpO2 94%   BMI 38.43 kg/m²      Physical Exam   Constitutional: He appears well-developed and well-nourished.   Neck: No JVD present.   Cardiovascular: Normal rate and regular rhythm.    No murmur heard.  Pulmonary/Chest: Effort normal and breath sounds normal. He has no rales.   Abdominal: Soft. There is no tenderness.   Musculoskeletal: He exhibits edema (Trace pretibial).         Lab Results   Component Value Date/Time    CHOLSTRLTOT 125 01/24/2018 09:11 AM    LDL 58 01/24/2018 09:11 AM    HDL 26 (L) 01/24/2018 09:11 AM    TRIGLYCERIDE 203 (H) 01/24/2018 09:11 AM       Lab Results   Component Value Date/Time    SODIUM 147 (H) 05/01/2018 07:11 AM    SODIUM 137 08/15/2013 04:37 PM    POTASSIUM 4.5 05/01/2018 07:11 AM    POTASSIUM 4.1 08/15/2013 04:37 PM    CHLORIDE 108 (H) 05/01/2018 07:11 AM    CHLORIDE 106 08/15/2013 04:37 PM    CO2 25 05/01/2018 07:11 AM    CO2 24 08/15/2013 04:37 PM    GLUCOSE 104 (H) 05/01/2018 07:11 AM    GLUCOSE 236 (H) 08/15/2013 04:37 PM    BUN 26 05/01/2018 07:11 AM    BUN 18 08/15/2013 04:37 PM    CREATININE 1.80 (H) 05/01/2018 07:11 AM    CREATININE 1.35 08/15/2013 04:37 PM    BUNCREATRAT 14 05/01/2018 07:11 AM     Lab Results   Component Value Date/Time    ALKPHOSPHAT 32 (L) 01/24/2018 09:08 AM    ALKPHOSPHAT 55 08/15/2013 04:37 PM    ASTSGOT 9 01/24/2018 09:08 AM    ASTSGOT 14 08/15/2013 04:37 PM    ALTSGPT 17 01/24/2018 09:08 AM    ALTSGPT 25 08/15/2013 04:37 PM    TBILIRUBIN 0.3 01/24/2018 09:08 AM    TBILIRUBIN 0.9 08/15/2013 04:37 PM      No results found for: BNPBTYPENAT     Echocardiography Laboratory    CONCLUSIONS  No prior study is available for comparison.   Technically difficult but adequate study for interpretation.   Mild concentric left ventricular hypertrophy.  Normal left ventricular chamber size.  Normal left ventricular systolic function.  Left ventricular ejection fraction is visually estimated to be 65%.  Grossly normal  regional wall motion.  Mild aortic insufficiency.  Mildly dilated left atrium.  No mitral stenosis or regurgitation.  Vena cava is not well visualized.  Estimated right ventricular systolic pressure  is 55-60 mmHg.  Right heart pressures are consistent with moderate pulmonary   hypertension.      EZEQUIEL RODRIGUEZ  Exam Date:         04/30/2018         Assessment:     1. Elevated coronary artery calcium score: 250 in March 2015     2. Essential hypertension     3. Dyslipidemia         Medical Decision Making:  Today's Assessment / Status / Plan:     Mr. Rodriguez is clinically stable though his blood pressure is somewhat labile.  At this time, we will start low-dose Spironolactone and discontinue potassium supplementation.  He will have lab work in about 1 week and 3 weeks with follow-up in about a month.  His lipid status is under fairly good control.  He might be considered for Jardiance given his elevated cardiovascular risk.  We will leave this up to his endocrinologist.      CYDNEY Cabral.  [address]  [place] [address]  VIA Facsimile: [phone]     Anahi Houston M.D.  [address] [place] [address]  VIA Facsimile: [phone]

## 2018-10-24 NOTE — PROGRESS NOTES
Chief Complaint   Patient presents with   • HTN (Controlled)     x3mon. f/v       Subjective:   Davie Rodriguez is a 67 y.o. male who presents today  for followup of his hypertension and hyperlipidemia. He also has diabetes, obstructive sleep apnea and mild renal insufficiency.    He brings in a record of his blood pressures and they are somewhat labile.  In general they run up from the 130s-140 systolic.  Occasionally, they will drop down to about 115 systolic.  Diastolic pressures are generally in the 60s.    He had one episode of some left ankle edema since his last visit but this resolved.  He denies any chest discomfort, dyspnea, palpitations or lightheadedness.      Past Medical History:   Diagnosis Date   • Diabetes    • Hyperlipidemia    • Hypertension    • Renal disorder     STONES     Past Surgical History:   Procedure Laterality Date   • PERCUTANEOUS NEPHROSTOLITHOTOMY  6/23/2010    Performed by FOX GARCIA at SURGERY [facility] ORS   • RECOVERY  6/21/2010    Performed by LANA, IR-RECOVERY at SURGERY SAME DAY [facility] ORS   • OTHER  1995    GALL BLADDER REMOVED     Family History   Problem Relation Age of Onset   • Heart Attack Father 57   • Other Sister 72        diabetes   • Sleep Apnea Sister      Social History     Social History   • Marital status:      Spouse name: N/A   • Number of children: N/A   • Years of education: N/A     Occupational History   • Not on file.     Social History Main Topics   • Smoking status: Never Smoker   • Smokeless tobacco: Never Used   • Alcohol use No   • Drug use: No   • Sexual activity: Not on file     Other Topics Concern   • Not on file     Social History Narrative   • No narrative on file     Allergies   Allergen Reactions   • Penicillins Swelling     Outpatient Encounter Prescriptions as of 10/24/2018   Medication Sig Dispense Refill   • hydroCHLOROthiazide (HYDRODIURIL) 12.5 MG tablet Take 12.5 mg by mouth every day.     • carvedilol  "(COREG) 25 MG Tab Take 1 Tab by mouth 2 times a day, with meals. 180 Tab 3   • potassium citrate SR (UROCIT-K SR) 10 MEQ (1080 MG) Tab CR Take 2 Tabs by mouth 3 times a day. (Patient taking differently: Take 20 mEq by mouth 2 Times a Day.) 550 Tab 3   • enalapril (VASOTEC) 20 MG tablet Take 1 Tab by mouth 2 Times a Day.     • Insulin Glargine (BASAGLAR KWIKPEN) 100 UNIT/ML Solution Pen-injector 74 units     • BD PEN NEEDLE CASIE U/F 32G X 4 MM Misc      • pioglitazone (ACTOS) 30 MG Tab TK 1 T PO QD FOR 90 DAYS  0   • tamsulosin (FLOMAX) 0.4 MG capsule Take 0.4 mg by mouth every bedtime.     • furosemide (LASIX) 20 MG Tab Take 1 Tab by mouth 2 times a day. 60 Tab 11   • omega-3 acid ethyl esters (LOVAZA) 1 GM capsule Take 2 capsules twice daily. 360 Cap 3   • atorvastatin (LIPITOR) 40 MG Tab Take 1 Tab by mouth every evening. 90 Tab 2   • fenofibrate (TRICOR) 145 MG Tab Take 1 Tab by mouth every day. 90 Tab 3   • Dulaglutide (TRULICITY) 1.5 MG/0.5ML Solution Pen-injector Inject  as instructed every 7 days.     • finasteride (PROSCAR) 5 MG TABS Take 5 mg by mouth every day.     • aspirin 81 MG tablet Take 81 mg by mouth 2 times a day.     • glimepiride (AMARYL) 4 MG TABS Take 4 mg by mouth 2 Times a Day. 4 mg in AM 2 mg in PM     • Cholecalciferol (VITAMIN D) 2000 UNIT TABS Take 4,000 Units by mouth every day.     • TRULICITY 1.5 MG/0.5ML Solution Pen-injector INJECT ONE PREFILLED SYRINGE Q WEEK  1   • NON SPECIFIED PARTH LOPIC ACID 200MG DAILY        No facility-administered encounter medications on file as of 10/24/2018.      ROS     Objective:   /58 (BP Location: Left arm, Patient Position: Sitting, BP Cuff Size: Adult)   Pulse (!) 58   Ht 1.88 m (6' 2\")   Wt (!) 135.8 kg (299 lb 4.8 oz)   SpO2 94%   BMI 38.43 kg/m²     Physical Exam   Constitutional: He appears well-developed and well-nourished.   Neck: No JVD present.   Cardiovascular: Normal rate and regular rhythm.    No murmur heard.  Pulmonary/Chest: " Effort normal and breath sounds normal. He has no rales.   Abdominal: Soft. There is no tenderness.   Musculoskeletal: He exhibits edema (Trace pretibial).         Lab Results   Component Value Date/Time    CHOLSTRLTOT 125 01/24/2018 09:11 AM    LDL 58 01/24/2018 09:11 AM    HDL 26 (L) 01/24/2018 09:11 AM    TRIGLYCERIDE 203 (H) 01/24/2018 09:11 AM       Lab Results   Component Value Date/Time    SODIUM 147 (H) 05/01/2018 07:11 AM    SODIUM 137 08/15/2013 04:37 PM    POTASSIUM 4.5 05/01/2018 07:11 AM    POTASSIUM 4.1 08/15/2013 04:37 PM    CHLORIDE 108 (H) 05/01/2018 07:11 AM    CHLORIDE 106 08/15/2013 04:37 PM    CO2 25 05/01/2018 07:11 AM    CO2 24 08/15/2013 04:37 PM    GLUCOSE 104 (H) 05/01/2018 07:11 AM    GLUCOSE 236 (H) 08/15/2013 04:37 PM    BUN 26 05/01/2018 07:11 AM    BUN 18 08/15/2013 04:37 PM    CREATININE 1.80 (H) 05/01/2018 07:11 AM    CREATININE 1.35 08/15/2013 04:37 PM    BUNCREATRAT 14 05/01/2018 07:11 AM     Lab Results   Component Value Date/Time    ALKPHOSPHAT 32 (L) 01/24/2018 09:08 AM    ALKPHOSPHAT 55 08/15/2013 04:37 PM    ASTSGOT 9 01/24/2018 09:08 AM    ASTSGOT 14 08/15/2013 04:37 PM    ALTSGPT 17 01/24/2018 09:08 AM    ALTSGPT 25 08/15/2013 04:37 PM    TBILIRUBIN 0.3 01/24/2018 09:08 AM    TBILIRUBIN 0.9 08/15/2013 04:37 PM      No results found for: BNPBTYPENAT     Echocardiography Laboratory    CONCLUSIONS  No prior study is available for comparison.   Technically difficult but adequate study for interpretation.   Mild concentric left ventricular hypertrophy.  Normal left ventricular chamber size.  Normal left ventricular systolic function.  Left ventricular ejection fraction is visually estimated to be 65%.  Grossly normal regional wall motion.  Mild aortic insufficiency.  Mildly dilated left atrium.  No mitral stenosis or regurgitation.  Vena cava is not well visualized.  Estimated right ventricular systolic pressure  is 55-60 mmHg.  Right heart pressures are consistent with moderate  pulmonary   hypertension.      EZEQUIEL RODRIGUEZ  Exam Date:         04/30/2018         Assessment:     1. Elevated coronary artery calcium score: 250 in March 2015     2. Essential hypertension     3. Dyslipidemia         Medical Decision Making:  Today's Assessment / Status / Plan:     Mr. Rodriguez is clinically stable though his blood pressure is somewhat labile.  At this time, we will start low-dose Spironolactone and discontinue potassium supplementation.  He will have lab work in about 1 week and 3 weeks with follow-up in about a month.  His lipid status is under fairly good control.  He might be considered for Jardiance given his elevated cardiovascular risk.  We will leave this up to his endocrinologist.

## 2018-11-01 LAB
BUN SERPL-MCNC: 55 MG/DL (ref 8–27)
BUN/CREAT SERPL: 20 (ref 10–24)
CALCIUM SERPL-MCNC: 9.7 MG/DL (ref 8.6–10.2)
CHLORIDE SERPL-SCNC: 107 MMOL/L (ref 96–106)
CO2 SERPL-SCNC: 23 MMOL/L (ref 20–29)
CREAT SERPL-MCNC: 2.7 MG/DL (ref 0.76–1.27)
GLUCOSE SERPL-MCNC: 177 MG/DL (ref 65–99)
IF AFRICAN AMERICAN  100797: 27 ML/MIN/1.73
IF NON AFRICAN AMER 100791: 23 ML/MIN/1.73
POTASSIUM SERPL-SCNC: 4.9 MMOL/L (ref 3.5–5.2)
SODIUM SERPL-SCNC: 143 MMOL/L (ref 134–144)

## 2018-11-02 ENCOUNTER — TELEPHONE (OUTPATIENT)
Dept: CARDIOLOGY | Facility: MEDICAL CENTER | Age: 68
End: 2018-11-02

## 2018-11-02 DIAGNOSIS — N18.30 CKD (CHRONIC KIDNEY DISEASE) STAGE 3, GFR 30-59 ML/MIN (HCC): ICD-10-CM

## 2018-11-02 DIAGNOSIS — Z79.899 HIGH RISK MEDICATION USE: ICD-10-CM

## 2018-11-02 DIAGNOSIS — I10 ESSENTIAL HYPERTENSION: ICD-10-CM

## 2018-11-02 NOTE — TELEPHONE ENCOUNTER
Discussed recent labs and creatinine with RADHA    Stop spironolactone and repeat BMP prior to 11/21 f/u appt.    Pt called, informed as above. Pt states understanding and appreciative of call.

## 2018-11-19 ENCOUNTER — TELEPHONE (OUTPATIENT)
Dept: CARDIOLOGY | Facility: MEDICAL CENTER | Age: 68
End: 2018-11-19

## 2018-11-19 NOTE — TELEPHONE ENCOUNTER
S/w patient about non-fasting labs, [facility]     [facility] to [facility] before upcoming appt with Tran on 11/21/2018.

## 2018-11-20 LAB
BUN SERPL-MCNC: 32 MG/DL (ref 8–27)
BUN/CREAT SERPL: 15 (ref 10–24)
CALCIUM SERPL-MCNC: 9.2 MG/DL (ref 8.6–10.2)
CHLORIDE SERPL-SCNC: 107 MMOL/L (ref 96–106)
CO2 SERPL-SCNC: 21 MMOL/L (ref 20–29)
CREAT SERPL-MCNC: 2.15 MG/DL (ref 0.76–1.27)
GLUCOSE SERPL-MCNC: 101 MG/DL (ref 65–99)
IF AFRICAN AMERICAN  100797: 35 ML/MIN/1.73
IF NON AFRICAN AMER 100791: 30 ML/MIN/1.73
POTASSIUM SERPL-SCNC: 4.4 MMOL/L (ref 3.5–5.2)
SODIUM SERPL-SCNC: 142 MMOL/L (ref 134–144)

## 2018-11-21 ENCOUNTER — OFFICE VISIT (OUTPATIENT)
Dept: CARDIOLOGY | Facility: MEDICAL CENTER | Age: 68
End: 2018-11-21
Payer: MEDICARE

## 2018-11-21 VITALS
SYSTOLIC BLOOD PRESSURE: 128 MMHG | OXYGEN SATURATION: 94 % | DIASTOLIC BLOOD PRESSURE: 64 MMHG | BODY MASS INDEX: 39.04 KG/M2 | WEIGHT: 304.2 LBS | HEIGHT: 74 IN | HEART RATE: 65 BPM

## 2018-11-21 DIAGNOSIS — I10 ESSENTIAL HYPERTENSION: ICD-10-CM

## 2018-11-21 DIAGNOSIS — E11.9 TYPE 2 DIABETES MELLITUS WITHOUT COMPLICATION, WITHOUT LONG-TERM CURRENT USE OF INSULIN (HCC): ICD-10-CM

## 2018-11-21 DIAGNOSIS — E78.5 DYSLIPIDEMIA: ICD-10-CM

## 2018-11-21 DIAGNOSIS — N18.30 CKD (CHRONIC KIDNEY DISEASE) STAGE 3, GFR 30-59 ML/MIN (HCC): ICD-10-CM

## 2018-11-21 DIAGNOSIS — R93.1 ELEVATED CORONARY ARTERY CALCIUM SCORE: ICD-10-CM

## 2018-11-21 PROCEDURE — 99214 OFFICE O/P EST MOD 30 MIN: CPT | Performed by: NURSE PRACTITIONER

## 2018-11-21 RX ORDER — ATORVASTATIN CALCIUM 40 MG/1
40 TABLET, FILM COATED ORAL EVERY EVENING
Qty: 90 TAB | Refills: 2 | Status: SHIPPED | OUTPATIENT
Start: 2018-11-21 | End: 2019-07-12 | Stop reason: SDUPTHER

## 2018-11-21 ASSESSMENT — ENCOUNTER SYMPTOMS
ORTHOPNEA: 0
ABDOMINAL PAIN: 0
COUGH: 0
DIZZINESS: 0
PALPITATIONS: 0
SHORTNESS OF BREATH: 0
FEVER: 0
MYALGIAS: 0
CLAUDICATION: 0
PND: 0

## 2018-11-21 NOTE — PROGRESS NOTES
Chief Complaint   Patient presents with   • Coronary Artery Disease       Subjective:   Davie Rodriguez is a 68 y.o. male who presents today for follow-up on his hypertension, hyperlipidemia.    Patient of Dr. Pinto.  Patient was last seen in clinic on 10/24/2018.  During his last visit, patient was started on spironolactone 25 mg daily.  Patient had to stop that medication due to worsening kidney function.  Patient had a repeat BMP last week which showed some improvement of his kidney function but not back to his prior right baseline.    Patient reports he has not seen his kidney doctor in about 6 months.    For his symptoms, Patient feels well, denies chest pain, shortness of breath, palpitations, dizziness/lightheadedness, orthopnea, PND or Edema.     Patient reports his home blood pressures remained stable between 120-130/40s-50s.    Patient reports he is not exercising currently, but states he wants to try to lose weight.    Additonally, patient has the following medical problems:     -Hypertension:     -Hyperlipidemia: Taking atorvastatin 40 mg daily, fenofibrate, lovaza     -Obstructive sleep apnea: Using CPAP regularly and night oxygen     -Renal insufficiency/CKD: Patient is followed by nephrology, Dr. Williamson    -Diabetes: Using insulin, Trulicity, glimepiride, Pioglitazone, followed by endocrinology      Past Medical History:   Diagnosis Date   • Diabetes    • Hyperlipidemia    • Hypertension    • Renal disorder     STONES     Past Surgical History:   Procedure Laterality Date   • PERCUTANEOUS NEPHROSTOLITHOTOMY  6/23/2010    Performed by FOX GARCIA at SURGERY [facility] ORS   • RECOVERY  6/21/2010    Performed by SURGERY, IR-RECOVERY at SURGERY SAME DAY [facility] ORS   • OTHER  1995    GALL BLADDER REMOVED     Family History   Problem Relation Age of Onset   • Heart Attack Father 57   • Other Sister 72        diabetes   • Sleep Apnea Sister      Social History     Social History   •  Marital status:      Spouse name: N/A   • Number of children: N/A   • Years of education: N/A     Occupational History   • Not on file.     Social History Main Topics   • Smoking status: Never Smoker   • Smokeless tobacco: Never Used   • Alcohol use No   • Drug use: No   • Sexual activity: Not on file     Other Topics Concern   • Not on file     Social History Narrative   • No narrative on file     Allergies   Allergen Reactions   • Penicillins Swelling     Outpatient Encounter Prescriptions as of 11/21/2018   Medication Sig Dispense Refill   • hydroCHLOROthiazide (HYDRODIURIL) 12.5 MG tablet Take 12.5 mg by mouth every day.     • carvedilol (COREG) 25 MG Tab Take 1 Tab by mouth 2 times a day, with meals. 180 Tab 3   • enalapril (VASOTEC) 20 MG tablet Take 1 Tab by mouth 2 Times a Day.     • Insulin Glargine (BASAGLAR KWIKPEN) 100 UNIT/ML Solution Pen-injector 74 units     • BD PEN NEEDLE CASIE U/F 32G X 4 MM Misc      • pioglitazone (ACTOS) 30 MG Tab TK 1 T PO QD FOR 90 DAYS  0   • tamsulosin (FLOMAX) 0.4 MG capsule Take 0.4 mg by mouth every bedtime.     • furosemide (LASIX) 20 MG Tab Take 1 Tab by mouth 2 times a day. 60 Tab 11   • omega-3 acid ethyl esters (LOVAZA) 1 GM capsule Take 2 capsules twice daily. 360 Cap 3   • atorvastatin (LIPITOR) 40 MG Tab Take 1 Tab by mouth every evening. 90 Tab 2   • fenofibrate (TRICOR) 145 MG Tab Take 1 Tab by mouth every day. 90 Tab 3   • Dulaglutide (TRULICITY) 1.5 MG/0.5ML Solution Pen-injector Inject  as instructed every 7 days.     • finasteride (PROSCAR) 5 MG TABS Take 5 mg by mouth every day.     • aspirin 81 MG tablet Take 81 mg by mouth 2 times a day.     • glimepiride (AMARYL) 4 MG TABS Take 4 mg by mouth 2 Times a Day. 4 mg in AM 2 mg in PM     • Cholecalciferol (VITAMIN D) 2000 UNIT TABS Take 4,000 Units by mouth every day.     • TRULICITY 1.5 MG/0.5ML Solution Pen-injector INJECT ONE PREFILLED SYRINGE Q WEEK  1   • NON SPECIFIED PARTH LOPIC ACID 200MG DAILY     "    No facility-administered encounter medications on file as of 11/21/2018.      Review of Systems   Constitutional: Negative for fever and malaise/fatigue.   Respiratory: Negative for cough and shortness of breath.    Cardiovascular: Negative for chest pain, palpitations, orthopnea, claudication, leg swelling and PND.   Gastrointestinal: Negative for abdominal pain.   Musculoskeletal: Negative for myalgias.   Neurological: Negative for dizziness.   All other systems reviewed and are negative.       Objective:   /64 (BP Location: Left arm, Patient Position: Sitting, BP Cuff Size: Adult)   Pulse 65   Ht 1.88 m (6' 2\")   Wt (!) 138 kg (304 lb 3.2 oz)   SpO2 94%   BMI 39.06 kg/m²     Physical Exam   Constitutional: He is oriented to person, place, and time. He appears well-developed and well-nourished.   Obese, BMI of 39.06   HENT:   Head: Normocephalic and atraumatic.   Eyes: Pupils are equal, round, and reactive to light. EOM are normal.   Neck: Normal range of motion. Neck supple. No JVD present.   Cardiovascular: Normal rate, regular rhythm and normal heart sounds.    Pulmonary/Chest: Effort normal and breath sounds normal. No respiratory distress. He has no wheezes. He has no rales.   Abdominal: Soft. Bowel sounds are normal.   Rounded abdomen   Musculoskeletal: He exhibits edema (Trace bilateral lower extremity edema).   Neurological: He is alert and oriented to person, place, and time.   Skin: Skin is warm and dry.   Psychiatric: He has a normal mood and affect. His behavior is normal.   Vitals reviewed.    Lab Results   Component Value Date/Time    CHOLSTRLTOT 125 01/24/2018 09:11 AM    LDL 58 01/24/2018 09:11 AM    HDL 26 (L) 01/24/2018 09:11 AM    TRIGLYCERIDE 203 (H) 01/24/2018 09:11 AM       Lab Results   Component Value Date/Time    SODIUM 142 11/19/2018 06:54 AM    SODIUM 137 08/15/2013 04:37 PM    POTASSIUM 4.4 11/19/2018 06:54 AM    POTASSIUM 4.1 08/15/2013 04:37 PM    CHLORIDE 107 (H) " 11/19/2018 06:54 AM    CHLORIDE 106 08/15/2013 04:37 PM    CO2 21 11/19/2018 06:54 AM    CO2 24 08/15/2013 04:37 PM    GLUCOSE 101 (H) 11/19/2018 06:54 AM    GLUCOSE 236 (H) 08/15/2013 04:37 PM    BUN 32 (H) 11/19/2018 06:54 AM    BUN 18 08/15/2013 04:37 PM    CREATININE 2.15 (H) 11/19/2018 06:54 AM    CREATININE 1.35 08/15/2013 04:37 PM    BUNCREATRAT 15 11/19/2018 06:54 AM     Lab Results   Component Value Date/Time    ALKPHOSPHAT 32 (L) 01/24/2018 09:08 AM    ALKPHOSPHAT 55 08/15/2013 04:37 PM    ASTSGOT 9 01/24/2018 09:08 AM    ASTSGOT 14 08/15/2013 04:37 PM    ALTSGPT 17 01/24/2018 09:08 AM    ALTSGPT 25 08/15/2013 04:37 PM    TBILIRUBIN 0.3 01/24/2018 09:08 AM    TBILIRUBIN 0.9 08/15/2013 04:37 PM      Transthoracic Echo Report 4/30/2018  No prior study is available for comparison.   Technically difficult but adequate study for interpretation.   Mild concentric left ventricular hypertrophy.  Normal left ventricular chamber size.  Normal left ventricular systolic function.  Left ventricular ejection fraction is visually estimated to be 65%.  Grossly normal regional wall motion.  Mild aortic insufficiency.  Mildly dilated left atrium.  No mitral stenosis or regurgitation.  Vena cava is not well visualized.  Estimated right ventricular systolic pressure  is 55-60 mmHg.  Right heart pressures are consistent with moderate pulmonary   hypertension.    Assessment:     1. Essential hypertension  COMP METABOLIC PANEL    Lipid Profile   2. Dyslipidemia  COMP METABOLIC PANEL    Lipid Profile    atorvastatin (LIPITOR) 40 MG Tab   3. Elevated coronary artery calcium score: 250 in March 2015     4. CKD (chronic kidney disease) stage 3, GFR 30-59 ml/min ([facility])  COMP METABOLIC PANEL   5. Type 2 diabetes mellitus without complication, without long-term current use of insulin ([facility])         Medical Decision Making:  Today's Assessment / Status / Plan:   1.  Hypertension: Stable  -Continue carvedilol 25 mg twice a day  -Continue  enalapril 20 mg twice a day  -Continue hydrochlorothiazide 12.5 mg daily  -No spironolactone d/t CKD  -Repeat CMP/labs in 1 month    2.  Dyslipidemia/Elevated CAC: Last LDL of 58 on 1/24/2018  -Continue aspirin 81 mg daily  -Continue atorvastatin 40 mg daily  -Continue fenofibrate 145 mg daily  -Continue Lovaza 2 gm BID  -Repeat lipid panel in 1 month    3.  CKD: Creatinine at 2.15  -Encouraged patient to follow-up with nephrologist, patient to call and make an appointment    4.  Diabetes:  -Continue to follow with endocrinology  -Continue current medical regimen  -Discussed with patient that his diabetes could be affecting his kidneys    FU in clinic in 4-5 months with Dr. Sewell. Sooner if needed.    Patient verbalizes understanding and agrees with the plan of care.     Collaborating MD: Everton Sewell MD

## 2018-12-17 ENCOUNTER — TELEPHONE (OUTPATIENT)
Dept: CARDIOLOGY | Facility: MEDICAL CENTER | Age: 68
End: 2018-12-17

## 2018-12-18 NOTE — TELEPHONE ENCOUNTER
PAR sent to plan:  Davie Rodriguez (Valente: [id number])       Status   Sent to Samira   Next Steps   The plan will fax you a determination, typically within 1 to 5 business days.  How do I follow up?   DrugOmega-3-acid Ethyl Esters 1GM capsules   FormWellCare Medicare Prescription Drug Coverage Determination FormCoverage Determination Form for Prescription Drugs [phone]

## 2018-12-20 LAB
BUN SERPL-MCNC: 49 MG/DL (ref 8–27)
BUN/CREAT SERPL: 20 (ref 10–24)
CALCIUM SERPL-MCNC: 9.5 MG/DL (ref 8.6–10.2)
CHLORIDE SERPL-SCNC: 107 MMOL/L (ref 96–106)
CO2 SERPL-SCNC: 23 MMOL/L (ref 20–29)
CREAT SERPL-MCNC: 2.42 MG/DL (ref 0.76–1.27)
GLUCOSE SERPL-MCNC: 248 MG/DL (ref 65–99)
IF AFRICAN AMERICAN  100797: 30 ML/MIN/1.73
IF NON AFRICAN AMER 100791: 26 ML/MIN/1.73
POTASSIUM SERPL-SCNC: 5.1 MMOL/L (ref 3.5–5.2)
SODIUM SERPL-SCNC: 144 MMOL/L (ref 134–144)

## 2019-01-07 ENCOUNTER — TELEPHONE (OUTPATIENT)
Dept: CARDIOLOGY | Facility: MEDICAL CENTER | Age: 69
End: 2019-01-07

## 2019-01-07 NOTE — TELEPHONE ENCOUNTER
RE: Edit   Received: 3 days ago   Message Contents   Wesley eSwell M.D.  Sheridan Fabian R.N.             Have him try to get some omega-3 fatty acids over-the-counter.  Usually this is Fish oil.   [employer] does have a high level of omega-3 fish oil capsule.  We will try to get in 2 g twice daily of omega-3 fatty acids.    Previous Messages             Notice:     Scan on 12/31/2018  3:11 PM by Gillian Hsieh : 12/20/2018 - RX DESK (OMEGA 3 ACID ETHYL ESTERS DENIED     Pt called and discussed above. Pt states he will go to his local pharmacy to discuss and ask for assistance. 3/25 f/u appt discussed. Pt appreciative of call.

## 2019-01-11 ENCOUNTER — TELEPHONE (OUTPATIENT)
Dept: CARDIOLOGY | Facility: MEDICAL CENTER | Age: 69
End: 2019-01-11

## 2019-01-11 NOTE — TELEPHONE ENCOUNTER
"Questions   Received: Today   Message Contents   Danielle Rodriguez, Lorenzo Ass't  Sheridan Fabian R.N.   Phone Number: [phone]             NELSON Swartz,     Pt stated that he spoke w/a nurse regarding his alternatives to Omega 3 acid caps 1 mg. He stated that the person was talking so fast that he did not get to get it all down. He wanted a call back so he can write it down. He can be reached at: [phone].     Thank you so much,     Danielle      Returned patient call. Identified myself as the \"fast talker\". Repeated discussion from 1/7 phone call. Pt appreciative, pt states he grabbed a pencil this time and wrote everything down.   "

## 2019-04-23 ENCOUNTER — OFFICE VISIT (OUTPATIENT)
Dept: CARDIOLOGY | Facility: MEDICAL CENTER | Age: 69
End: 2019-04-23
Payer: MEDICARE

## 2019-04-23 ENCOUNTER — TELEPHONE (OUTPATIENT)
Dept: CARDIOLOGY | Facility: MEDICAL CENTER | Age: 69
End: 2019-04-23

## 2019-04-23 VITALS
WEIGHT: 285 LBS | SYSTOLIC BLOOD PRESSURE: 130 MMHG | OXYGEN SATURATION: 93 % | BODY MASS INDEX: 36.57 KG/M2 | HEIGHT: 74 IN | DIASTOLIC BLOOD PRESSURE: 60 MMHG | HEART RATE: 66 BPM

## 2019-04-23 DIAGNOSIS — E78.5 DYSLIPIDEMIA: ICD-10-CM

## 2019-04-23 DIAGNOSIS — R93.1 ELEVATED CORONARY ARTERY CALCIUM SCORE: ICD-10-CM

## 2019-04-23 DIAGNOSIS — I10 ESSENTIAL HYPERTENSION: ICD-10-CM

## 2019-04-23 DIAGNOSIS — N18.30 CKD (CHRONIC KIDNEY DISEASE) STAGE 3, GFR 30-59 ML/MIN (HCC): ICD-10-CM

## 2019-04-23 PROCEDURE — 99214 OFFICE O/P EST MOD 30 MIN: CPT | Performed by: INTERNAL MEDICINE

## 2019-04-23 NOTE — TELEPHONE ENCOUNTER
Spoke with pt did let him know that  order some labs and that I was going to mail them to them today 04/23/2019.

## 2019-04-23 NOTE — PROGRESS NOTES
Chief Complaint   Patient presents with   • Coronary Artery Disease     FV       Subjective:   Davie Rodriguez is a 68 y.o. male who presents today for followup of his hypertension and hyperlipidemia. He also has diabetes, obstructive sleep apnea and mild renal insufficiency.    At the time of his visit in October we started him on Spironolactone.  This was discontinued in November because of some increasing renal insufficiency.  He does have underlying chronic kidney disease.    His blood pressures at home of been running about 130/50-60.  He denies any chest discomfort, dyspnea, edema, palpitations or lightheadedness.    Past Medical History:   Diagnosis Date   • Diabetes    • Hyperlipidemia    • Hypertension    • Renal disorder     STONES     Past Surgical History:   Procedure Laterality Date   • PERCUTANEOUS NEPHROSTOLITHOTOMY  6/23/2010    Performed by FOX GARCIA at SURGERY [facility] ORS   • RECOVERY  6/21/2010    Performed by KENYATTA SCHWARTZ at SURGERY SAME DAY [facility] ORS   • OTHER  1995    GALL BLADDER REMOVED     Family History   Problem Relation Age of Onset   • Heart Attack Father 57   • Other Sister 72        diabetes   • Sleep Apnea Sister      Social History     Social History   • Marital status:      Spouse name: N/A   • Number of children: N/A   • Years of education: N/A     Occupational History   • Not on file.     Social History Main Topics   • Smoking status: Never Smoker   • Smokeless tobacco: Never Used   • Alcohol use No   • Drug use: No   • Sexual activity: Not on file     Other Topics Concern   • Not on file     Social History Narrative   • No narrative on file     Allergies   Allergen Reactions   • Penicillins Swelling     Outpatient Encounter Prescriptions as of 4/23/2019   Medication Sig Dispense Refill   • atorvastatin (LIPITOR) 40 MG Tab Take 1 Tab by mouth every evening. 90 Tab 2   • hydroCHLOROthiazide (HYDRODIURIL) 12.5 MG tablet Take 12.5 mg by mouth  "every day.     • carvedilol (COREG) 25 MG Tab Take 1 Tab by mouth 2 times a day, with meals. 180 Tab 3   • enalapril (VASOTEC) 20 MG tablet Take 1 Tab by mouth 2 Times a Day.     • Insulin Glargine (BASAGLAR KWIKPEN) 100 UNIT/ML Solution Pen-injector 74 units     • BD PEN NEEDLE CASIE U/F 32G X 4 MM Misc      • pioglitazone (ACTOS) 30 MG Tab TK 1 T PO QD FOR 90 DAYS  0   • tamsulosin (FLOMAX) 0.4 MG capsule Take 0.4 mg by mouth every bedtime.     • furosemide (LASIX) 20 MG Tab Take 1 Tab by mouth 2 times a day. 60 Tab 11   • omega-3 acid ethyl esters (LOVAZA) 1 GM capsule Take 2 capsules twice daily. 360 Cap 3   • fenofibrate (TRICOR) 145 MG Tab Take 1 Tab by mouth every day. 90 Tab 3   • Dulaglutide (TRULICITY) 1.5 MG/0.5ML Solution Pen-injector Inject  as instructed every 7 days.     • finasteride (PROSCAR) 5 MG TABS Take 5 mg by mouth every day.     • aspirin 81 MG tablet Take 81 mg by mouth 2 times a day.     • glimepiride (AMARYL) 4 MG TABS Take 4 mg by mouth 2 Times a Day. 4 mg in AM 2 mg in PM     • Cholecalciferol (VITAMIN D) 2000 UNIT TABS Take 4,000 Units by mouth every day.     • NON SPECIFIED PARTH LOPIC ACID 200MG DAILY        No facility-administered encounter medications on file as of 4/23/2019.      ROS     Objective:   /60 (BP Location: Left arm, Patient Position: Sitting, BP Cuff Size: Adult)   Pulse 66   Ht 1.88 m (6' 2\")   Wt (!) 129.3 kg (285 lb)   SpO2 93%   BMI 36.59 kg/m²     Physical Exam   Constitutional: He appears well-developed and well-nourished.   Neck: No JVD present.   Cardiovascular: Normal rate and regular rhythm.    No murmur heard.  Pulmonary/Chest: Effort normal and breath sounds normal. He has no rales.   Abdominal: Soft. There is no tenderness.   Musculoskeletal: He exhibits no edema.     Lab Results   Component Value Date/Time    CHOLSTRLTOT 125 01/24/2018 09:11 AM    LDL 58 01/24/2018 09:11 AM    HDL 26 (L) 01/24/2018 09:11 AM    TRIGLYCERIDE 203 (H) 01/24/2018 " 09:11 AM       Lab Results   Component Value Date/Time    SODIUM 144 12/19/2018 06:34 AM    SODIUM 137 08/15/2013 04:37 PM    POTASSIUM 5.1 12/19/2018 06:34 AM    POTASSIUM 4.1 08/15/2013 04:37 PM    CHLORIDE 107 (H) 12/19/2018 06:34 AM    CHLORIDE 106 08/15/2013 04:37 PM    CO2 23 12/19/2018 06:34 AM    CO2 24 08/15/2013 04:37 PM    GLUCOSE 248 (H) 12/19/2018 06:34 AM    GLUCOSE 236 (H) 08/15/2013 04:37 PM    BUN 49 (H) 12/19/2018 06:34 AM    BUN 18 08/15/2013 04:37 PM    CREATININE 2.42 (H) 12/19/2018 06:34 AM    CREATININE 1.35 08/15/2013 04:37 PM    BUNCREATRAT 20 12/19/2018 06:34 AM     Lab Results   Component Value Date/Time    ALKPHOSPHAT 32 (L) 01/24/2018 09:08 AM    ALKPHOSPHAT 55 08/15/2013 04:37 PM    ASTSGOT 9 01/24/2018 09:08 AM    ASTSGOT 14 08/15/2013 04:37 PM    ALTSGPT 17 01/24/2018 09:08 AM    ALTSGPT 25 08/15/2013 04:37 PM    TBILIRUBIN 0.3 01/24/2018 09:08 AM    TBILIRUBIN 0.9 08/15/2013 04:37 PM      No results found for: BNPBTYPENAT       Assessment:     1. Elevated coronary artery calcium score: 250 in March 2015     2. Essential hypertension     3. CKD (chronic kidney disease) stage 3, GFR 30-59 ml/min (HCC)     4. Dyslipidemia         Medical Decision Making:  Today's Assessment / Status / Plan:     Mr. Rodriguez is clinically stable.  He is asymptomatic from a cardiovascular standpoint.  His blood pressure appears to be under good control and he is on both low-dose furosemide and hydrochlorothiazide.  At this time, we will discontinue HCTZ and he will follow his blood pressures.  In addition, he feels the has had his lipids checked within the last few months.  We will try to obtain that lab work for review.  Will then decide whether not we need to modify his statin therapy.  He will follow-up in about 6 months.

## 2019-04-23 NOTE — LETTER
[facility]   [address] [place] [address]  [place] [address]  Phone: [phone]  Fax: [phone]              Davie Rodriguez  1950    Encounter Date: 4/23/2019    Wesley Sewell M.D.          PROGRESS NOTE:  Chief Complaint   Patient presents with   • Coronary Artery Disease     FV       Subjective:   Davie Rodriguez is a 68 y.o. male who presents today for followup of his hypertension and hyperlipidemia. He also has diabetes, obstructive sleep apnea and mild renal insufficiency.    At the time of his visit in October we started him on Spironolactone.  This was discontinued in November because of some increasing renal insufficiency.  He does have underlying chronic kidney disease.    His blood pressures at home of been running about 130/50-60.  He denies any chest discomfort, dyspnea, edema, palpitations or lightheadedness.    Past Medical History:   Diagnosis Date   • Diabetes    • Hyperlipidemia    • Hypertension    • Renal disorder     STONES     Past Surgical History:   Procedure Laterality Date   • PERCUTANEOUS NEPHROSTOLITHOTOMY  6/23/2010    Performed by FOX GARCIA at SURGERY [facility] ORS   • RECOVERY  6/21/2010    Performed by SURGERY, IR-RECOVERY at SURGERY SAME DAY [facility] ORS   • OTHER  1995    GALL BLADDER REMOVED     Family History   Problem Relation Age of Onset   • Heart Attack Father 57   • Other Sister 72        diabetes   • Sleep Apnea Sister      Social History     Social History   • Marital status:      Spouse name: N/A   • Number of children: N/A   • Years of education: N/A     Occupational History   • Not on file.     Social History Main Topics   • Smoking status: Never Smoker   • Smokeless tobacco: Never Used   • Alcohol use No   • Drug use: No   • Sexual activity: Not on file     Other Topics Concern   • Not on file     Social History Narrative   • No narrative on file     Allergies   Allergen  "Reactions   • Penicillins Swelling     Outpatient Encounter Prescriptions as of 4/23/2019   Medication Sig Dispense Refill   • atorvastatin (LIPITOR) 40 MG Tab Take 1 Tab by mouth every evening. 90 Tab 2   • hydroCHLOROthiazide (HYDRODIURIL) 12.5 MG tablet Take 12.5 mg by mouth every day.     • carvedilol (COREG) 25 MG Tab Take 1 Tab by mouth 2 times a day, with meals. 180 Tab 3   • enalapril (VASOTEC) 20 MG tablet Take 1 Tab by mouth 2 Times a Day.     • Insulin Glargine (BASAGLAR KWIKPEN) 100 UNIT/ML Solution Pen-injector 74 units     • BD PEN NEEDLE CASIE U/F 32G X 4 MM Misc      • pioglitazone (ACTOS) 30 MG Tab TK 1 T PO QD FOR 90 DAYS  0   • tamsulosin (FLOMAX) 0.4 MG capsule Take 0.4 mg by mouth every bedtime.     • furosemide (LASIX) 20 MG Tab Take 1 Tab by mouth 2 times a day. 60 Tab 11   • omega-3 acid ethyl esters (LOVAZA) 1 GM capsule Take 2 capsules twice daily. 360 Cap 3   • fenofibrate (TRICOR) 145 MG Tab Take 1 Tab by mouth every day. 90 Tab 3   • Dulaglutide (TRULICITY) 1.5 MG/0.5ML Solution Pen-injector Inject  as instructed every 7 days.     • finasteride (PROSCAR) 5 MG TABS Take 5 mg by mouth every day.     • aspirin 81 MG tablet Take 81 mg by mouth 2 times a day.     • glimepiride (AMARYL) 4 MG TABS Take 4 mg by mouth 2 Times a Day. 4 mg in AM 2 mg in PM     • Cholecalciferol (VITAMIN D) 2000 UNIT TABS Take 4,000 Units by mouth every day.     • NON SPECIFIED PARTH LOPIC ACID 200MG DAILY        No facility-administered encounter medications on file as of 4/23/2019.      ROS     Objective:   /60 (BP Location: Left arm, Patient Position: Sitting, BP Cuff Size: Adult)   Pulse 66   Ht 1.88 m (6' 2\")   Wt (!) 129.3 kg (285 lb)   SpO2 93%   BMI 36.59 kg/m²      Physical Exam   Constitutional: He appears well-developed and well-nourished.   Neck: No JVD present.   Cardiovascular: Normal rate and regular rhythm.    No murmur heard.  Pulmonary/Chest: Effort normal and breath sounds normal. He has " no rales.   Abdominal: Soft. There is no tenderness.   Musculoskeletal: He exhibits no edema.     Lab Results   Component Value Date/Time    CHOLSTRLTOT 125 01/24/2018 09:11 AM    LDL 58 01/24/2018 09:11 AM    HDL 26 (L) 01/24/2018 09:11 AM    TRIGLYCERIDE 203 (H) 01/24/2018 09:11 AM       Lab Results   Component Value Date/Time    SODIUM 144 12/19/2018 06:34 AM    SODIUM 137 08/15/2013 04:37 PM    POTASSIUM 5.1 12/19/2018 06:34 AM    POTASSIUM 4.1 08/15/2013 04:37 PM    CHLORIDE 107 (H) 12/19/2018 06:34 AM    CHLORIDE 106 08/15/2013 04:37 PM    CO2 23 12/19/2018 06:34 AM    CO2 24 08/15/2013 04:37 PM    GLUCOSE 248 (H) 12/19/2018 06:34 AM    GLUCOSE 236 (H) 08/15/2013 04:37 PM    BUN 49 (H) 12/19/2018 06:34 AM    BUN 18 08/15/2013 04:37 PM    CREATININE 2.42 (H) 12/19/2018 06:34 AM    CREATININE 1.35 08/15/2013 04:37 PM    BUNCREATRAT 20 12/19/2018 06:34 AM     Lab Results   Component Value Date/Time    ALKPHOSPHAT 32 (L) 01/24/2018 09:08 AM    ALKPHOSPHAT 55 08/15/2013 04:37 PM    ASTSGOT 9 01/24/2018 09:08 AM    ASTSGOT 14 08/15/2013 04:37 PM    ALTSGPT 17 01/24/2018 09:08 AM    ALTSGPT 25 08/15/2013 04:37 PM    TBILIRUBIN 0.3 01/24/2018 09:08 AM    TBILIRUBIN 0.9 08/15/2013 04:37 PM      No results found for: BNPBTYPENAT       Assessment:     1. Elevated coronary artery calcium score: 250 in March 2015     2. Essential hypertension     3. CKD (chronic kidney disease) stage 3, GFR 30-59 ml/min (HCC)     4. Dyslipidemia         Medical Decision Making:  Today's Assessment / Status / Plan:     Mr. Rodriguez is clinically stable.  He is asymptomatic from a cardiovascular standpoint.  His blood pressure appears to be under good control and he is on both low-dose furosemide and hydrochlorothiazide.  At this time, we will discontinue HCTZ and he will follow his blood pressures.  In addition, he feels the has had his lipids checked within the last few months.  We will try to obtain that lab work for review.  Will then  decide whether not we need to modify his statin therapy.  He will follow-up in about 6 months.      CYDNEY Cabral.  [address] [place] [id number]  VIA Facsimile: [phone]

## 2019-05-15 LAB
ALBUMIN SERPL-MCNC: 4.2 G/DL (ref 3.5–4.8)
ALBUMIN/GLOB SERPL: 1.8 {RATIO} (ref 1.2–2.2)
ALP SERPL-CCNC: 41 IU/L (ref 39–117)
ALT SERPL-CCNC: 20 IU/L (ref 0–44)
AST SERPL-CCNC: 12 IU/L (ref 0–40)
BILIRUB SERPL-MCNC: 0.5 MG/DL (ref 0–1.2)
BUN SERPL-MCNC: 41 MG/DL (ref 8–27)
BUN/CREAT SERPL: 19 (ref 10–24)
CALCIUM SERPL-MCNC: 9.8 MG/DL (ref 8.6–10.2)
CHLORIDE SERPL-SCNC: 109 MMOL/L (ref 96–106)
CO2 SERPL-SCNC: 21 MMOL/L (ref 20–29)
CREAT SERPL-MCNC: 2.17 MG/DL (ref 0.76–1.27)
GLOBULIN SER CALC-MCNC: 2.3 G/DL (ref 1.5–4.5)
GLUCOSE SERPL-MCNC: 199 MG/DL (ref 65–99)
POTASSIUM SERPL-SCNC: 4.8 MMOL/L (ref 3.5–5.2)
PROT SERPL-MCNC: 6.5 G/DL (ref 6–8.5)
SODIUM SERPL-SCNC: 143 MMOL/L (ref 134–144)

## 2019-07-12 DIAGNOSIS — E78.5 DYSLIPIDEMIA: ICD-10-CM

## 2019-07-12 RX ORDER — ATORVASTATIN CALCIUM 40 MG/1
40 TABLET, FILM COATED ORAL EVERY EVENING
Qty: 90 TAB | Refills: 1 | Status: ON HOLD | OUTPATIENT
Start: 2019-07-12 | End: 2019-09-03

## 2019-08-13 DIAGNOSIS — E78.5 HYPERLIPIDEMIA, UNSPECIFIED HYPERLIPIDEMIA TYPE: ICD-10-CM

## 2019-08-14 RX ORDER — OMEGA-3-ACID ETHYL ESTERS 1 G/1
CAPSULE, LIQUID FILLED ORAL
Qty: 360 CAP | Refills: 3 | Status: SHIPPED | OUTPATIENT
Start: 2019-08-14 | End: 2020-02-01

## 2019-08-26 ENCOUNTER — HOSPITAL ENCOUNTER (OUTPATIENT)
Dept: RADIOLOGY | Facility: MEDICAL CENTER | Age: 69
End: 2019-08-26
Attending: NEUROLOGICAL SURGERY
Payer: MEDICARE

## 2019-08-26 DIAGNOSIS — Z01.812 PRE-OPERATIVE LABORATORY EXAMINATION: ICD-10-CM

## 2019-08-26 DIAGNOSIS — Z01.811 PRE-OPERATIVE RESPIRATORY EXAMINATION: ICD-10-CM

## 2019-08-26 DIAGNOSIS — Z01.810 PRE-OPERATIVE CARDIOVASCULAR EXAMINATION: ICD-10-CM

## 2019-08-26 LAB
ANION GAP SERPL CALC-SCNC: 9 MMOL/L (ref 0–11.9)
APTT PPP: 28.8 SEC (ref 24.7–36)
BASOPHILS # BLD AUTO: 0.6 % (ref 0–1.8)
BASOPHILS # BLD: 0.03 K/UL (ref 0–0.12)
BUN SERPL-MCNC: 42 MG/DL (ref 8–22)
CALCIUM SERPL-MCNC: 9.2 MG/DL (ref 8.5–10.5)
CHLORIDE SERPL-SCNC: 112 MMOL/L (ref 96–112)
CO2 SERPL-SCNC: 22 MMOL/L (ref 20–33)
CREAT SERPL-MCNC: 2.09 MG/DL (ref 0.5–1.4)
EKG IMPRESSION: NORMAL
EOSINOPHIL # BLD AUTO: 0.28 K/UL (ref 0–0.51)
EOSINOPHIL NFR BLD: 5.7 % (ref 0–6.9)
ERYTHROCYTE [DISTWIDTH] IN BLOOD BY AUTOMATED COUNT: 49.7 FL (ref 35.9–50)
GLUCOSE SERPL-MCNC: 201 MG/DL (ref 65–99)
HCT VFR BLD AUTO: 37.7 % (ref 42–52)
HGB BLD-MCNC: 12.3 G/DL (ref 14–18)
IMM GRANULOCYTES # BLD AUTO: 0.04 K/UL (ref 0–0.11)
IMM GRANULOCYTES NFR BLD AUTO: 0.8 % (ref 0–0.9)
INR PPP: 1.08 (ref 0.87–1.13)
LYMPHOCYTES # BLD AUTO: 1 K/UL (ref 1–4.8)
LYMPHOCYTES NFR BLD: 20.5 % (ref 22–41)
MCH RBC QN AUTO: 30 PG (ref 27–33)
MCHC RBC AUTO-ENTMCNC: 32.6 G/DL (ref 33.7–35.3)
MCV RBC AUTO: 92 FL (ref 81.4–97.8)
MONOCYTES # BLD AUTO: 0.43 K/UL (ref 0–0.85)
MONOCYTES NFR BLD AUTO: 8.8 % (ref 0–13.4)
NEUTROPHILS # BLD AUTO: 3.1 K/UL (ref 1.82–7.42)
NEUTROPHILS NFR BLD: 63.6 % (ref 44–72)
NRBC # BLD AUTO: 0 K/UL
NRBC BLD-RTO: 0 /100 WBC
PLATELET # BLD AUTO: 196 K/UL (ref 164–446)
PMV BLD AUTO: 10.5 FL (ref 9–12.9)
POTASSIUM SERPL-SCNC: 4.7 MMOL/L (ref 3.6–5.5)
PROTHROMBIN TIME: 14.2 SEC (ref 12–14.6)
RBC # BLD AUTO: 4.1 M/UL (ref 4.7–6.1)
SODIUM SERPL-SCNC: 143 MMOL/L (ref 135–145)
WBC # BLD AUTO: 4.9 K/UL (ref 4.8–10.8)

## 2019-08-26 PROCEDURE — 85025 COMPLETE CBC W/AUTO DIFF WBC: CPT

## 2019-08-26 PROCEDURE — 93005 ELECTROCARDIOGRAM TRACING: CPT | Performed by: NEUROLOGICAL SURGERY

## 2019-08-26 PROCEDURE — 93010 ELECTROCARDIOGRAM REPORT: CPT | Performed by: INTERNAL MEDICINE

## 2019-08-26 PROCEDURE — 80048 BASIC METABOLIC PNL TOTAL CA: CPT

## 2019-08-26 PROCEDURE — 85730 THROMBOPLASTIN TIME PARTIAL: CPT

## 2019-08-26 PROCEDURE — 71046 X-RAY EXAM CHEST 2 VIEWS: CPT

## 2019-08-26 PROCEDURE — 36415 COLL VENOUS BLD VENIPUNCTURE: CPT

## 2019-08-26 PROCEDURE — 85610 PROTHROMBIN TIME: CPT

## 2019-09-03 ENCOUNTER — APPOINTMENT (OUTPATIENT)
Dept: RADIOLOGY | Facility: MEDICAL CENTER | Age: 69
End: 2019-09-03
Attending: NEUROLOGICAL SURGERY
Payer: MEDICARE

## 2019-09-03 ENCOUNTER — ANESTHESIA EVENT (OUTPATIENT)
Dept: SURGERY | Facility: MEDICAL CENTER | Age: 69
End: 2019-09-03
Payer: MEDICARE

## 2019-09-03 ENCOUNTER — HOSPITAL ENCOUNTER (OUTPATIENT)
Facility: MEDICAL CENTER | Age: 69
End: 2019-09-03
Attending: NEUROLOGICAL SURGERY | Admitting: NEUROLOGICAL SURGERY
Payer: MEDICARE

## 2019-09-03 ENCOUNTER — ANESTHESIA (OUTPATIENT)
Dept: SURGERY | Facility: MEDICAL CENTER | Age: 69
End: 2019-09-03
Payer: MEDICARE

## 2019-09-03 VITALS
HEIGHT: 74 IN | OXYGEN SATURATION: 96 % | BODY MASS INDEX: 36.33 KG/M2 | RESPIRATION RATE: 17 BRPM | HEART RATE: 59 BPM | DIASTOLIC BLOOD PRESSURE: 68 MMHG | TEMPERATURE: 97.6 F | WEIGHT: 283.07 LBS | SYSTOLIC BLOOD PRESSURE: 162 MMHG

## 2019-09-03 DIAGNOSIS — G89.18 POST-OP PAIN: ICD-10-CM

## 2019-09-03 LAB — GLUCOSE BLD-MCNC: 381 MG/DL (ref 65–99)

## 2019-09-03 PROCEDURE — 160035 HCHG PACU - 1ST 60 MINS PHASE I: Performed by: NEUROLOGICAL SURGERY

## 2019-09-03 PROCEDURE — 500002 HCHG ADHESIVE, DERMABOND: Performed by: NEUROLOGICAL SURGERY

## 2019-09-03 PROCEDURE — 500440 HCHG DRESSING, STERILE ROLL (KERLIX): Performed by: NEUROLOGICAL SURGERY

## 2019-09-03 PROCEDURE — 82962 GLUCOSE BLOOD TEST: CPT

## 2019-09-03 PROCEDURE — 700105 HCHG RX REV CODE 258: Performed by: ANESTHESIOLOGY

## 2019-09-03 PROCEDURE — 700102 HCHG RX REV CODE 250 W/ 637 OVERRIDE(OP): Performed by: PHYSICIAN ASSISTANT

## 2019-09-03 PROCEDURE — A9270 NON-COVERED ITEM OR SERVICE: HCPCS | Performed by: PHYSICIAN ASSISTANT

## 2019-09-03 PROCEDURE — 700105 HCHG RX REV CODE 258: Performed by: NEUROLOGICAL SURGERY

## 2019-09-03 PROCEDURE — 160046 HCHG PACU - 1ST 60 MINS PHASE II: Performed by: NEUROLOGICAL SURGERY

## 2019-09-03 PROCEDURE — 160029 HCHG SURGERY MINUTES - 1ST 30 MINS LEVEL 4: Performed by: NEUROLOGICAL SURGERY

## 2019-09-03 PROCEDURE — 700101 HCHG RX REV CODE 250: Performed by: ANESTHESIOLOGY

## 2019-09-03 PROCEDURE — 72020 X-RAY EXAM OF SPINE 1 VIEW: CPT

## 2019-09-03 PROCEDURE — 160002 HCHG RECOVERY MINUTES (STAT): Performed by: NEUROLOGICAL SURGERY

## 2019-09-03 PROCEDURE — 700106 HCHG RX REV CODE 271: Performed by: NEUROLOGICAL SURGERY

## 2019-09-03 PROCEDURE — A9270 NON-COVERED ITEM OR SERVICE: HCPCS | Performed by: ANESTHESIOLOGY

## 2019-09-03 PROCEDURE — 160009 HCHG ANES TIME/MIN: Performed by: NEUROLOGICAL SURGERY

## 2019-09-03 PROCEDURE — 700102 HCHG RX REV CODE 250 W/ 637 OVERRIDE(OP): Performed by: ANESTHESIOLOGY

## 2019-09-03 PROCEDURE — 160036 HCHG PACU - EA ADDL 30 MINS PHASE I: Performed by: NEUROLOGICAL SURGERY

## 2019-09-03 PROCEDURE — 501838 HCHG SUTURE GENERAL: Performed by: NEUROLOGICAL SURGERY

## 2019-09-03 PROCEDURE — 160041 HCHG SURGERY MINUTES - EA ADDL 1 MIN LEVEL 4: Performed by: NEUROLOGICAL SURGERY

## 2019-09-03 PROCEDURE — 700111 HCHG RX REV CODE 636 W/ 250 OVERRIDE (IP)

## 2019-09-03 PROCEDURE — 700111 HCHG RX REV CODE 636 W/ 250 OVERRIDE (IP): Performed by: ANESTHESIOLOGY

## 2019-09-03 PROCEDURE — 700101 HCHG RX REV CODE 250: Performed by: NEUROLOGICAL SURGERY

## 2019-09-03 PROCEDURE — 160048 HCHG OR STATISTICAL LEVEL 1-5: Performed by: NEUROLOGICAL SURGERY

## 2019-09-03 PROCEDURE — 160025 RECOVERY II MINUTES (STATS): Performed by: NEUROLOGICAL SURGERY

## 2019-09-03 RX ORDER — HYDROCODONE BITARTRATE AND ACETAMINOPHEN 5; 325 MG/1; MG/1
1-2 TABLET ORAL EVERY 4 HOURS PRN
Status: DISCONTINUED | OUTPATIENT
Start: 2019-09-03 | End: 2019-09-03 | Stop reason: HOSPADM

## 2019-09-03 RX ORDER — HYDROMORPHONE HYDROCHLORIDE 1 MG/ML
0.4 INJECTION, SOLUTION INTRAMUSCULAR; INTRAVENOUS; SUBCUTANEOUS
Status: DISCONTINUED | OUTPATIENT
Start: 2019-09-03 | End: 2019-09-03 | Stop reason: HOSPADM

## 2019-09-03 RX ORDER — BUPIVACAINE HYDROCHLORIDE AND EPINEPHRINE 5; 5 MG/ML; UG/ML
INJECTION, SOLUTION EPIDURAL; INTRACAUDAL; PERINEURAL
Status: DISCONTINUED | OUTPATIENT
Start: 2019-09-03 | End: 2019-09-03 | Stop reason: HOSPADM

## 2019-09-03 RX ORDER — HYDROCODONE BITARTRATE AND ACETAMINOPHEN 5; 325 MG/1; MG/1
1-2 TABLET ORAL EVERY 4 HOURS PRN
Qty: 56 TAB | Refills: 0 | Status: SHIPPED | OUTPATIENT
Start: 2019-09-03 | End: 2019-09-10

## 2019-09-03 RX ORDER — OXYCODONE HCL 5 MG/5 ML
5 SOLUTION, ORAL ORAL
Status: DISCONTINUED | OUTPATIENT
Start: 2019-09-03 | End: 2019-09-03 | Stop reason: HOSPADM

## 2019-09-03 RX ORDER — GABAPENTIN 300 MG/1
300 CAPSULE ORAL ONCE
Status: COMPLETED | OUTPATIENT
Start: 2019-09-03 | End: 2019-09-03

## 2019-09-03 RX ORDER — ONDANSETRON 2 MG/ML
4 INJECTION INTRAMUSCULAR; INTRAVENOUS
Status: DISCONTINUED | OUTPATIENT
Start: 2019-09-03 | End: 2019-09-03 | Stop reason: HOSPADM

## 2019-09-03 RX ORDER — DIPHENHYDRAMINE HYDROCHLORIDE 50 MG/ML
12.5 INJECTION INTRAMUSCULAR; INTRAVENOUS
Status: DISCONTINUED | OUTPATIENT
Start: 2019-09-03 | End: 2019-09-03 | Stop reason: HOSPADM

## 2019-09-03 RX ORDER — SODIUM CHLORIDE, SODIUM LACTATE, POTASSIUM CHLORIDE, AND CALCIUM CHLORIDE .6; .31; .03; .02 G/100ML; G/100ML; G/100ML; G/100ML
IRRIGANT IRRIGATION
Status: DISCONTINUED | OUTPATIENT
Start: 2019-09-03 | End: 2019-09-03 | Stop reason: HOSPADM

## 2019-09-03 RX ORDER — LIDOCAINE HYDROCHLORIDE 20 MG/ML
INJECTION, SOLUTION EPIDURAL; INFILTRATION; INTRACAUDAL; PERINEURAL PRN
Status: DISCONTINUED | OUTPATIENT
Start: 2019-09-03 | End: 2019-09-03 | Stop reason: SURG

## 2019-09-03 RX ORDER — OXYCODONE HCL 5 MG/5 ML
10 SOLUTION, ORAL ORAL
Status: DISCONTINUED | OUTPATIENT
Start: 2019-09-03 | End: 2019-09-03 | Stop reason: HOSPADM

## 2019-09-03 RX ORDER — SUCCINYLCHOLINE CHLORIDE 20 MG/ML
INJECTION INTRAMUSCULAR; INTRAVENOUS PRN
Status: DISCONTINUED | OUTPATIENT
Start: 2019-09-03 | End: 2019-09-03 | Stop reason: SURG

## 2019-09-03 RX ORDER — ROCURONIUM BROMIDE 10 MG/ML
INJECTION, SOLUTION INTRAVENOUS PRN
Status: DISCONTINUED | OUTPATIENT
Start: 2019-09-03 | End: 2019-09-03 | Stop reason: SURG

## 2019-09-03 RX ORDER — TIZANIDINE 4 MG/1
4 TABLET ORAL EVERY 6 HOURS PRN
Qty: 56 TAB | Refills: 3 | Status: SHIPPED | OUTPATIENT
Start: 2019-09-03 | End: 2019-09-17

## 2019-09-03 RX ORDER — HALOPERIDOL 5 MG/ML
1 INJECTION INTRAMUSCULAR
Status: DISCONTINUED | OUTPATIENT
Start: 2019-09-03 | End: 2019-09-03 | Stop reason: HOSPADM

## 2019-09-03 RX ORDER — ACETAMINOPHEN 500 MG
1000 TABLET ORAL ONCE
Status: COMPLETED | OUTPATIENT
Start: 2019-09-03 | End: 2019-09-03

## 2019-09-03 RX ORDER — SODIUM CHLORIDE, SODIUM LACTATE, POTASSIUM CHLORIDE, CALCIUM CHLORIDE 600; 310; 30; 20 MG/100ML; MG/100ML; MG/100ML; MG/100ML
INJECTION, SOLUTION INTRAVENOUS CONTINUOUS
Status: DISCONTINUED | OUTPATIENT
Start: 2019-09-03 | End: 2019-09-03 | Stop reason: HOSPADM

## 2019-09-03 RX ORDER — CEFAZOLIN SODIUM 1 G/3ML
INJECTION, POWDER, FOR SOLUTION INTRAMUSCULAR; INTRAVENOUS PRN
Status: DISCONTINUED | OUTPATIENT
Start: 2019-09-03 | End: 2019-09-03 | Stop reason: SURG

## 2019-09-03 RX ORDER — CELECOXIB 200 MG/1
200 CAPSULE ORAL ONCE
Status: COMPLETED | OUTPATIENT
Start: 2019-09-03 | End: 2019-09-03

## 2019-09-03 RX ORDER — DEXAMETHASONE SODIUM PHOSPHATE 4 MG/ML
INJECTION, SOLUTION INTRA-ARTICULAR; INTRALESIONAL; INTRAMUSCULAR; INTRAVENOUS; SOFT TISSUE PRN
Status: DISCONTINUED | OUTPATIENT
Start: 2019-09-03 | End: 2019-09-03 | Stop reason: SURG

## 2019-09-03 RX ORDER — BACITRACIN 50000 [IU]/1
INJECTION, POWDER, FOR SOLUTION INTRAMUSCULAR
Status: DISCONTINUED | OUTPATIENT
Start: 2019-09-03 | End: 2019-09-03 | Stop reason: HOSPADM

## 2019-09-03 RX ORDER — LABETALOL HYDROCHLORIDE 5 MG/ML
5 INJECTION, SOLUTION INTRAVENOUS
Status: DISCONTINUED | OUTPATIENT
Start: 2019-09-03 | End: 2019-09-03 | Stop reason: HOSPADM

## 2019-09-03 RX ORDER — ONDANSETRON 2 MG/ML
INJECTION INTRAMUSCULAR; INTRAVENOUS PRN
Status: DISCONTINUED | OUTPATIENT
Start: 2019-09-03 | End: 2019-09-03 | Stop reason: SURG

## 2019-09-03 RX ORDER — HYDROMORPHONE HYDROCHLORIDE 1 MG/ML
0.1 INJECTION, SOLUTION INTRAMUSCULAR; INTRAVENOUS; SUBCUTANEOUS
Status: DISCONTINUED | OUTPATIENT
Start: 2019-09-03 | End: 2019-09-03 | Stop reason: HOSPADM

## 2019-09-03 RX ORDER — LIDOCAINE HYDROCHLORIDE 10 MG/ML
INJECTION, SOLUTION EPIDURAL; INFILTRATION; INTRACAUDAL; PERINEURAL
Status: COMPLETED
Start: 2019-09-03 | End: 2019-09-03

## 2019-09-03 RX ORDER — HYDROMORPHONE HYDROCHLORIDE 1 MG/ML
0.2 INJECTION, SOLUTION INTRAMUSCULAR; INTRAVENOUS; SUBCUTANEOUS
Status: DISCONTINUED | OUTPATIENT
Start: 2019-09-03 | End: 2019-09-03 | Stop reason: HOSPADM

## 2019-09-03 RX ADMIN — DEXAMETHASONE SODIUM PHOSPHATE 4 MG: 4 INJECTION, SOLUTION INTRA-ARTICULAR; INTRALESIONAL; INTRAMUSCULAR; INTRAVENOUS; SOFT TISSUE at 07:32

## 2019-09-03 RX ADMIN — ROCURONIUM BROMIDE 50 MG: 10 INJECTION, SOLUTION INTRAVENOUS at 07:32

## 2019-09-03 RX ADMIN — FENTANYL CITRATE 150 MCG: 50 INJECTION, SOLUTION INTRAMUSCULAR; INTRAVENOUS at 07:46

## 2019-09-03 RX ADMIN — HYDROCODONE BITARTRATE AND ACETAMINOPHEN 2 TABLET: 5; 325 TABLET ORAL at 11:10

## 2019-09-03 RX ADMIN — CELECOXIB 200 MG: 200 CAPSULE ORAL at 06:54

## 2019-09-03 RX ADMIN — SODIUM CHLORIDE, POTASSIUM CHLORIDE, SODIUM LACTATE AND CALCIUM CHLORIDE: 600; 310; 30; 20 INJECTION, SOLUTION INTRAVENOUS at 06:41

## 2019-09-03 RX ADMIN — LIDOCAINE HYDROCHLORIDE 5 ML: 20 INJECTION, SOLUTION EPIDURAL; INFILTRATION; INTRACAUDAL at 07:32

## 2019-09-03 RX ADMIN — PROPOFOL 50 MCG/KG/MIN: 10 INJECTION, EMULSION INTRAVENOUS at 07:34

## 2019-09-03 RX ADMIN — SUCCINYLCHOLINE CHLORIDE 200 MG: 20 INJECTION, SOLUTION INTRAMUSCULAR; INTRAVENOUS at 07:32

## 2019-09-03 RX ADMIN — KETAMINE HYDROCHLORIDE 5 MCG/KG/MIN: 50 INJECTION, SOLUTION INTRAMUSCULAR; INTRAVENOUS at 07:34

## 2019-09-03 RX ADMIN — PROPOFOL 200 MG: 10 INJECTION, EMULSION INTRAVENOUS at 07:32

## 2019-09-03 RX ADMIN — LIDOCAINE HYDROCHLORIDE 0.5 ML: 10 INJECTION, SOLUTION EPIDURAL; INFILTRATION; INTRACAUDAL at 06:41

## 2019-09-03 RX ADMIN — ONDANSETRON 4 MG: 2 INJECTION INTRAMUSCULAR; INTRAVENOUS at 07:32

## 2019-09-03 RX ADMIN — FENTANYL CITRATE 100 MCG: 50 INJECTION, SOLUTION INTRAMUSCULAR; INTRAVENOUS at 07:32

## 2019-09-03 RX ADMIN — GABAPENTIN 300 MG: 300 CAPSULE ORAL at 06:54

## 2019-09-03 RX ADMIN — ACETAMINOPHEN 1000 MG: 500 TABLET ORAL at 06:53

## 2019-09-03 RX ADMIN — SUFENTANIL CITRATE 50 MCG: 50 INJECTION EPIDURAL; INTRAVENOUS at 07:34

## 2019-09-03 RX ADMIN — CEFAZOLIN 3 G: 330 INJECTION, POWDER, FOR SOLUTION INTRAMUSCULAR; INTRAVENOUS at 07:28

## 2019-09-03 RX ADMIN — Medication 0.5 ML: at 06:41

## 2019-09-03 RX ADMIN — EPHEDRINE SULFATE 25 MG: 50 INJECTION, SOLUTION INTRAVENOUS at 08:27

## 2019-09-03 ASSESSMENT — PAIN SCALES - GENERAL: PAIN_LEVEL: 1

## 2019-09-03 NOTE — ANESTHESIA PREPROCEDURE EVALUATION
Relevant Problems   ANESTHESIA   (+) SHAKA (obstructive sleep apnea)      PULMONARY (within normal limits)      NEURO   (+) History of obstructive sleep apnea: History of surgery in approximately 2001      CARDIAC   (+) Elevated coronary artery calcium score: 250 in March 2015   (+) Essential hypertension      GI (within normal limits)         (+) CKD (chronic kidney disease) stage 3, GFR 30-59 ml/min (HCC)      ENDO   (+) Type 2 diabetes mellitus without complication (HCC)       Physical Exam    Airway   Mallampati: II  TM distance: >3 FB  Neck ROM: full       Cardiovascular - normal exam  Rhythm: regular  Rate: normal  (-) murmur     Dental - normal exam         Pulmonary - normal exam  Breath sounds clear to auscultation     Abdominal    Neurological - normal exam                 Anesthesia Plan    ASA 3       Plan - general       Airway plan will be ETT        Induction: intravenous      Pertinent diagnostic labs and testing reviewed    Informed Consent:    Anesthetic plan and risks discussed with patient.    Use of blood products discussed with: patient whom.

## 2019-09-03 NOTE — ANESTHESIA TIME REPORT
Anesthesia Start and Stop Event Times     Date Time Event    9/3/2019 0648 Ready for Procedure     0728 Anesthesia Start     0924 Anesthesia Stop        Responsible Staff  09/03/19    Name Role Begin End    Meir Dickerson D.O. Anesth 0728 0924        Preop Diagnosis (Free Text):  Pre-op Diagnosis     LUMBAR RADICULOPATHY        Preop Diagnosis (Codes):    Post op Diagnosis  Lumbar radiculopathy  lower back pain    Premium Reason  Non-Premium    Comments:

## 2019-09-03 NOTE — OP REPORT
DATE OF SERVICE:  09/03/2019    SURGEON:  Meir Moss MD    PHYSICIAN ASSISTANT:  Brook Florez PA-C    PREOPERATIVE DIAGNOSES:   1.  L4 right-sided radiculopathy.  2.  Right L4-L5 facet hypertrophy with neural foraminal stenosis and lateral   recess stenosis.    COMPLICATIONS:  None.    BLOOD LOSS:  Less than 100 mL.    IMPLANTS:  None.     PROCEDURE:  L4-L5 hemilaminectomy, L4-L5 medial facetectomy and L4-L5   foraminotomy for decompression of nerve root.    BRIEF HISTORY OF PRESENT ILLNESS:  The patient is a 68-year-old gentleman who   presented with intractable L4 radicular pain that had been conservatively   managed with intraforaminal injections multiple times.  He typically got 2   years of relief from his injections.  He has had a total of 3.  His last one   worked for approximately 1-2 weeks and therefore, he was referred for surgical   evaluation for decompression.  On evaluation of his MRI, he had severe facet   hypertrophy, particularly at the L4-L5 level on the right, leading to neural   foraminal stenosis and nerve root compression.  He was deemed an appropriate   candidate for an attempt at L4-L5 hemilaminectomy, medial facetectomy for   decompression of his foramen as well as lateral recess stenosis.    Consent was obtained from the patient apprising him the risks, complications,   indications of surgery, which included, but not limited to, CSF leak,   infection, hematoma and need for more surgery.  They agreed and consented.    PROCEDURE IN DETAIL:  The patient was brought in to the operating room.  He   was placed under general anesthesia with endotracheal intubation.  He was then   placed prone on a [place] frame with his arms outstretched.  He was then   prepped and draped in a sterile usual fashion for a lumbar decompression.    Fluoroscopy was brought in to identify the appropriate level.  Once the   appropriate level was identified, we then incised his dermis with a 10 blade   surgical  scalpel after infiltrating with lidocaine with epinephrine and then   used the Bovie along with self-retaining retractor to cut down the level of   the lumbosacral fascia.  Once this had been identified, we palpated spinous   process and then dissected in the subperiosteal plane on the spinous process   of the predetermined identified level.  Once we had dissected down to the   level of the pars, we then placed a Millie on the interlaminar fascia and   obtained a shot.  At the time of the shot, we were 1 level off.  We were at   the L5-S1 level and therefore, we continued our dissection on the spinous   process of L4 and dissected again in the periosteal plane on the right side of   the spinous process alone out over the lamina onto the pars.  At this point,   we did dissect down onto the hypertrophied facet and then being careful not to   violate the facet capsule.  We used a Fowler to dissect the soft tissue off the   facet capsule and then placed a Nevaeh retractor into the wound to hold the   soft tissue lateral.  Once the Nevaeh retractor was in place, we placed a   Millie on the interlaminar space again and took another shot to confirm that we   were at the L4-L5 level on the right side.  Once this had been confirmed, we   then used the high speed drill to drill off the superior aspect of the L5   lamina, with almost complete resection of the L4 lamina as well as the   undermined the spinous process as well.  In doing so, we were able to see the   ligamentum flavum in its entirety.  Then, we could carefully dissect into the   ligamentum flavum using a curette.  Once this had been completed, we were able   to use a Kerrison punch to punch to bite away the ligamentum flavum as well   as the residual eggshell lamina of L5 and L4.  Once the lamina had been bitten   away completely, we then began removing the bone of the lateral recess   ensuring that we did not take more than half of the pars, so that we would not    cause instability.  Once we had bit away the majority of the lateral recess,   we then used a ball feeler to reach out the neural foramen as well as feeling   the lateral recess itself to ensure that the nerve root at the L4 level had   been completely disimpacted we could feel the pedicle.  We pushed the ball tip   feeler out the foramen with no resistance.  We did feel a small amount of   resistance at the pedicle of 5, at the superior facet of L5 and therefore, we   bit more of this away and undermined as much as possible to complete a   foraminotomy.  Once this had been completed, we then packed the wound with   Surgifoam, tamped this in place using a 1x1 anh and then we irrigated the   wound thoroughly with bacitracin irrigation, checked for appropriate   hemostasis and bipolared where necessary and then sutured the wound closed in   layers with 0 Vicryls in the thoracolumbar fascia, 2-0 Vicryls in the dermis   and then a running subQ Monocryl, followed by Dermabond as a skin stitch and   dressing.  All counts were correct x2.  Dr. Moss was present for the entirety   of the case.  Brook Florez, my PA, was necessary for the surgery for both   assistance with dissection as well as with assistance for retracting the dura   and ensuring that the patient's nerve root was disimpacted.      All counts were correct x2 and the patient was extubated and taken to PACU   without incident.       ____________________________________     MD QUANG James / DARION    DD:  09/03/2019 09:55:36  DT:  09/03/2019 10:30:56    D#:  [id number]  Job#:  [id number]

## 2019-09-03 NOTE — ANESTHESIA POSTPROCEDURE EVALUATION
Patient: Davie Rodriguez    Procedure Summary     Date:  09/03/19 Room / Location:  [facility] 05 / SURGERY [facility]    Anesthesia Start:  0728 Anesthesia Stop:  0924    Procedures:       LAMINECTOMY, SPINE, LUMBAR, WITH DISCECTOMY - L4-5 HEMILAMINECTOMY W/RIGHT MEDIAL FACETECTOMY (Right Back)      FORAMINOTOMY, SPINE - L4-5 (Right Back) Diagnosis:  (LUMBAR RADICULOPATHY)    Surgeon:  Meir Moss M.D. Responsible Provider:  Meir Dickerson D.O.    Anesthesia Type:  general ASA Status:  3          Final Anesthesia Type: general  Last vitals  BP   NIBP: (!) 188/64    Temp   36.7 °C (98.1 °F)    Pulse   Pulse: (!) 53   Resp   18    SpO2   95 %      Anesthesia Post Evaluation    Patient location during evaluation: PACU  Patient participation: waiting for patient participation  Level of consciousness: obtunded/minimal responses  Pain score: 1    Airway patency: patent  Anesthetic complications: no  Cardiovascular status: hemodynamically stable  Respiratory status: acceptable  Hydration status: euvolemic    PONV: none           Nurse Pain Score: 5 (NPRS)

## 2019-09-03 NOTE — ANESTHESIA PROCEDURE NOTES
Airway  Date/Time: 9/3/2019 7:49 AM  Performed by: Meir Dickerson D.O.  Authorized by: Meir Dickerson D.O.     Location:  OR  Urgency:  Elective  Indications for Airway Management:  Anesthesia  Spontaneous Ventilation: absent    Sedation Level:  Deep  Preoxygenated: Yes    Patient Position:  Sniffing  Final Airway Type:  Endotracheal airway  Final Endotracheal Airway:  ETT  Cuffed: Yes    Technique Used for Successful ETT Placement:  Direct laryngoscopy  Insertion Site:  Oral  Blade Type:  Matilda  Laryngoscope Blade/Videolaryngoscope Blade Size:  3  ETT Size (mm):  8.0  Measured from:  Gums  ETT to Gums (cm):  21  Placement Verified by: auscultation and capnometry    Cormack-Lehane Classification:  Grade IIa - partial view of glottis  Number of Attempts at Approach:  1

## 2019-09-03 NOTE — DISCHARGE INSTRUCTIONS
ACTIVITY: Rest and take it easy for the first 24 hours.  A responsible adult is recommended to remain with you during that time.  It is normal to feel sleepy.  We encourage you to not do anything that requires balance, judgment or coordination.    MILD FLU-LIKE SYMPTOMS ARE NORMAL. YOU MAY EXPERIENCE GENERALIZED MUSCLE ACHES, THROAT IRRITATION, HEADACHE AND/OR SOME NAUSEA.    FOR 24 HOURS DO NOT:  Drive, operate machinery or run household appliances.  Drink beer or alcoholic beverages.   Make important decisions or sign legal documents.    SPECIAL INSTRUCTIONS:  Do not drive while taking narcotic pain medications.  Follow up at [facility] Neurosurgery office in 2 weeks. Call with questions or concerns    DIET: To avoid nausea, slowly advance diet as tolerated, avoiding spicy or greasy foods for the first day.  Add more substantial food to your diet according to your physician's instructions. INCREASE FLUIDS AND FIBER TO AVOID CONSTIPATION.    SURGICAL DRESSING/BATHING: Leave incision open to air, dermabond glue will flake off in 1-2 weeks No soaking in hot tubs, baths, pools, etc. OK to  shower    FOLLOW-UP APPOINTMENT:  A follow-up appointment should be arranged with your doctor in 2 weeks; call to schedule.    You should CALL YOUR PHYSICIAN if you develop:  Fever greater than 101 degrees F.  Pain not relieved by medication, or persistent nausea or vomiting.  Excessive bleeding (blood soaking through dressing) or unexpected drainage from the wound.  Extreme redness or swelling around the incision site, drainage of pus or foul smelling drainage.  Inability to urinate or empty your bladder within 8 hours.  Problems with breathing or chest pain.    You should call 911 if you develop problems with breathing or chest pain.  If you are unable to contact your doctor or surgical center, you should go to the nearest emergency room or urgent care center.  Physician's telephone #: [phone]    If any questions arise, call  your doctor.  If your doctor is not available, please feel free to call the Surgical Center at [phone].  The Center is open Monday through Friday from 7AM to 7PM.  You can also call the HEALTH HOTLINE open 24 hours/day, 7 days/week and speak to a nurse at [phone], or toll free at [phone].    A registered nurse may call you a few days after your surgery to see how you are doing after your procedure.    MEDICATIONS: Resume taking daily medication.  Take prescribed pain medication with food.  If no medication is prescribed, you may take non-aspirin pain medication if needed.  PAIN MEDICATION CAN BE VERY CONSTIPATING.  Take a stool softener or laxative such as senokot, pericolace, or milk of magnesia if needed.    Prescription given for Norco (pain) and Zanaflex (muscle relaxer).  Last pain medication given at 11:10 am.    If your physician has prescribed pain medication that includes Acetaminophen (Tylenol), do not take additional Acetaminophen (Tylenol) while taking the prescribed medication.    Depression / Suicide Risk    As you are discharged from this [facility] facility, it is important to learn how to keep safe from harming yourself.    Recognize the warning signs:  · Abrupt changes in personality, positive or negative- including increase in energy   · Giving away possessions  · Change in eating patterns- significant weight changes-  positive or negative  · Change in sleeping patterns- unable to sleep or sleeping all the time   · Unwillingness or inability to communicate  · Depression  · Unusual sadness, discouragement and loneliness  · Talk of wanting to die  · Neglect of personal appearance   · Rebelliousness- reckless behavior  · Withdrawal from people/activities they love  · Confusion- inability to concentrate     If you or a loved one observes any of these behaviors or has concerns about self-harm, here's what you can do:  · Talk about it- your feelings and reasons for harming  yourself  · Remove any means that you might use to hurt yourself (examples: pills, rope, extension cords, firearm)  · Get professional help from the community (Mental Health, Substance Abuse, psychological counseling)  · Do not be alone:Call your Safe Contact- someone whom you trust who will be there for you.  · Call your local CRISIS HOTLINE 450-6139 or 518-750-4352  · Call your local Children's Mobile Crisis Response Team [place] (617) 801-7654 or www.[employer]  · Call the toll free National Suicide Prevention Hotlines   · National Suicide Prevention Lifeline 343-338-FJBG (5577)  · National Hope Line Network 800-SUICIDE (262-8293)

## 2019-09-03 NOTE — PROGRESS NOTES
1137- Patient arrived in PACU II alert and oriented. Vital signs are within normal limits for the patient. Patient reports pain 3/10. Incision is clean, dry and intact. Discussed discharge plan of care and patient expressed understanding. All needs met at this time.    1155- Patient ambulated to the restroom and voided successfully.    1200- Patient feels ready to be discharged and meets discharge criteria set by Dr. Moss. Patient is going to get dressed. Unable to get a hold of patient's son, Seferino, at this time. As he is the patient's ride home, patient is going to rest and relax. All needs met at this time.    1220- Provided patient with discharge education with son, Seferino, at bedside. All questions answered.    1234- PIV removed and patient discharged home via wheelchair.

## 2019-09-03 NOTE — DISCHARGE SUMMARY
Discharge Summary    CHIEF COMPLAINT ON ADMISSION  No chief complaint on file.      Reason for Admission  LUMBAR RADICULOPATHY     Admission Date  9/3/2019    CODE STATUS  Prior    HPI & HOSPITAL COURSE  This is a 68 y.o. male here with lumbar radiculopathy. Surgery proceeded as planned. Pt recovered as expected & will be discharged on the same day as surgery if meeting criteria.        Therefore, he is discharged in good and stable condition to home with close outpatient follow-up.    The patient recovered much more quickly than anticipated on admission.    Discharge Date  9/3/19    FOLLOW UP ITEMS POST DISCHARGE  Keep scheduled apt in Dr Moss's office    DISCHARGE DIAGNOSES  Active Problems:    * No active hospital problems. *  Resolved Problems:    * No resolved hospital problems. *      FOLLOW UP  Future Appointments   Date Time Provider Department Center   10/24/2019 10:00 AM VINICIO Pathak None     No follow-up provider specified.    MEDICATIONS ON DISCHARGE     Medication List      ASK your doctor about these medications      Instructions   aspirin 81 MG tablet   Take 81 mg by mouth 2 times a day.  Dose:  81 mg     carvedilol 25 MG Tabs  Commonly known as:  COREG   Take 1 Tab by mouth 2 times a day, with meals.  Dose:  25 mg     enalapril 20 MG tablet  Commonly known as:  VASOTEC   Take 1 Tab by mouth 2 Times a Day.  Dose:  1 Tab     fenofibrate 145 MG Tabs  Commonly known as:  TRICOR   Take 1 Tab by mouth every day.  Dose:  145 mg     finasteride 5 MG Tabs  Commonly known as:  PROSCAR   Take 5 mg by mouth every day.  Dose:  5 mg     glimepiride 4 MG Tabs  Commonly known as:  AMARYL   Take 2-4 mg by mouth 2 Times a Day. 4 mg in AM  2 mg in PM  Dose:  2-4 mg     insulin glargine 100 UNIT/ML injection PEN   52-74 Units 2 times a day. 74 units AM, 52 units PM  Dose:  52-74 Units     omega-3 acid ethyl esters 1 GM capsule  Commonly known as:  LOVAZA   TAKE 2 CAPSULES BY MOUTH TWICE DAILY      tamsulosin 0.4 MG capsule  Commonly known as:  FLOMAX   Take 0.4 mg by mouth every bedtime.  Dose:  0.4 mg     TRULICITY 1.5 MG/0.5ML Sopn  Generic drug:  Dulaglutide   Inject 1.5 mg as instructed every 7 days.  Dose:  1.5 mg          Norco 5/325mg #56 tabs, 7day  Tizanidine #56 14 day supply    Allergies  Allergies   Allergen Reactions   • Penicillins Swelling     Turn red  With spots       DIET  No orders of the defined types were placed in this encounter.      ACTIVITY  Light duty.  Weight bearing as tolerated    CONSULTATIONS       PROCEDURES  Left L4-5 medial facetectomy with Dr Moss on 9/3/19    LABORATORY  Lab Results   Component Value Date    SODIUM 143 08/26/2019    POTASSIUM 4.7 08/26/2019    CHLORIDE 112 08/26/2019    CO2 22 08/26/2019    GLUCOSE 201 (H) 08/26/2019    BUN 42 (H) 08/26/2019    CREATININE 2.09 (H) 08/26/2019        Lab Results   Component Value Date    WBC 4.9 08/26/2019    HEMOGLOBIN 12.3 (L) 08/26/2019    HEMATOCRIT 37.7 (L) 08/26/2019    PLATELETCT 196 08/26/2019        Total time of the discharge process exceeds 15 minutes.

## 2019-10-16 ENCOUNTER — TELEPHONE (OUTPATIENT)
Dept: CARDIOLOGY | Facility: MEDICAL CENTER | Age: 69
End: 2019-10-16

## 2019-10-16 NOTE — TELEPHONE ENCOUNTER
[facility] called. Pts potassium citrate was denied by [facility].    Refused Medication Requests         Potassium Citrate 10 MEQ (1080 MG) TAKE 2 TABLETS BY MOUTH THREE TIMES DAILY       Pt takes Urocit to prevent kidney stones. Pt called. Pt encouraged to f/u with Urology. Pt is aggitated with news that it was denied. Pt feels his urologist is here on this floor with our providers. Pt seems to be confused re: providers and locations. Pt given location and phone number of [facility] [place] to assist him in processing this prescription refill.

## 2019-10-16 NOTE — TELEPHONE ENCOUNTER
FK    Hello, patient is calling because he went to his pharmacy and one of his prescriptions was cancelled and he would like to know why, he said he needed those medications for a lifetime theyre to prevent kidney stones.

## 2020-02-01 ENCOUNTER — HOSPITAL ENCOUNTER (INPATIENT)
Facility: MEDICAL CENTER | Age: 70
LOS: 6 days | DRG: 637 | End: 2020-02-07
Attending: EMERGENCY MEDICINE | Admitting: HOSPITALIST
Payer: MEDICARE

## 2020-02-01 ENCOUNTER — APPOINTMENT (OUTPATIENT)
Dept: RADIOLOGY | Facility: MEDICAL CENTER | Age: 70
DRG: 637 | End: 2020-02-01
Attending: EMERGENCY MEDICINE
Payer: MEDICARE

## 2020-02-01 DIAGNOSIS — R41.82 ALTERED MENTAL STATUS, UNSPECIFIED ALTERED MENTAL STATUS TYPE: ICD-10-CM

## 2020-02-01 DIAGNOSIS — R73.9 HYPERGLYCEMIA: ICD-10-CM

## 2020-02-01 DIAGNOSIS — R41.89 COGNITIVE CHANGE: ICD-10-CM

## 2020-02-01 PROBLEM — E11.01 HYPEROSMOLAR COMA (HCC): Status: ACTIVE | Noted: 2020-02-01

## 2020-02-01 PROBLEM — G93.41 ACUTE METABOLIC ENCEPHALOPATHY: Status: ACTIVE | Noted: 2020-02-01

## 2020-02-01 LAB
ALBUMIN SERPL BCP-MCNC: 3.4 G/DL (ref 3.2–4.9)
ALBUMIN/GLOB SERPL: 1.1 G/DL
ALP SERPL-CCNC: 77 U/L (ref 30–99)
ALT SERPL-CCNC: 13 U/L (ref 2–50)
AMMONIA PLAS-SCNC: 28 UMOL/L (ref 11–45)
AMPHET UR QL SCN: NEGATIVE
ANION GAP SERPL CALC-SCNC: 13 MMOL/L (ref 0–11.9)
ANION GAP SERPL CALC-SCNC: 5 MMOL/L (ref 0–11.9)
ANION GAP SERPL CALC-SCNC: 8 MMOL/L (ref 0–11.9)
APPEARANCE UR: CLEAR
AST SERPL-CCNC: 11 U/L (ref 12–45)
BACTERIA #/AREA URNS HPF: NEGATIVE /HPF
BARBITURATES UR QL SCN: NEGATIVE
BASOPHILS # BLD AUTO: 0.8 % (ref 0–1.8)
BASOPHILS # BLD: 0.05 K/UL (ref 0–0.12)
BENZODIAZ UR QL SCN: NEGATIVE
BILIRUB SERPL-MCNC: 1.1 MG/DL (ref 0.1–1.5)
BILIRUB UR QL STRIP.AUTO: NEGATIVE
BUN SERPL-MCNC: 34 MG/DL (ref 8–22)
BUN SERPL-MCNC: 38 MG/DL (ref 8–22)
BUN SERPL-MCNC: 48 MG/DL (ref 8–22)
BZE UR QL SCN: NEGATIVE
CALCIUM SERPL-MCNC: 8.9 MG/DL (ref 8.5–10.5)
CALCIUM SERPL-MCNC: 9.2 MG/DL (ref 8.5–10.5)
CALCIUM SERPL-MCNC: 9.7 MG/DL (ref 8.5–10.5)
CANNABINOIDS UR QL SCN: NEGATIVE
CHLORIDE SERPL-SCNC: 108 MMOL/L (ref 96–112)
CHLORIDE SERPL-SCNC: 109 MMOL/L (ref 96–112)
CHLORIDE SERPL-SCNC: 95 MMOL/L (ref 96–112)
CO2 SERPL-SCNC: 21 MMOL/L (ref 20–33)
CO2 SERPL-SCNC: 24 MMOL/L (ref 20–33)
CO2 SERPL-SCNC: 24 MMOL/L (ref 20–33)
COLOR UR: YELLOW
CREAT SERPL-MCNC: 1.65 MG/DL (ref 0.5–1.4)
CREAT SERPL-MCNC: 1.65 MG/DL (ref 0.5–1.4)
CREAT SERPL-MCNC: 2.02 MG/DL (ref 0.5–1.4)
EKG IMPRESSION: NORMAL
EOSINOPHIL # BLD AUTO: 0.13 K/UL (ref 0–0.51)
EOSINOPHIL NFR BLD: 2 % (ref 0–6.9)
EPI CELLS #/AREA URNS HPF: NEGATIVE /HPF
ERYTHROCYTE [DISTWIDTH] IN BLOOD BY AUTOMATED COUNT: 43.8 FL (ref 35.9–50)
GLOBULIN SER CALC-MCNC: 3 G/DL (ref 1.9–3.5)
GLUCOSE BLD-MCNC: 138 MG/DL (ref 65–99)
GLUCOSE BLD-MCNC: 183 MG/DL (ref 65–99)
GLUCOSE BLD-MCNC: 200 MG/DL (ref 65–99)
GLUCOSE BLD-MCNC: 207 MG/DL (ref 65–99)
GLUCOSE BLD-MCNC: 510 MG/DL (ref 65–99)
GLUCOSE BLD-MCNC: 513 MG/DL (ref 65–99)
GLUCOSE SERPL-MCNC: 147 MG/DL (ref 65–99)
GLUCOSE SERPL-MCNC: 330 MG/DL (ref 65–99)
GLUCOSE SERPL-MCNC: 855 MG/DL (ref 65–99)
GLUCOSE UR STRIP.AUTO-MCNC: >=1000 MG/DL
HCT VFR BLD AUTO: 37.3 % (ref 42–52)
HGB BLD-MCNC: 12.8 G/DL (ref 14–18)
HYALINE CASTS #/AREA URNS LPF: ABNORMAL /LPF
IMM GRANULOCYTES # BLD AUTO: 0.09 K/UL (ref 0–0.11)
IMM GRANULOCYTES NFR BLD AUTO: 1.4 % (ref 0–0.9)
KETONES UR STRIP.AUTO-MCNC: NEGATIVE MG/DL
LEUKOCYTE ESTERASE UR QL STRIP.AUTO: NEGATIVE
LYMPHOCYTES # BLD AUTO: 0.75 K/UL (ref 1–4.8)
LYMPHOCYTES NFR BLD: 11.4 % (ref 22–41)
MAGNESIUM SERPL-MCNC: 2 MG/DL (ref 1.5–2.5)
MCH RBC QN AUTO: 30.1 PG (ref 27–33)
MCHC RBC AUTO-ENTMCNC: 34.3 G/DL (ref 33.7–35.3)
MCV RBC AUTO: 87.8 FL (ref 81.4–97.8)
METHADONE UR QL SCN: NEGATIVE
MICRO URNS: ABNORMAL
MONOCYTES # BLD AUTO: 0.47 K/UL (ref 0–0.85)
MONOCYTES NFR BLD AUTO: 7.2 % (ref 0–13.4)
NEUTROPHILS # BLD AUTO: 5.07 K/UL (ref 1.82–7.42)
NEUTROPHILS NFR BLD: 77.2 % (ref 44–72)
NITRITE UR QL STRIP.AUTO: NEGATIVE
NRBC # BLD AUTO: 0 K/UL
NRBC BLD-RTO: 0 /100 WBC
OPIATES UR QL SCN: NEGATIVE
OXYCODONE UR QL SCN: POSITIVE
PCP UR QL SCN: NEGATIVE
PH UR STRIP.AUTO: 5 [PH] (ref 5–8)
PLATELET # BLD AUTO: 227 K/UL (ref 164–446)
PMV BLD AUTO: 10.3 FL (ref 9–12.9)
POTASSIUM SERPL-SCNC: 3.6 MMOL/L (ref 3.6–5.5)
POTASSIUM SERPL-SCNC: 3.7 MMOL/L (ref 3.6–5.5)
POTASSIUM SERPL-SCNC: 4.8 MMOL/L (ref 3.6–5.5)
PROPOXYPH UR QL SCN: NEGATIVE
PROT SERPL-MCNC: 6.4 G/DL (ref 6–8.2)
PROT UR QL STRIP: 300 MG/DL
RBC # BLD AUTO: 4.25 M/UL (ref 4.7–6.1)
RBC # URNS HPF: ABNORMAL /HPF
RBC UR QL AUTO: ABNORMAL
SODIUM SERPL-SCNC: 129 MMOL/L (ref 135–145)
SODIUM SERPL-SCNC: 137 MMOL/L (ref 135–145)
SODIUM SERPL-SCNC: 141 MMOL/L (ref 135–145)
SP GR UR STRIP.AUTO: 1.04
UROBILINOGEN UR STRIP.AUTO-MCNC: 0.2 MG/DL
WBC # BLD AUTO: 6.6 K/UL (ref 4.8–10.8)
WBC #/AREA URNS HPF: ABNORMAL /HPF

## 2020-02-01 PROCEDURE — 96374 THER/PROPH/DIAG INJ IV PUSH: CPT

## 2020-02-01 PROCEDURE — 700102 HCHG RX REV CODE 250 W/ 637 OVERRIDE(OP): Performed by: HOSPITALIST

## 2020-02-01 PROCEDURE — 770022 HCHG ROOM/CARE - ICU (200)

## 2020-02-01 PROCEDURE — 93010 ELECTROCARDIOGRAM REPORT: CPT | Performed by: INTERNAL MEDICINE

## 2020-02-01 PROCEDURE — 700111 HCHG RX REV CODE 636 W/ 250 OVERRIDE (IP): Performed by: EMERGENCY MEDICINE

## 2020-02-01 PROCEDURE — 80307 DRUG TEST PRSMV CHEM ANLYZR: CPT

## 2020-02-01 PROCEDURE — 82140 ASSAY OF AMMONIA: CPT

## 2020-02-01 PROCEDURE — 99291 CRITICAL CARE FIRST HOUR: CPT

## 2020-02-01 PROCEDURE — 99291 CRITICAL CARE FIRST HOUR: CPT | Performed by: INTERNAL MEDICINE

## 2020-02-01 PROCEDURE — 82962 GLUCOSE BLOOD TEST: CPT | Mod: 91

## 2020-02-01 PROCEDURE — 80048 BASIC METABOLIC PNL TOTAL CA: CPT | Mod: 91

## 2020-02-01 PROCEDURE — 83735 ASSAY OF MAGNESIUM: CPT

## 2020-02-01 PROCEDURE — 700102 HCHG RX REV CODE 250 W/ 637 OVERRIDE(OP): Performed by: EMERGENCY MEDICINE

## 2020-02-01 PROCEDURE — 70450 CT HEAD/BRAIN W/O DYE: CPT

## 2020-02-01 PROCEDURE — 96376 TX/PRO/DX INJ SAME DRUG ADON: CPT

## 2020-02-01 PROCEDURE — 700111 HCHG RX REV CODE 636 W/ 250 OVERRIDE (IP): Performed by: HOSPITALIST

## 2020-02-01 PROCEDURE — 700101 HCHG RX REV CODE 250: Performed by: INTERNAL MEDICINE

## 2020-02-01 PROCEDURE — 700105 HCHG RX REV CODE 258: Performed by: HOSPITALIST

## 2020-02-01 PROCEDURE — 99223 1ST HOSP IP/OBS HIGH 75: CPT | Performed by: HOSPITALIST

## 2020-02-01 PROCEDURE — 80053 COMPREHEN METABOLIC PANEL: CPT

## 2020-02-01 PROCEDURE — 96375 TX/PRO/DX INJ NEW DRUG ADDON: CPT

## 2020-02-01 PROCEDURE — 93005 ELECTROCARDIOGRAM TRACING: CPT | Performed by: INTERNAL MEDICINE

## 2020-02-01 PROCEDURE — 85025 COMPLETE CBC W/AUTO DIFF WBC: CPT

## 2020-02-01 PROCEDURE — 700105 HCHG RX REV CODE 258: Performed by: EMERGENCY MEDICINE

## 2020-02-01 PROCEDURE — 81001 URINALYSIS AUTO W/SCOPE: CPT

## 2020-02-01 RX ORDER — POLYETHYLENE GLYCOL 3350 17 G/17G
1 POWDER, FOR SOLUTION ORAL
Status: DISCONTINUED | OUTPATIENT
Start: 2020-02-01 | End: 2020-02-07 | Stop reason: HOSPADM

## 2020-02-01 RX ORDER — NORTRIPTYLINE HYDROCHLORIDE 25 MG/1
25 CAPSULE ORAL NIGHTLY
COMMUNITY

## 2020-02-01 RX ORDER — LORAZEPAM 2 MG/ML
0.5 INJECTION INTRAMUSCULAR ONCE
Status: COMPLETED | OUTPATIENT
Start: 2020-02-01 | End: 2020-02-01

## 2020-02-01 RX ORDER — MIFEPRISTONE 300 MG/1
300-600 TABLET, FILM COATED ORAL
COMMUNITY
End: 2022-03-21

## 2020-02-01 RX ORDER — CARVEDILOL 25 MG/1
25 TABLET ORAL 2 TIMES DAILY
Status: DISCONTINUED | OUTPATIENT
Start: 2020-02-01 | End: 2020-02-07 | Stop reason: HOSPADM

## 2020-02-01 RX ORDER — FENOFIBRATE 145 MG/1
145 TABLET, COATED ORAL DAILY
COMMUNITY

## 2020-02-01 RX ORDER — POTASSIUM CITRATE 10 MEQ/1
20 TABLET, EXTENDED RELEASE ORAL 3 TIMES DAILY
COMMUNITY

## 2020-02-01 RX ORDER — SODIUM CHLORIDE 9 MG/ML
1000 INJECTION, SOLUTION INTRAVENOUS ONCE
Status: COMPLETED | OUTPATIENT
Start: 2020-02-01 | End: 2020-02-01

## 2020-02-01 RX ORDER — DEXMEDETOMIDINE HYDROCHLORIDE 4 UG/ML
.1-1.5 INJECTION, SOLUTION INTRAVENOUS CONTINUOUS
Status: DISCONTINUED | OUTPATIENT
Start: 2020-02-01 | End: 2020-02-02

## 2020-02-01 RX ORDER — HEPARIN SODIUM 5000 [USP'U]/ML
5000 INJECTION, SOLUTION INTRAVENOUS; SUBCUTANEOUS EVERY 8 HOURS
Status: DISCONTINUED | OUTPATIENT
Start: 2020-02-01 | End: 2020-02-03

## 2020-02-01 RX ORDER — TAMSULOSIN HYDROCHLORIDE 0.4 MG/1
0.4 CAPSULE ORAL EVERY EVENING
COMMUNITY

## 2020-02-01 RX ORDER — ACETAMINOPHEN 325 MG/1
650 TABLET ORAL EVERY 6 HOURS PRN
Status: DISCONTINUED | OUTPATIENT
Start: 2020-02-01 | End: 2020-02-07 | Stop reason: HOSPADM

## 2020-02-01 RX ORDER — HYDROCHLOROTHIAZIDE 25 MG/1
25 TABLET ORAL 4 TIMES DAILY
COMMUNITY

## 2020-02-01 RX ORDER — FUROSEMIDE 20 MG/1
40 TABLET ORAL 2 TIMES DAILY
COMMUNITY

## 2020-02-01 RX ORDER — SODIUM CHLORIDE, SODIUM LACTATE, POTASSIUM CHLORIDE, CALCIUM CHLORIDE 600; 310; 30; 20 MG/100ML; MG/100ML; MG/100ML; MG/100ML
2000 INJECTION, SOLUTION INTRAVENOUS ONCE
Status: COMPLETED | OUTPATIENT
Start: 2020-02-01 | End: 2020-02-01

## 2020-02-01 RX ORDER — ONDANSETRON 2 MG/ML
4 INJECTION INTRAMUSCULAR; INTRAVENOUS EVERY 4 HOURS PRN
Status: DISCONTINUED | OUTPATIENT
Start: 2020-02-01 | End: 2020-02-07 | Stop reason: HOSPADM

## 2020-02-01 RX ORDER — BISACODYL 10 MG
10 SUPPOSITORY, RECTAL RECTAL
Status: DISCONTINUED | OUTPATIENT
Start: 2020-02-01 | End: 2020-02-07 | Stop reason: HOSPADM

## 2020-02-01 RX ORDER — SPIRONOLACTONE 25 MG/1
12.5 TABLET ORAL DAILY
COMMUNITY
End: 2022-03-21

## 2020-02-01 RX ORDER — AMOXICILLIN 250 MG
2 CAPSULE ORAL 2 TIMES DAILY
Status: DISCONTINUED | OUTPATIENT
Start: 2020-02-01 | End: 2020-02-07 | Stop reason: HOSPADM

## 2020-02-01 RX ORDER — ATORVASTATIN CALCIUM 40 MG/1
40 TABLET, FILM COATED ORAL NIGHTLY
COMMUNITY
End: 2022-03-21

## 2020-02-01 RX ORDER — SODIUM CHLORIDE, SODIUM LACTATE, POTASSIUM CHLORIDE, CALCIUM CHLORIDE 600; 310; 30; 20 MG/100ML; MG/100ML; MG/100ML; MG/100ML
INJECTION, SOLUTION INTRAVENOUS CONTINUOUS
Status: DISPENSED | OUTPATIENT
Start: 2020-02-01 | End: 2020-02-02

## 2020-02-01 RX ORDER — CARVEDILOL 25 MG/1
25 TABLET ORAL 2 TIMES DAILY
COMMUNITY
End: 2022-03-21

## 2020-02-01 RX ORDER — GLIMEPIRIDE 4 MG/1
4 TABLET ORAL EVERY MORNING
COMMUNITY

## 2020-02-01 RX ORDER — HALOPERIDOL 5 MG/ML
2-5 INJECTION INTRAMUSCULAR EVERY 4 HOURS PRN
Status: DISCONTINUED | OUTPATIENT
Start: 2020-02-01 | End: 2020-02-07 | Stop reason: HOSPADM

## 2020-02-01 RX ORDER — FINASTERIDE 5 MG/1
5 TABLET, FILM COATED ORAL DAILY
COMMUNITY

## 2020-02-01 RX ORDER — ONDANSETRON 4 MG/1
4 TABLET, ORALLY DISINTEGRATING ORAL EVERY 4 HOURS PRN
Status: DISCONTINUED | OUTPATIENT
Start: 2020-02-01 | End: 2020-02-07 | Stop reason: HOSPADM

## 2020-02-01 RX ORDER — ENALAPRIL MALEATE 20 MG/1
20 TABLET ORAL 2 TIMES DAILY
Status: DISCONTINUED | OUTPATIENT
Start: 2020-02-01 | End: 2020-02-01

## 2020-02-01 RX ADMIN — SODIUM CHLORIDE, POTASSIUM CHLORIDE, SODIUM LACTATE AND CALCIUM CHLORIDE: 600; 310; 30; 20 INJECTION, SOLUTION INTRAVENOUS at 21:09

## 2020-02-01 RX ADMIN — DEXMEDETOMIDINE HYDROCHLORIDE 0.5 MCG/KG/HR: 100 INJECTION, SOLUTION INTRAVENOUS at 15:44

## 2020-02-01 RX ADMIN — SODIUM CHLORIDE 5 UNITS/HR: 9 INJECTION, SOLUTION INTRAVENOUS at 15:01

## 2020-02-01 RX ADMIN — SODIUM CHLORIDE, POTASSIUM CHLORIDE, SODIUM LACTATE AND CALCIUM CHLORIDE: 600; 310; 30; 20 INJECTION, SOLUTION INTRAVENOUS at 15:11

## 2020-02-01 RX ADMIN — HEPARIN SODIUM 5000 UNITS: 5000 INJECTION, SOLUTION INTRAVENOUS; SUBCUTANEOUS at 16:18

## 2020-02-01 RX ADMIN — LORAZEPAM 0.5 MG: 2 INJECTION INTRAMUSCULAR; INTRAVENOUS at 12:37

## 2020-02-01 RX ADMIN — SODIUM CHLORIDE, POTASSIUM CHLORIDE, SODIUM LACTATE AND CALCIUM CHLORIDE 2000 ML: 600; 310; 30; 20 INJECTION, SOLUTION INTRAVENOUS at 13:49

## 2020-02-01 RX ADMIN — LORAZEPAM 0.5 MG: 2 INJECTION INTRAMUSCULAR; INTRAVENOUS at 14:34

## 2020-02-01 RX ADMIN — INSULIN HUMAN 5 UNITS: 100 INJECTION, SOLUTION PARENTERAL at 15:06

## 2020-02-01 RX ADMIN — HEPARIN SODIUM 5000 UNITS: 5000 INJECTION, SOLUTION INTRAVENOUS; SUBCUTANEOUS at 21:30

## 2020-02-01 RX ADMIN — INSULIN HUMAN 10 UNITS: 100 INJECTION, SOLUTION PARENTERAL at 12:44

## 2020-02-01 RX ADMIN — SODIUM CHLORIDE 1000 ML: 9 INJECTION, SOLUTION INTRAVENOUS at 12:42

## 2020-02-01 ASSESSMENT — LIFESTYLE VARIABLES
REASON UNABLE TO ASSESS: ALOC
EVER_SMOKED: NEVER

## 2020-02-01 ASSESSMENT — COPD QUESTIONNAIRES
HAVE YOU SMOKED AT LEAST 100 CIGARETTES IN YOUR ENTIRE LIFE: NO/DON'T KNOW
DO YOU EVER COUGH UP ANY MUCUS OR PHLEGM?: NO/ONLY WITH OCCASIONAL COLDS OR INFECTIONS
DURING THE PAST 4 WEEKS HOW MUCH DID YOU FEEL SHORT OF BREATH: NONE/LITTLE OF THE TIME
COPD SCREENING SCORE: 2

## 2020-02-01 NOTE — ASSESSMENT & PLAN NOTE
Resume ACE inhibitor  Hold Coreg until bradycardia improves  Hold spironolactone/hydrochlorothiazide while hypovolemic

## 2020-02-01 NOTE — ED TRIAGE NOTES
"..  Chief Complaint   Patient presents with   • Altered Mental Status     pt arrived with wife who is currently being admitted. the pt was found in a storage room very comfused the pt then was found by security in another persons car trying to drive \"home\" he stated. the pt had no recollection he took an abulance with his wife earlier today and the car he was sitting in was not his. poor hx but wife reports he has been \"bad the last two days\" refering ot his memory and that he has diabetes   ..  Vitals:    02/01/20 1128   BP: 151/77   Pulse: 100   Resp: 18   Temp:    SpO2:      "

## 2020-02-01 NOTE — ASSESSMENT & PLAN NOTE
Likely due to secondary to hyperosmolar nonketotic state  improving  Left a message to the son  Cognitive eval

## 2020-02-01 NOTE — ASSESSMENT & PLAN NOTE
Acute on chronic kidney disease likely due to dehydration -improving with hydration  Continue fluids  Avoid nephrotoxins, monitor creatinine, urine output, electrolytes

## 2020-02-01 NOTE — H&P
Hospital Medicine History & Physical Note    Date of Service  2/1/2020    Primary Care Physician  CYDNEY Cabral.    Consultants  Critical care medicine    Code Status  Full    Chief Complaint  Altered mentation    History of Presenting Illness  69 y.o. male who has a history of type 2 diabetes.  He presented to the emergency room actually with his wife.  His wife had come in with an acute flare of her chronic back pain.  At some point on the patient got up and left his wife's room, and staff found him in the storage closet here in the emergency room.  He then tried to leave the emergency room, and was found in the parking lot and somebody else's car trying to start it.  He was brought back to the emergency room where he was asked some pointed questions and as it happens he is oriented only to his wife.  Labs and a CT were done.  The CT of his head is normal.  His labs were significant for a glucose greater than 850, CO2 and anion gap were benign.    Review of Systems  Review of Systems   Unable to perform ROS: Mental status change       Past Medical History   has a past medical history of Diabetes, Emphysema of lung (HCC), High cholesterol, Hyperlipidemia, Hypertension, Pain, Renal disorder, Sleep apnea, and Snoring.    Surgical History   has a past surgical history that includes other (1995); recovery (6/21/2010); percutaneous nephrostolithotomy (6/23/2010); lumbar laminectomy diskectomy (Right, 9/3/2019); and foraminotomy (Right, 9/3/2019).     Family History  family history includes Heart Attack (age of onset: 57) in his father; Other (age of onset: 72) in his sister; Sleep Apnea in his sister.     Social History   reports that he has never smoked. He has never used smokeless tobacco. He reports that he does not drink alcohol or use drugs.    Allergies  Allergies   Allergen Reactions   • Penicillins Swelling     Turn red  With spots       Medications  Prior to Admission Medications   Prescriptions Last Dose  Informant Patient Reported? Taking?   Dulaglutide (TRULICITY) 1.5 MG/0.5ML Solution Pen-injector unk at [facility] Yes No   Sig: Inject 1.5 mg as instructed every 7 days.   Insulin Glargine (BASAGLAR KWIKPEN) 100 UNIT/ML Solution Pen-injector unk at [facility] Home Pharm[facility] Yes No   Si-74 Units 2 times a day. 74 units AM, 52 units PM   atorvastatin (LIPITOR) 40 MG Tab unk at [facility] Pharm[facility] Yes Yes   Sig: Take 40 mg by mouth every evening.   carvedilol (COREG) 25 MG Tab unk at [facility] Home Pharm[facility] Yes Yes   Sig: Take 25 mg by mouth 2 Times a Day.   enalapril (VASOTEC) 20 MG tablet unk at [facility] Home Pharm[facility] Yes No   Sig: Take 1 Tab by mouth 2 Times a Day.   miFEPRIStone (KORLYM) 300 MG Tab unk at [facility] Home Pharm[facility] Yes No   Sig: Take 300-600 mg by mouth every 48 hours.   potassium citrate SR (UROCIT-K SR) 10 MEQ (1080 MG) Tab CR unk at [facility] Patient[facility] Home Pharm[facility] Yes Yes   Sig: Take 20 mEq by mouth 3 times a day.      Facility-Administered Medications: None       Physical Exam  Temp:  [36.8 °C (98.2 °F)] 36.8 °C (98.2 °F)  Pulse:  [] 99  Resp:  [18] 18  BP: (151-154)/(77-86) 151/77  SpO2:  [94 %-98 %] 97 %    Physical Exam  Vitals signs reviewed.   Constitutional:       General: He is not in acute distress.     Appearance: He is well-developed. He is not diaphoretic.   HENT:      Head: Normocephalic and atraumatic.   Eyes:      Conjunctiva/sclera: Conjunctivae normal.   Neck:      Vascular: No JVD.   Cardiovascular:      Rate and Rhythm: Tachycardia present.      Heart sounds: No murmur. No gallop.    Pulmonary:      Effort: Pulmonary effort is normal. No respiratory distress.      Breath sounds: No stridor. No wheezing or rales.   Abdominal:      Palpations: Abdomen is soft.      Tenderness: There is no tenderness. There is no guarding or rebound.   Skin:     General: Skin is warm and dry.      Findings: No rash.   Neurological:      Mental Status:  He is disoriented.      Comments: Oriented to self unable to tell me what kind of building he is in does not know the day or date.  Does not recall the fact that he met me 15 minutes ago.  Patient's gait is normal motor function is intact cranial nerves II through XII intact             Laboratory:  Recent Labs     02/01/20  1143   WBC 6.6   RBC 4.25*   HEMOGLOBIN 12.8*   HEMATOCRIT 37.3*   MCV 87.8   MCH 30.1   MCHC 34.3   RDW 43.8   PLATELETCT 227   MPV 10.3     Recent Labs     02/01/20  1143   SODIUM 129*   POTASSIUM 4.8   CHLORIDE 95*   CO2 21   GLUCOSE 855*   BUN 48*   CREATININE 2.02*   CALCIUM 9.7     Recent Labs     02/01/20  1143   ALTSGPT 13   ASTSGOT 11*   ALKPHOSPHAT 77   TBILIRUBIN 1.1   GLUCOSE 855*         No results for input(s): NTPROBNP in the last 72 hours.      No results for input(s): TROPONINT in the last 72 hours.    Urinalysis:    Recent Labs     02/01/20  1204   SPECGRAVITY 1.036   GLUCOSEUR >=1000*   KETONES Negative   NITRITE Negative   LEUKESTERAS Negative   WBCURINE 0-2*   RBCURINE 0-2*   BACTERIA Negative   EPITHELCELL Negative        Imaging:  CT-HEAD W/O   Final Result         1. No acute intracranial abnormality. No evidence of acute intracranial hemorrhage or mass lesion.                     Assessment/Plan:  I anticipate this patient will require at least two midnights for appropriate medical management, necessitating inpatient admission.    Acute metabolic encephalopathy  Assessment & Plan  Of etiology would appear to be secondary to hyperosmolar nonketotic state  We will treat the above and monitor the patient in the ICU  If he fails to clear then would entertain further work-up.    Hyperosmolar coma (HCC)  Assessment & Plan  Etiology is unclear.  Patient's wife thinks that he has been taking his medications as he normally does however she is uncertain.  No evidence of infection on exam or and lab work  Patient has been given 1 L of NS here in the ED and 10 units of insulin.  I  have asked the ED nurse to bolus with further 2 L of lactated Ringer's  We will admit to the ICU on an insulin IV protocol  Follow every 6 hours BMP  Check urine tox screen and blood cultures    Essential hypertension- (present on admission)  Assessment & Plan  Continue beta-blocker with parameters.  Holding the patient's ACE inhibitor    Type 2 diabetes mellitus without complication (HCC)- (present on admission)  Assessment & Plan  Unknown how well the patient is controlled at baseline as we do not have an A1c  Check A1c  Hold patient's chronic therapy while we correct his hyper osmolar state  Resume his home medication regimen tomorrow and then monitor    CKD (chronic kidney disease) stage 3, GFR 30-59 ml/min ([facility])- (present on admission)  Assessment & Plan  Patient has stage III for chronic kidney disease and is about at his baseline at present.    Continue fluid resuscitation   repeat BMP in the a.m.  Renally dose medications as appropriate  Hold ARB      Dyslipidemia- (present on admission)  Assessment & Plan  Not an acute issue      VTE prophylaxis: Heparin

## 2020-02-01 NOTE — ASSESSMENT & PLAN NOTE
Unknown how well the patient is controlled at baseline as we do not have an A1c  Check A1c  Hold patient's chronic therapy while we correct his hyper osmolar state  Resume his home medication regimen tomorrow and then monitor

## 2020-02-01 NOTE — ED PROVIDER NOTES
"ED Provider Note    CHIEF COMPLAINT  Chief Complaint   Patient presents with   • Altered Mental Status     pt arrived with wife who is currently being admitted. the pt was found in a storage room very comfused the pt then was found by security in another persons car trying to drive \"home\" he stated. the pt had no recollection he took an abulance with his wife earlier today and the car he was sitting in was not his. poor hx but wife reports he has been \"bad the last two days\" refering ot his memory and that he has diabetes       HPI  Davie Rodriguez is a 69 y.o. male who presents for evaluation of altered mental status.  The patient reportedly came in by ambulance with his wife who was the patient.  She is currently being admitted.  The patient was attempting to go home and was found in a storage room confused.  He was then found in the parking lot and someone else's car trying to started to drive at home.  The patient had no recollection of taking an ambulance to the hospital today.  He actually has no recollection of trying to take another car or being in a storage room.  He denies any physical complaints stating that he feels absolutely fine.  He states he does have diabetes.  He denies any urinary symptoms.  He has no chest pain or abdominal pain.    REVIEW OF SYSTEMS  See HPI for further details.  Uncertain due to patient's condition.    PAST MEDICAL HISTORY  Past Medical History:   Diagnosis Date   • Diabetes     insulin and oral medication    • Emphysema of lung (HCC)    • High cholesterol    • Hyperlipidemia    • Hypertension    • Pain     lower back pain    • Renal disorder     STONES   • Sleep apnea     uses CPAP with oxygen    • Snoring        FAMILY HISTORY  Family History   Problem Relation Age of Onset   • Heart Attack Father 57   • Other Sister 72        diabetes   • Sleep Apnea Sister        SOCIAL HISTORY  Social History     Socioeconomic History   • Marital status:      Spouse name: " Not on file   • Number of children: Not on file   • Years of education: Not on file   • Highest education level: Not on file   Occupational History   • Not on file   Social Needs   • Financial resource strain: Not on file   • Food insecurity:     Worry: Not on file     Inability: Not on file   • Transportation needs:     Medical: Not on file     Non-medical: Not on file   Tobacco Use   • Smoking status: Never Smoker   • Smokeless tobacco: Never Used   Substance and Sexual Activity   • Alcohol use: No   • Drug use: No   • Sexual activity: Not on file   Lifestyle   • Physical activity:     Days per week: Not on file     Minutes per session: Not on file   • Stress: Not on file   Relationships   • Social connections:     Talks on phone: Not on file     Gets together: Not on file     Attends Yazdanism service: Not on file     Active member of club or organization: Not on file     Attends meetings of clubs or organizations: Not on file     Relationship status: Not on file   • Intimate partner violence:     Fear of current or ex partner: Not on file     Emotionally abused: Not on file     Physically abused: Not on file     Forced sexual activity: Not on file   Other Topics Concern   • Not on file   Social History Narrative   • Not on file       SURGICAL HISTORY  Past Surgical History:   Procedure Laterality Date   • LUMBAR LAMINECTOMY DISKECTOMY Right 9/3/2019    Procedure: LAMINECTOMY, SPINE, LUMBAR, WITH DISCECTOMY - L4-5 HEMILAMINECTOMY W/RIGHT MEDIAL FACETECTOMY;  Surgeon: Meir Moss M.D.;  Location: [facility];  Service: Neurosurgery   • FORAMINOTOMY Right 9/3/2019    Procedure: FORAMINOTOMY, SPINE - L4-5;  Surgeon: Meir Moss M.D.;  Location: [facility];  Service: Neurosurgery   • PERCUTANEOUS NEPHROSTOLITHOTOMY  6/23/2010    Performed by FOX GARCIA at [facility]   • RECOVERY  6/21/2010    Performed by LANA, IR-RECOVERY at SURGERY SAME DAY [facility]  "  • OTHER  1995    GALL BLADDER REMOVED       CURRENT MEDICATIONS  Home Medications    **Home medications have not yet been reviewed for this encounter**         ALLERGIES  Allergies   Allergen Reactions   • Penicillins Swelling     Turn red  With spots       PHYSICAL EXAM  VITAL SIGNS: /77 Comment: Simultaneous filing. User may not have seen previous data.  Pulse (!) 101 Comment: Simultaneous filing. User may not have seen previous data.  Temp 36.8 °C (98.2 °F) (Temporal)   Resp 18   Ht 1.88 m (6' 2\")   Wt 103.5 kg (228 lb 2.8 oz)   SpO2 96%   BMI 29.30 kg/m²   Constitutional: Well developed, Well nourished, No acute distress, Non-toxic appearance.   HENT: Normocephalic, Atraumatic.  Eyes:  EOMI, Conjunctiva normal, No discharge.   Cardiovascular: Normal heart rate, Normal rhythm, No murmurs, No rubs, No gallops.   Thorax & Lungs: Clear to auscultation without wheezes, rales, or rhonchi. No chest tenderness.   Abdomen: Bowel sounds normal, Soft, No tenderness, No masses, No pulsatile masses.   Skin: Warm, Dry.  Musculoskeletal: Good range of motion in all major joints.   Neurologic: Awake and alert but clearly confused.  Normal motor function, Normal sensory function, No focal deficits noted.           RADIOLOGY/PROCEDURES  CT-HEAD W/O    (Results Pending)         COURSE & MEDICAL DECISION MAKING  Pertinent Labs & Imaging studies reviewed. (See chart for details)  This is a 69-year-old here for evaluation of altered mental status.  The patient came in by ambulance today with his wife who actually was the patient.  The patient was leaving and was found in a storage room confused.  He then was found in someone else's car in the parking lot trying to drive at home.  On my evaluation the patient has no medical complaints he is very pleasant but is confused.  Does not have a recollection of the storage room or trying to drive the vehicle away.  He appears neurologically intact.  Laboratory work-up is " significant for a glucose of 855 with a normal bicarb.  BUN of 48 and a creatinine of 2.02.  I suspect the patient's altered mental status is because he is hyperosmolar.  He is treated with Ativan because he kept wanting to get out of bed.  He is also treated with IV normal saline and IV insulin.  His urine does not appear to be infected.  White blood cell count is normal.  I discussed the results of the studies with the patient.  I have spoken with Dr. Prabhakar of the hospitalist service and he will be the primary admitting physician.    FINAL IMPRESSION  1.  Altered mental status  2.  Hyperglycemia   3.  Hyperosmolar state resulting in confusion  4.  Chronic renal insufficiency         Electronically signed by: Davie Huizar M.D., 2/1/2020 11:34 AM

## 2020-02-01 NOTE — ED NOTES
Saravanan from Lab called with critical result of Glucose of 855 at 1224. Critical lab result read back to Philipp HERNÁNDEZ.   Dr. Huizar notified of critical lab result at 1225.  Critical lab result read back by Dr. Huizar.  Mor RN notified

## 2020-02-01 NOTE — DISCHARGE PLANNING
Unable to complete CC assessment at this time. Pt's wife is also being admitted (no room assigned at this time).

## 2020-02-01 NOTE — ED NOTES
Pharmacy Medication Reconciliation      Medication reconciliation updated and complete per pt home pharmacy  No oral ABX within the last 14 days  Pt home pharmacy:[facility]    Pt home pharmacy reports pt received a 90 day supply of BASAGLAR on 07/09/2019    Pt home pharmacy reports pt received a 90 day supply of TRULICITY on 08/12/2019

## 2020-02-01 NOTE — ASSESSMENT & PLAN NOTE
Out-of-control  Continue insulin drip  Diabetic diet and eventual diabetic education when appropriate

## 2020-02-01 NOTE — ED NOTES
Pt agreeable when interacted with and redirectable momentarily but otherwise continually agitated and confused. Requires frequent/ nearly continually redirection. Pt removing monitors, frequently attempting to leave, disoriented to place, situation, time, etc. Pt only oriented to self.

## 2020-02-01 NOTE — CONSULTS
Critical Care Consultation    Date of consult: 2/1/2020    Referring Physician  Davie Huizar M.D.    Reason for Consultation  Altered mental status and hyperglycemia    History of Presenting Illness  69 y.o. male who presented 2/1/2020 with type 2 diabetes.  He was brought in by ambulance with his wife.  EMS was actually called because of his wife's back pain.  In the emergency department he was reportedly was found wandering around confused oriented x0.  He had a urgent CT of his head was unremarkable and he was noted to have a glucose of 850 with a normal serum bicarb and anion gap.  He was brought into administered in the emergency department and evaluation and treatment was begun.  ICU consultation was obtained for hyperosmolar state management and treatment.  No other history is available at time of dictation as patient cannot contribute and wife is being evaluated elsewhere in emergency department.    Code Status  Full Code    Review of Systems  Review of Systems   Unable to perform ROS: Mental status change   All other systems reviewed and are negative.      Past Medical History   has a past medical history of Diabetes, Emphysema of lung (HCC), High cholesterol, Hyperlipidemia, Hypertension, Pain, Renal disorder, Sleep apnea, and Snoring.    Surgical History   has a past surgical history that includes other (1995); recovery (6/21/2010); percutaneous nephrostolithotomy (6/23/2010); lumbar laminectomy diskectomy (Right, 9/3/2019); and foraminotomy (Right, 9/3/2019).    Family History  family history includes Heart Attack (age of onset: 57) in his father; Other (age of onset: 72) in his sister; Sleep Apnea in his sister.    Social History   reports that he has never smoked. He has never used smokeless tobacco. He reports that he does not drink alcohol or use drugs.    Medications  Home Medications     Reviewed by David Lin (Pharmacy Tech) on 02/01/20 at 1325  Med List Status: Complete   Medication  Last Dose Status   atorvastatin (LIPITOR) 40 MG Tab unk Active   carvedilol (COREG) 25 MG Tab unk Active   Dulaglutide (TRULICITY) 1.5 MG/0.5ML Solution Pen-injector unk Active   enalapril (VASOTEC) 20 MG tablet unk Active   Insulin Glargine (BASAGLAR KWIKPEN) 100 UNIT/ML Solution Pen-injector unk Active   miFEPRIStone (KORLYM) 300 MG Tab unk Active   potassium citrate SR (UROCIT-K SR) 10 MEQ (1080 MG) Tab CR unk Active              Current Facility-Administered Medications   Medication Dose Route Frequency Provider Last Rate Last Dose   • carvedilol (COREG) tablet 25 mg  25 mg Oral BID TATUM Weston.O.       • senna-docusate (PERICOLACE or SENOKOT S) 8.6-50 MG per tablet 2 Tab  2 Tab Oral BID TATUM Weston.O.        And   • polyethylene glycol/lytes (MIRALAX) PACKET 1 Packet  1 Packet Oral QDAY PRN TATUM Weston.O.        And   • magnesium hydroxide (MILK OF MAGNESIA) suspension 30 mL  30 mL Oral QDAY PRN TATUM Weston.O.        And   • bisacodyl (DULCOLAX) suppository 10 mg  10 mg Rectal QDAY PRN TAUTM Weston.O.       • Respiratory Care per Protocol   Nebulization Continuous RT TATUM Weston.O.       • lactated ringers infusion   Intravenous Continuous TATUM Weston.O. 200 mL/hr at 02/01/20 1511     • acetaminophen (TYLENOL) tablet 650 mg  650 mg Oral Q6HRS PRN TATUM Weston.O.       • ondansetron (ZOFRAN) syringe/vial injection 4 mg  4 mg Intravenous Q4HRS PRN TATUM Weston.O.       • ondansetron (ZOFRAN ODT) dispertab 4 mg  4 mg Oral Q4HRS PRN TATUM Weston.O.       • insulin regular human (HUMULIN/NOVOLIN R) 62.5 Units in  mL infusion per protocol  0-29 Units/hr Intravenous Continuous Kurtis Fung D.O. 20 mL/hr at 02/01/20 1501 5 Units/hr at 02/01/20 1501   • DEXTROSE 10% BOLUS 125-250 mL  125-250 mL Intravenous PRN Kurtis Fung D.O.       • heparin injection 5,000  Units  5,000 Units Subcutaneous Q8HRS Kurtis Fung D.O.       • dexmedetomidine (PRECEDEX) 400 mcg/100mL NS premix infusion  0.1-1.5 mcg/kg/hr Intravenous Continuous Jeremy M Gonda, M.D. 12.9 mL/hr at 02/01/20 1544 0.5 mcg/kg/hr at 02/01/20 1544   • haloperidol lactate (HALDOL) injection 2-5 mg  2-5 mg Intravenous Q4HRS PRN Jeremy M Gonda, M.D.           Allergies  Allergies   Allergen Reactions   • Penicillins Swelling     Turn red  With spots       Vital Signs last 24 hours  Temp:  [36.8 °C (98.2 °F)] 36.8 °C (98.2 °F)  Pulse:  [] 91  Resp:  [18] 18  BP: (142-154)/(70-86) 142/70  SpO2:  [94 %-98 %] 97 %    Physical Exam  Physical Exam  Vitals reviewed: Obese white male in no distress pleasant but clearly very confused.   Constitutional:       Appearance: Normal appearance. He is normal weight. He is diaphoretic.   HENT:      Head: Normocephalic and atraumatic.      Nose: Nose normal.   Eyes:      Extraocular Movements: Extraocular movements intact.      Pupils: Pupils are equal, round, and reactive to light.   Neck:      Musculoskeletal: Normal range of motion and neck supple.   Cardiovascular:      Rate and Rhythm: Normal rate and regular rhythm.      Heart sounds: Normal heart sounds.   Pulmonary:      Effort: Pulmonary effort is normal.   Skin:     General: Skin is warm.   Neurological:      Mental Status: He is alert.         Fluids    Intake/Output Summary (Last 24 hours) at 2/1/2020 1555  Last data filed at 2/1/2020 1336  Gross per 24 hour   Intake 1000 ml   Output --   Net 1000 ml       Laboratory  Recent Results (from the past 48 hour(s))   CBC WITH DIFFERENTIAL    Collection Time: 02/01/20 11:43 AM   Result Value Ref Range    WBC 6.6 4.8 - 10.8 K/uL    RBC 4.25 (L) 4.70 - 6.10 M/uL    Hemoglobin 12.8 (L) 14.0 - 18.0 g/dL    Hematocrit 37.3 (L) 42.0 - 52.0 %    MCV 87.8 81.4 - 97.8 fL    MCH 30.1 27.0 - 33.0 pg    MCHC 34.3 33.7 - 35.3 g/dL    RDW 43.8 35.9 - 50.0 fL    Platelet Count 227  164 - 446 K/uL    MPV 10.3 9.0 - 12.9 fL    Neutrophils-Polys 77.20 (H) 44.00 - 72.00 %    Lymphocytes 11.40 (L) 22.00 - 41.00 %    Monocytes 7.20 0.00 - 13.40 %    Eosinophils 2.00 0.00 - 6.90 %    Basophils 0.80 0.00 - 1.80 %    Immature Granulocytes 1.40 (H) 0.00 - 0.90 %    Nucleated RBC 0.00 /100 WBC    Neutrophils (Absolute) 5.07 1.82 - 7.42 K/uL    Lymphs (Absolute) 0.75 (L) 1.00 - 4.80 K/uL    Monos (Absolute) 0.47 0.00 - 0.85 K/uL    Eos (Absolute) 0.13 0.00 - 0.51 K/uL    Baso (Absolute) 0.05 0.00 - 0.12 K/uL    Immature Granulocytes (abs) 0.09 0.00 - 0.11 K/uL    NRBC (Absolute) 0.00 K/uL   COMP METABOLIC PANEL    Collection Time: 02/01/20 11:43 AM   Result Value Ref Range    Sodium 129 (L) 135 - 145 mmol/L    Potassium 4.8 3.6 - 5.5 mmol/L    Chloride 95 (L) 96 - 112 mmol/L    Co2 21 20 - 33 mmol/L    Anion Gap 13.0 (H) 0.0 - 11.9    Glucose 855 (HH) 65 - 99 mg/dL    Bun 48 (H) 8 - 22 mg/dL    Creatinine 2.02 (H) 0.50 - 1.40 mg/dL    Calcium 9.7 8.5 - 10.5 mg/dL    AST(SGOT) 11 (L) 12 - 45 U/L    ALT(SGPT) 13 2 - 50 U/L    Alkaline Phosphatase 77 30 - 99 U/L    Total Bilirubin 1.1 0.1 - 1.5 mg/dL    Albumin 3.4 3.2 - 4.9 g/dL    Total Protein 6.4 6.0 - 8.2 g/dL    Globulin 3.0 1.9 - 3.5 g/dL    A-G Ratio 1.1 g/dL   ESTIMATED GFR    Collection Time: 02/01/20 11:43 AM   Result Value Ref Range    GFR If  40 (A) >60 mL/min/1.73 m 2    GFR If Non  33 (A) >60 mL/min/1.73 m 2   AMMONIA    Collection Time: 02/01/20 11:54 AM   Result Value Ref Range    Ammonia 28 11 - 45 umol/L   Magnesium    Collection Time: 02/01/20 11:54 AM   Result Value Ref Range    Magnesium 2.0 1.5 - 2.5 mg/dL   URINALYSIS CULTURE, IF INDICATED    Collection Time: 02/01/20 12:04 PM   Result Value Ref Range    Color Yellow     Character Clear     Specific Gravity 1.036 <1.035    Ph 5.0 5.0 - 8.0    Glucose >=1000 (A) Negative mg/dL    Ketones Negative Negative mg/dL    Protein 300 (A) Negative mg/dL     Bilirubin Negative Negative    Urobilinogen, Urine 0.2 Negative    Nitrite Negative Negative    Leukocyte Esterase Negative Negative    Occult Blood Trace (A) Negative    Micro Urine Req Microscopic    URINE MICROSCOPIC (W/UA)    Collection Time: 02/01/20 12:04 PM   Result Value Ref Range    WBC 0-2 (A) /hpf    RBC 0-2 (A) /hpf    Bacteria Negative None /hpf    Epithelial Cells Negative /hpf    Hyaline Cast 0-2 /lpf   ACCU-CHEK GLUCOSE    Collection Time: 02/01/20  2:19 PM   Result Value Ref Range    Glucose - Accu-Ck 510 (HH) 65 - 99 mg/dL       Imaging      Assessment/Plan  Acute metabolic encephalopathy  Assessment & Plan  Patient's confusion is from hyperosmolar state mild disoriented but nonfocal expressive and will likely improve rapidly with once we get the hyperosmolar state under good control. CT brain shows on acute findings.     Hyperosmolar coma (HCC)  Assessment & Plan  Patient has hyperosmolar coma that is not DKA.  Normal acid-base status normal serum bicarb.  Increased creatinine likely due to prerenal state will give additional fluids for the hyperosmolar plan which is insulin drip and fluids.  Also will do blood cultures semitrucks.  Look for other potential etiologies that failure to thrive type syndrome.    Essential hypertension- (present on admission)  Assessment & Plan  Normotensive now could be that sepsis has lowered her blood pressure. Continue to monitor closely    Type 2 diabetes mellitus without complication ([facility])- (present on admission)  Assessment & Plan  Insulin drip per glucose 180 protocol. Watch changes in sodium and glucose closely    CKD (chronic kidney disease) stage 3, GFR 30-59 ml/min ([facility])- (present on admission)  Assessment & Plan  Acute on chronic kidney disease here 90 etiology sepsis/ATN prerenal all the differential.  Will do our best to avoid nephrotoxic agents.      Discussed patient condition and risk of morbidity and/or mortality with Hospitalist, RN, RT, Pharmacy,  Dietary,  and .    The patient remains critically ill.  Critical care time = 35 minutes in directly providing and coordinating critical care and extensive data review.  No time overlap and excludes procedures.

## 2020-02-01 NOTE — ED NOTES
Pt agreeable when interacted with and redirectable momentarily but otherwise agitated, attempting to leave, anxious and confused. Disoriented to time, situation, place, etc. Only oriented to self. Pt removing monitors

## 2020-02-02 PROBLEM — E87.6 HYPOKALEMIA: Status: ACTIVE | Noted: 2020-02-02

## 2020-02-02 LAB
ANION GAP SERPL CALC-SCNC: 4 MMOL/L (ref 0–11.9)
ANION GAP SERPL CALC-SCNC: 5 MMOL/L (ref 0–11.9)
ANION GAP SERPL CALC-SCNC: 6 MMOL/L (ref 0–11.9)
BUN SERPL-MCNC: 24 MG/DL (ref 8–22)
BUN SERPL-MCNC: 26 MG/DL (ref 8–22)
BUN SERPL-MCNC: 31 MG/DL (ref 8–22)
CALCIUM SERPL-MCNC: 8.8 MG/DL (ref 8.5–10.5)
CALCIUM SERPL-MCNC: 9.1 MG/DL (ref 8.5–10.5)
CALCIUM SERPL-MCNC: 9.2 MG/DL (ref 8.5–10.5)
CHLORIDE SERPL-SCNC: 105 MMOL/L (ref 96–112)
CHLORIDE SERPL-SCNC: 108 MMOL/L (ref 96–112)
CHLORIDE SERPL-SCNC: 109 MMOL/L (ref 96–112)
CO2 SERPL-SCNC: 25 MMOL/L (ref 20–33)
CO2 SERPL-SCNC: 25 MMOL/L (ref 20–33)
CO2 SERPL-SCNC: 26 MMOL/L (ref 20–33)
CREAT SERPL-MCNC: 1.53 MG/DL (ref 0.5–1.4)
CREAT SERPL-MCNC: 1.55 MG/DL (ref 0.5–1.4)
CREAT SERPL-MCNC: 1.56 MG/DL (ref 0.5–1.4)
GLUCOSE BLD-MCNC: 140 MG/DL (ref 65–99)
GLUCOSE BLD-MCNC: 151 MG/DL (ref 65–99)
GLUCOSE BLD-MCNC: 158 MG/DL (ref 65–99)
GLUCOSE BLD-MCNC: 163 MG/DL (ref 65–99)
GLUCOSE BLD-MCNC: 166 MG/DL (ref 65–99)
GLUCOSE BLD-MCNC: 168 MG/DL (ref 65–99)
GLUCOSE BLD-MCNC: 173 MG/DL (ref 65–99)
GLUCOSE BLD-MCNC: 174 MG/DL (ref 65–99)
GLUCOSE BLD-MCNC: 183 MG/DL (ref 65–99)
GLUCOSE BLD-MCNC: 183 MG/DL (ref 65–99)
GLUCOSE BLD-MCNC: 185 MG/DL (ref 65–99)
GLUCOSE BLD-MCNC: 210 MG/DL (ref 65–99)
GLUCOSE BLD-MCNC: 286 MG/DL (ref 65–99)
GLUCOSE BLD-MCNC: 319 MG/DL (ref 65–99)
GLUCOSE BLD-MCNC: 374 MG/DL (ref 65–99)
GLUCOSE BLD-MCNC: 423 MG/DL (ref 65–99)
GLUCOSE SERPL-MCNC: 140 MG/DL (ref 65–99)
GLUCOSE SERPL-MCNC: 204 MG/DL (ref 65–99)
GLUCOSE SERPL-MCNC: 285 MG/DL (ref 65–99)
POTASSIUM SERPL-SCNC: 3.4 MMOL/L (ref 3.6–5.5)
POTASSIUM SERPL-SCNC: 3.7 MMOL/L (ref 3.6–5.5)
POTASSIUM SERPL-SCNC: 3.9 MMOL/L (ref 3.6–5.5)
SODIUM SERPL-SCNC: 136 MMOL/L (ref 135–145)
SODIUM SERPL-SCNC: 138 MMOL/L (ref 135–145)
SODIUM SERPL-SCNC: 139 MMOL/L (ref 135–145)

## 2020-02-02 PROCEDURE — 770022 HCHG ROOM/CARE - ICU (200)

## 2020-02-02 PROCEDURE — A9270 NON-COVERED ITEM OR SERVICE: HCPCS | Performed by: HOSPITALIST

## 2020-02-02 PROCEDURE — 51798 US URINE CAPACITY MEASURE: CPT

## 2020-02-02 PROCEDURE — A9270 NON-COVERED ITEM OR SERVICE: HCPCS | Performed by: INTERNAL MEDICINE

## 2020-02-02 PROCEDURE — 80048 BASIC METABOLIC PNL TOTAL CA: CPT

## 2020-02-02 PROCEDURE — 700102 HCHG RX REV CODE 250 W/ 637 OVERRIDE(OP): Performed by: HOSPITALIST

## 2020-02-02 PROCEDURE — 700105 HCHG RX REV CODE 258: Performed by: HOSPITALIST

## 2020-02-02 PROCEDURE — 302172 SOFT BED BELT: Performed by: INTERNAL MEDICINE

## 2020-02-02 PROCEDURE — 700105 HCHG RX REV CODE 258: Performed by: INTERNAL MEDICINE

## 2020-02-02 PROCEDURE — 700111 HCHG RX REV CODE 636 W/ 250 OVERRIDE (IP): Performed by: HOSPITALIST

## 2020-02-02 PROCEDURE — 99291 CRITICAL CARE FIRST HOUR: CPT | Performed by: INTERNAL MEDICINE

## 2020-02-02 PROCEDURE — 82962 GLUCOSE BLOOD TEST: CPT | Mod: 91

## 2020-02-02 PROCEDURE — 700102 HCHG RX REV CODE 250 W/ 637 OVERRIDE(OP): Performed by: INTERNAL MEDICINE

## 2020-02-02 RX ORDER — ZIPRASIDONE MESYLATE 20 MG/ML
20 INJECTION, POWDER, LYOPHILIZED, FOR SOLUTION INTRAMUSCULAR EVERY 4 HOURS PRN
Status: DISCONTINUED | OUTPATIENT
Start: 2020-02-02 | End: 2020-02-07 | Stop reason: HOSPADM

## 2020-02-02 RX ORDER — INSULIN GLARGINE 100 [IU]/ML
74 INJECTION, SOLUTION SUBCUTANEOUS
Status: DISCONTINUED | OUTPATIENT
Start: 2020-02-02 | End: 2020-02-03

## 2020-02-02 RX ORDER — INSULIN GLARGINE 100 [IU]/ML
52 INJECTION, SOLUTION SUBCUTANEOUS EVERY EVENING
Status: DISCONTINUED | OUTPATIENT
Start: 2020-02-02 | End: 2020-02-03

## 2020-02-02 RX ORDER — POTASSIUM CHLORIDE 20 MEQ/1
40 TABLET, EXTENDED RELEASE ORAL ONCE
Status: COMPLETED | OUTPATIENT
Start: 2020-02-02 | End: 2020-02-02

## 2020-02-02 RX ORDER — SODIUM CHLORIDE, SODIUM LACTATE, POTASSIUM CHLORIDE, CALCIUM CHLORIDE 600; 310; 30; 20 MG/100ML; MG/100ML; MG/100ML; MG/100ML
INJECTION, SOLUTION INTRAVENOUS CONTINUOUS
Status: DISCONTINUED | OUTPATIENT
Start: 2020-02-02 | End: 2020-02-03

## 2020-02-02 RX ADMIN — HEPARIN SODIUM 5000 UNITS: 5000 INJECTION, SOLUTION INTRAVENOUS; SUBCUTANEOUS at 14:00

## 2020-02-02 RX ADMIN — POTASSIUM CHLORIDE 40 MEQ: 1500 TABLET, EXTENDED RELEASE ORAL at 11:21

## 2020-02-02 RX ADMIN — INSULIN HUMAN 4 UNITS: 100 INJECTION, SOLUTION PARENTERAL at 17:13

## 2020-02-02 RX ADMIN — INSULIN HUMAN 3 UNITS: 100 INJECTION, SOLUTION PARENTERAL at 11:43

## 2020-02-02 RX ADMIN — INSULIN GLARGINE 74 UNITS: 100 INJECTION, SOLUTION SUBCUTANEOUS at 11:06

## 2020-02-02 RX ADMIN — HEPARIN SODIUM 5000 UNITS: 5000 INJECTION, SOLUTION INTRAVENOUS; SUBCUTANEOUS at 21:46

## 2020-02-02 RX ADMIN — SODIUM CHLORIDE, POTASSIUM CHLORIDE, SODIUM LACTATE AND CALCIUM CHLORIDE: 600; 310; 30; 20 INJECTION, SOLUTION INTRAVENOUS at 02:13

## 2020-02-02 RX ADMIN — SODIUM CHLORIDE, POTASSIUM CHLORIDE, SODIUM LACTATE AND CALCIUM CHLORIDE: 600; 310; 30; 20 INJECTION, SOLUTION INTRAVENOUS at 23:30

## 2020-02-02 RX ADMIN — CARVEDILOL 25 MG: 25 TABLET, FILM COATED ORAL at 17:07

## 2020-02-02 RX ADMIN — HEPARIN SODIUM 5000 UNITS: 5000 INJECTION, SOLUTION INTRAVENOUS; SUBCUTANEOUS at 05:25

## 2020-02-02 NOTE — PROGRESS NOTES
Pt assessed. Passed bedside dysphagia screening. Pt alert to self and location. Pt appears calm and cooperative. Education provided on restraints and criteria for removal and application. Pt verifies understanding. Restraints removed. Bed alarm on. Will assess for possible transition off Insulin and diet ordered during rounds.

## 2020-02-02 NOTE — CARE PLAN
Problem: Communication  Goal: The ability to communicate needs accurately and effectively will improve  Outcome: PROGRESSING AS EXPECTED  Note:   Pt. requiring frequent reorientation.

## 2020-02-02 NOTE — PROGRESS NOTES
Monitor summary:  Sinus saulo/rhythm with 1st degree HB HR 49-70's  .22/.10/.44     12 hour chart check

## 2020-02-02 NOTE — PROGRESS NOTES
Pt has not voided all night with condom cath.  Bladder scanned ordered.  905 ml recorded.  Straight cath performed per protocol.  900ml received.  Will recheck in 6 hours.

## 2020-02-02 NOTE — PROGRESS NOTES
Critical Care Progress Note    Date of admission  2/1/2020    Chief Complaint  69 y.o. male admitted 2/1/2020 with SANJU, NARAYAN    Hospital Course    69 y.o. male who presented 2/1/2020 with type 2 diabetes.  He was brought in by ambulance with his wife.  EMS was actually called because of his wife's back pain.  In the emergency department he was reportedly was found wandering around confused oriented x0.  He had a urgent CT of his head was unremarkable and he was noted to have a glucose of 850 with a normal serum bicarb and anion gap.  He was brought into administered in the emergency department and evaluation and treatment was begun.  ICU consultation was obtained for hyperosmolar state management and treatment.  No other history is available at time of dictation as patient cannot contribute and wife is being evaluated elsewhere in emergency department.      Interval Problem Update  Reviewed last 24 hour events:   - remained on insulin gtt @ 3   - intermittent precedex gtt needed for agitation control   - AF   - sinus saulo   - -160   - AAOx2-3 this morning   - improving RUPERT   - glucose being better controlled   - low K   - passed swallow eval   - urinating on own   - Bcx NGTD    Review of Systems  Review of Systems   Unable to perform ROS: Mental status change        Vital Signs for last 24 hours   Temp:  [36.3 °C (97.3 °F)-36.8 °C (98.2 °F)] 36.3 °C (97.3 °F)  Pulse:  [] 64  Resp:  [11-39] 19  BP: ()/(40-86) 189/73  SpO2:  [90 %-100 %] 99 %    Respiratory Information for the last 24 hours   2 lpm n/c, h/o SHAKA but non-compliant with CPAP    Physical Exam   Physical Exam  Vitals signs and nursing note reviewed.   Constitutional:       General: He is not in acute distress.     Appearance: He is obese. He is ill-appearing. He is not diaphoretic.      Comments: Confused but redirectable   HENT:      Head: Normocephalic and atraumatic.      Right Ear: External ear normal.      Left Ear: External ear  normal.      Nose: Nose normal. No congestion.      Mouth/Throat:      Mouth: Mucous membranes are dry.      Pharynx: Oropharynx is clear.   Eyes:      General: No scleral icterus.     Conjunctiva/sclera: Conjunctivae normal.      Pupils: Pupils are equal, round, and reactive to light.   Neck:      Musculoskeletal: Neck supple. No neck rigidity.   Cardiovascular:      Rate and Rhythm: Regular rhythm. Bradycardia present. Occasional extrasystoles are present.     Chest Wall: PMI is displaced.      Pulses: Normal pulses.      Heart sounds: No murmur.   Pulmonary:      Effort: No accessory muscle usage or respiratory distress.      Breath sounds: Examination of the left-lower field reveals rhonchi. Rhonchi present. No wheezing.   Abdominal:      General: Bowel sounds are normal. There is no distension.      Palpations: Abdomen is soft.      Tenderness: There is no tenderness.   Musculoskeletal:         General: No tenderness.   Skin:     General: Skin is warm and dry.      Capillary Refill: Capillary refill takes less than 2 seconds.      Findings: No rash.   Neurological:      General: No focal deficit present.      Mental Status: He is alert. He is disoriented.      Cranial Nerves: No cranial nerve deficit.   Psychiatric:      Comments: Forgetful, calm         Medications  Current Facility-Administered Medications   Medication Dose Route Frequency Provider Last Rate Last Dose   • carvedilol (COREG) tablet 25 mg  25 mg Oral BID Kurtis Fugn D.O.   Stopped at 02/01/20 1800   • senna-docusate (PERICOLACE or SENOKOT S) 8.6-50 MG per tablet 2 Tab  2 Tab Oral BID TATUM Weston.JUANY   Stopped at 02/01/20 1800    And   • polyethylene glycol/lytes (MIRALAX) PACKET 1 Packet  1 Packet Oral QDAY PRN Kurtis Fung D.O.        And   • magnesium hydroxide (MILK OF MAGNESIA) suspension 30 mL  30 mL Oral QDAY PRN Kurtis Fung D.O.        And   • bisacodyl (DULCOLAX) suppository 10 mg  10 mg  Rectal QDAY PRN Kurtis Fung D.O.       • Respiratory Care per Protocol   Nebulization Continuous RT Kurtis Fung D.O.       • acetaminophen (TYLENOL) tablet 650 mg  650 mg Oral Q6HRS PRN TATUM Weston.O.       • ondansetron (ZOFRAN) syringe/vial injection 4 mg  4 mg Intravenous Q4HRS PRN TATUM Weston.O.       • ondansetron (ZOFRAN ODT) dispertab 4 mg  4 mg Oral Q4HRS PRN TATUM Weston.O.       • insulin regular human (HUMULIN/NOVOLIN R) 62.5 Units in  mL infusion per protocol  0-29 Units/hr Intravenous Continuous Kurtis Fung D.O.   Stopped at 02/02/20 0645   • DEXTROSE 10% BOLUS 125-250 mL  125-250 mL Intravenous PRN SID WestonO.       • heparin injection 5,000 Units  5,000 Units Subcutaneous Q8HRS TATUM Weston.OApril   5,000 Units at 02/02/20 0525   • dexmedetomidine (PRECEDEX) 400 mcg/100mL NS premix infusion  0.1-1.5 mcg/kg/hr Intravenous Continuous Jeremy M Gonda, M.D.   Stopped at 02/01/20 1837   • haloperidol lactate (HALDOL) injection 2-5 mg  2-5 mg Intravenous Q4HRS PRN Jeremy M Gonda, M.D.           Fluids    Intake/Output Summary (Last 24 hours) at 2/2/2020 0742  Last data filed at 2/2/2020 0600  Gross per 24 hour   Intake 5896.91 ml   Output 1000 ml   Net 4896.91 ml       Laboratory          Recent Labs     02/01/20  1154 02/01/20  1740 02/01/20  2327 02/02/20  0700   SODIUM  --  137 141 139   POTASSIUM  --  3.7 3.6 3.9   CHLORIDE  --  108 109 109   CO2  --  24 24 25   BUN  --  38* 34* 31*   CREATININE  --  1.65* 1.65* 1.55*   MAGNESIUM 2.0  --   --   --    CALCIUM  --  8.9 9.2 9.2     Recent Labs     02/01/20  1143 02/01/20  1740 02/01/20  2327 02/02/20  0700   ALTSGPT 13  --   --   --    ASTSGOT 11*  --   --   --    ALKPHOSPHAT 77  --   --   --    TBILIRUBIN 1.1  --   --   --    GLUCOSE 855* 330* 147* 204*     Recent Labs     02/01/20  1143   WBC 6.6   NEUTSPOLYS 77.20*   LYMPHOCYTES 11.40*   MONOCYTES  7.20   EOSINOPHILS 2.00   BASOPHILS 0.80   ASTSGOT 11*   ALTSGPT 13   ALKPHOSPHAT 77   TBILIRUBIN 1.1     Recent Labs     02/01/20  1143   RBC 4.25*   HEMOGLOBIN 12.8*   HEMATOCRIT 37.3*   PLATELETCT 227       Imaging  CT:    Reviewed    Assessment/Plan  * Hyperosmolar coma (HCC)  Assessment & Plan  Continue to titrate insulin drip  Close monitoring of glucose, electrolytes, fluid status  Eventual transition back to long and short acting insulin  Diabetic diet if passes swallow eval    Acute metabolic encephalopathy  Assessment & Plan  Likely metabolic/toxic,?  Dementia component  Consider MRI brain if no improvement in the next 24 to 48 hours  Continue to correct underlying metabolic disease  Reorient, maintain sleep/wake cycle  Titrate Precedex drip for agitation control as needed    BMI 36.0-36.9,adult- (present on admission)  Assessment & Plan  Behavioral and dietary modification for weight loss  RD consultation    Essential hypertension- (present on admission)  Assessment & Plan  Resume ACE inhibitor  Hold Coreg until bradycardia improves  Hold spironolactone/hydrochlorothiazide while hypovolemic    Type 2 diabetes mellitus without complication (HCC)- (present on admission)  Assessment & Plan  Out-of-control  Continue insulin drip  Diabetic diet and eventual diabetic education when appropriate    CKD (chronic kidney disease) stage 3, GFR 30-59 ml/min (HCC)- (present on admission)  Assessment & Plan  Acute on chronic kidney disease likely due to dehydration -improving with hydration  Continue fluids  Avoid nephrotoxins, monitor creatinine, urine output, electrolytes    Dyslipidemia- (present on admission)  Assessment & Plan  Diet controlled for now    Hypokalemia  Assessment & Plan  Repletion and monitor closely    SHAKA (obstructive sleep apnea)- (present on admission)  Assessment & Plan  Noncompliant with CPAP  Continue nocturnal oxygen and pulse oximetry       VTE:  Heparin  Ulcer: Not Indicated  Lines:  None    I have performed a physical exam and reviewed and updated ROS and Plan today (2/2/2020). In review of yesterday's note (2/1/2020), there are no changes except as documented above.     Patient remains critically ill today requiring active titration of his insulin drip as well as intermittent Precedex for agitation control. High risk of deterioration and worsening vital organ dysfunction and death without the above critical care interventions.    Discussed patient condition and risk of morbidity and/or mortality with Family, RN, RT, Pharmacy, Charge nurse / hot rounds and Patient  The patient remains critically ill.  Critical care time = 33 minutes in directly providing and coordinating critical care and extensive data review.  No time overlap and excludes procedures.

## 2020-02-02 NOTE — PROGRESS NOTES
Pt admitted from ER at 1445. VS and medication documented. Wife present during transfer. Pt very agitated with RASS +3/+4. Pt is not oriented to time, date, place, or self. Restraints continued.  attempting to contact son as patient's wife is also forgetful and requires frequent reorientation.

## 2020-02-02 NOTE — PROGRESS NOTES
"Pt's son [place] at bedside. This RN inquired about baseline mental status for patient. Son reported that he recently moved to the area to assist his parents and that on \"Tuesday\", patient reported to son that he was \"having spells\" and was having difficulty with memory. Son also reports that he will bring in patient's home medication list and CPAP machine if needed. Son took all patient's belongings home with patient's wife. Unit number provided to family for questions/concerns. Son's number updated in patient's chart.    "

## 2020-02-03 LAB
ANION GAP SERPL CALC-SCNC: 5 MMOL/L (ref 0–11.9)
ANION GAP SERPL CALC-SCNC: 8 MMOL/L (ref 0–11.9)
BUN SERPL-MCNC: 21 MG/DL (ref 8–22)
BUN SERPL-MCNC: 23 MG/DL (ref 8–22)
CALCIUM SERPL-MCNC: 8.8 MG/DL (ref 8.5–10.5)
CALCIUM SERPL-MCNC: 9.3 MG/DL (ref 8.5–10.5)
CHLORIDE SERPL-SCNC: 106 MMOL/L (ref 96–112)
CHLORIDE SERPL-SCNC: 109 MMOL/L (ref 96–112)
CO2 SERPL-SCNC: 24 MMOL/L (ref 20–33)
CO2 SERPL-SCNC: 25 MMOL/L (ref 20–33)
CREAT SERPL-MCNC: 1.58 MG/DL (ref 0.5–1.4)
CREAT SERPL-MCNC: 1.62 MG/DL (ref 0.5–1.4)
GLUCOSE BLD-MCNC: 100 MG/DL (ref 65–99)
GLUCOSE BLD-MCNC: 112 MG/DL (ref 65–99)
GLUCOSE BLD-MCNC: 159 MG/DL (ref 65–99)
GLUCOSE BLD-MCNC: 173 MG/DL (ref 65–99)
GLUCOSE BLD-MCNC: 231 MG/DL (ref 65–99)
GLUCOSE BLD-MCNC: 64 MG/DL (ref 65–99)
GLUCOSE BLD-MCNC: 79 MG/DL (ref 65–99)
GLUCOSE BLD-MCNC: 84 MG/DL (ref 65–99)
GLUCOSE BLD-MCNC: 85 MG/DL (ref 65–99)
GLUCOSE BLD-MCNC: 91 MG/DL (ref 65–99)
GLUCOSE BLD-MCNC: 93 MG/DL (ref 65–99)
GLUCOSE BLD-MCNC: 94 MG/DL (ref 65–99)
GLUCOSE BLD-MCNC: 96 MG/DL (ref 65–99)
GLUCOSE SERPL-MCNC: 79 MG/DL (ref 65–99)
GLUCOSE SERPL-MCNC: 92 MG/DL (ref 65–99)
POTASSIUM SERPL-SCNC: 3.6 MMOL/L (ref 3.6–5.5)
POTASSIUM SERPL-SCNC: 3.7 MMOL/L (ref 3.6–5.5)
SODIUM SERPL-SCNC: 138 MMOL/L (ref 135–145)
SODIUM SERPL-SCNC: 139 MMOL/L (ref 135–145)

## 2020-02-03 PROCEDURE — A9270 NON-COVERED ITEM OR SERVICE: HCPCS | Performed by: INTERNAL MEDICINE

## 2020-02-03 PROCEDURE — 700111 HCHG RX REV CODE 636 W/ 250 OVERRIDE (IP): Performed by: INTERNAL MEDICINE

## 2020-02-03 PROCEDURE — 80048 BASIC METABOLIC PNL TOTAL CA: CPT

## 2020-02-03 PROCEDURE — 700102 HCHG RX REV CODE 250 W/ 637 OVERRIDE(OP): Performed by: INTERNAL MEDICINE

## 2020-02-03 PROCEDURE — A9270 NON-COVERED ITEM OR SERVICE: HCPCS | Performed by: HOSPITALIST

## 2020-02-03 PROCEDURE — 700105 HCHG RX REV CODE 258: Performed by: INTERNAL MEDICINE

## 2020-02-03 PROCEDURE — 99233 SBSQ HOSP IP/OBS HIGH 50: CPT | Performed by: INTERNAL MEDICINE

## 2020-02-03 PROCEDURE — 700102 HCHG RX REV CODE 250 W/ 637 OVERRIDE(OP): Performed by: HOSPITALIST

## 2020-02-03 PROCEDURE — 82962 GLUCOSE BLOOD TEST: CPT | Mod: 91

## 2020-02-03 PROCEDURE — 770001 HCHG ROOM/CARE - MED/SURG/GYN PRIV*

## 2020-02-03 PROCEDURE — 51798 US URINE CAPACITY MEASURE: CPT

## 2020-02-03 PROCEDURE — 99232 SBSQ HOSP IP/OBS MODERATE 35: CPT | Performed by: HOSPITALIST

## 2020-02-03 PROCEDURE — 700111 HCHG RX REV CODE 636 W/ 250 OVERRIDE (IP): Performed by: HOSPITALIST

## 2020-02-03 RX ORDER — INSULIN GLARGINE 100 [IU]/ML
40 INJECTION, SOLUTION SUBCUTANEOUS 2 TIMES DAILY
Status: DISCONTINUED | OUTPATIENT
Start: 2020-02-03 | End: 2020-02-07 | Stop reason: HOSPADM

## 2020-02-03 RX ORDER — POTASSIUM CHLORIDE 20 MEQ/1
60 TABLET, EXTENDED RELEASE ORAL ONCE
Status: COMPLETED | OUTPATIENT
Start: 2020-02-03 | End: 2020-02-03

## 2020-02-03 RX ORDER — TAMSULOSIN HYDROCHLORIDE 0.4 MG/1
0.4 CAPSULE ORAL EVERY EVENING
Status: DISCONTINUED | OUTPATIENT
Start: 2020-02-03 | End: 2020-02-07 | Stop reason: HOSPADM

## 2020-02-03 RX ORDER — FINASTERIDE 5 MG/1
5 TABLET, FILM COATED ORAL DAILY
Status: DISCONTINUED | OUTPATIENT
Start: 2020-02-03 | End: 2020-02-07 | Stop reason: HOSPADM

## 2020-02-03 RX ADMIN — ENOXAPARIN SODIUM 40 MG: 100 INJECTION SUBCUTANEOUS at 16:00

## 2020-02-03 RX ADMIN — INSULIN GLARGINE 40 UNITS: 100 INJECTION, SOLUTION SUBCUTANEOUS at 11:51

## 2020-02-03 RX ADMIN — CARVEDILOL 25 MG: 25 TABLET, FILM COATED ORAL at 05:36

## 2020-02-03 RX ADMIN — HEPARIN SODIUM 5000 UNITS: 5000 INJECTION, SOLUTION INTRAVENOUS; SUBCUTANEOUS at 05:36

## 2020-02-03 RX ADMIN — TAMSULOSIN HYDROCHLORIDE 0.4 MG: 0.4 CAPSULE ORAL at 18:19

## 2020-02-03 RX ADMIN — INSULIN HUMAN 3 UNITS: 100 INJECTION, SOLUTION PARENTERAL at 18:21

## 2020-02-03 RX ADMIN — INSULIN HUMAN 4 UNITS: 100 INJECTION, SOLUTION PARENTERAL at 21:00

## 2020-02-03 RX ADMIN — POTASSIUM CHLORIDE 60 MEQ: 1500 TABLET, EXTENDED RELEASE ORAL at 11:47

## 2020-02-03 RX ADMIN — SODIUM CHLORIDE, POTASSIUM CHLORIDE, SODIUM LACTATE AND CALCIUM CHLORIDE: 600; 310; 30; 20 INJECTION, SOLUTION INTRAVENOUS at 05:39

## 2020-02-03 RX ADMIN — INSULIN HUMAN 3 UNITS: 100 INJECTION, SOLUTION PARENTERAL at 11:51

## 2020-02-03 RX ADMIN — CARVEDILOL 25 MG: 25 TABLET, FILM COATED ORAL at 18:19

## 2020-02-03 RX ADMIN — FINASTERIDE 5 MG: 5 TABLET, FILM COATED ORAL at 11:46

## 2020-02-03 RX ADMIN — INSULIN GLARGINE 40 UNITS: 100 INJECTION, SOLUTION SUBCUTANEOUS at 18:21

## 2020-02-03 ASSESSMENT — ENCOUNTER SYMPTOMS
CHILLS: 0
ABDOMINAL PAIN: 0
SHORTNESS OF BREATH: 0
HEADACHES: 0
BLURRED VISION: 0
MEMORY LOSS: 1
NAUSEA: 0
FEVER: 0
COUGH: 0
DIZZINESS: 0
NECK PAIN: 0
DIARRHEA: 0
SPEECH CHANGE: 0
BACK PAIN: 0

## 2020-02-03 NOTE — PROGRESS NOTES
Critical Care Progress Note    Date of admission  2/1/2020    Chief Complaint  69 y.o. male admitted 2/1/2020 with HONK, AMS    Hospital Course    69 y.o. male who presented 2/1/2020 with type 2 diabetes.  He was brought in by ambulance with his wife.  EMS was actually called because of his wife's back pain.  In the emergency department he was reportedly was found wandering around confused oriented x0.  He had a urgent CT of his head was unremarkable and he was noted to have a glucose of 850 with a normal serum bicarb and anion gap.  He was brought into administered in the emergency department and evaluation and treatment was begun.  ICU consultation was obtained for hyperosmolar state management and treatment.  No other history is available at time of dictation as patient cannot contribute and wife is being evaluated elsewhere in emergency department.      Interval Problem Update  Neuro: AOx1  HR: sinus saulo, SR  SBP: 100-140  Tmax: AF  GI: loose stools, DM diet  I/O: low UOP  Lines: PIVs  Resp: RA   Vte: TYRELL-->lovenox  PPI/H2:n/a  Antibx: n/a      Transitioned off drip yesterday  BG in 60s overnight, around midnight  Bladder scan  lantus 40/40 down from 74/52    Review of Systems  Review of Systems   Eyes: Negative for blurred vision.   Respiratory: Negative for shortness of breath.    Cardiovascular: Negative for chest pain.   Neurological: Negative for dizziness.        Vital Signs for last 24 hours   Temp:  [35.8 °C (96.5 °F)-36.2 °C (97.2 °F)] 35.8 °C (96.5 °F)  Pulse:  [55-84] 55  Resp:  [0-20] 9  BP: (109-157)/(46-72) 149/49  SpO2:  [89 %-97 %] 96 %    Respiratory Information for the last 24 hours   2 lpm n/c, h/o SHAKA but non-compliant with CPAP    Physical Exam   Physical Exam  Constitutional:       General: He is not in acute distress.     Appearance: Normal appearance.   HENT:      Head: Normocephalic and atraumatic.      Mouth/Throat:      Mouth: Mucous membranes are moist.   Eyes:      Pupils: Pupils  are equal, round, and reactive to light.   Neck:      Musculoskeletal: No neck rigidity.   Cardiovascular:      Rate and Rhythm: Normal rate and regular rhythm.      Pulses: Normal pulses.      Heart sounds: Normal heart sounds.   Pulmonary:      Effort: Pulmonary effort is normal.      Breath sounds: Normal breath sounds.   Abdominal:      General: Abdomen is flat. Bowel sounds are normal.      Palpations: Abdomen is soft.   Musculoskeletal:      Right lower leg: No edema.      Left lower leg: No edema.   Skin:     General: Skin is warm and dry.   Neurological:      General: No focal deficit present.      Mental Status: He is alert.      Comments: Oriented to self only   Psychiatric:         Mood and Affect: Mood normal.         Medications  Current Facility-Administered Medications   Medication Dose Route Frequency Provider Last Rate Last Dose   • enoxaparin (LOVENOX) inj 40 mg  40 mg Subcutaneous DAILY Isabelle Saenz M.D.   40 mg at 02/03/20 1600   • insulin glargine (LANTUS) injection 40 Units  40 Units Subcutaneous BID Isabelle Saenz M.D.   40 Units at 02/03/20 1821   • finasteride (PROSCAR) tablet 5 mg  5 mg Oral DAILY Isabelle Saenz M.D.   5 mg at 02/03/20 1146   • tamsulosin (FLOMAX) capsule 0.4 mg  0.4 mg Oral Q EVENING Isabelle Saenz M.D.   0.4 mg at 02/03/20 1819   • MD Alert...ICU Electrolyte Replacement per Pharmacy   Other PHARMACY TO DOSE Jeremy M Gonda, M.D.       • insulin regular (HUMULIN R) injection 3-14 Units  3-14 Units Subcutaneous 4X/DAY ACHS Jeremy M Gonda, M.D.   3 Units at 02/03/20 1821    And   • glucose 4 g chewable tablet 16 g  16 g Oral Q15 MIN PRN Jeremy M Gonda, M.D.        And   • DEXTROSE 10% BOLUS 250 mL  250 mL Intravenous Q15 MIN PRN Jeremy M Gonda, M.D.       • ziprasidone (GEODON) injection 20 mg  20 mg Intramuscular Q4HRS PRN Jeremy M Gonda, M.D.       • carvedilol (COREG) tablet 25 mg  25 mg Oral BID TATUM Weston.OApril   25 mg at 02/03/20 1819   •  senna-docusate (PERICOLACE or SENOKOT S) 8.6-50 MG per tablet 2 Tab  2 Tab Oral BID Kurtis Fung D.O.   Stopped at 02/01/20 1800    And   • polyethylene glycol/lytes (MIRALAX) PACKET 1 Packet  1 Packet Oral QDAY PRN Kurtis Fung D.O.        And   • magnesium hydroxide (MILK OF MAGNESIA) suspension 30 mL  30 mL Oral QDAY PRN Kurtis Fung D.O.        And   • bisacodyl (DULCOLAX) suppository 10 mg  10 mg Rectal QDAY PRN SID WestonO.       • Respiratory Care per Protocol   Nebulization Continuous RT TATUM Weston.STEPHAN.       • acetaminophen (TYLENOL) tablet 650 mg  650 mg Oral Q6HRS PRN TATUM Weston.O.       • ondansetron (ZOFRAN) syringe/vial injection 4 mg  4 mg Intravenous Q4HRS PRN Kurtis Fung D.O.       • ondansetron (ZOFRAN ODT) dispertab 4 mg  4 mg Oral Q4HRS PRN TATUM Weston.O.       • haloperidol lactate (HALDOL) injection 2-5 mg  2-5 mg Intravenous Q4HRS PRN Jeremy M Gonda, M.D.           Fluids    Intake/Output Summary (Last 24 hours) at 2/3/2020 1828  Last data filed at 2/3/2020 1600  Gross per 24 hour   Intake 1295 ml   Output 800 ml   Net 495 ml       Laboratory          Recent Labs     02/01/20  1154  02/02/20  1945 02/02/20  2350 02/03/20  0530   SODIUM  --    < > 138 138 139   POTASSIUM  --    < > 3.4* 3.7 3.6   CHLORIDE  --    < > 108 109 106   CO2  --    < > 26 24 25   BUN  --    < > 24* 23* 21   CREATININE  --    < > 1.56* 1.58* 1.62*   MAGNESIUM 2.0  --   --   --   --    CALCIUM  --    < > 8.8 8.8 9.3    < > = values in this interval not displayed.     Recent Labs     02/01/20  1143  02/02/20  1945 02/02/20  2350 02/03/20  0530   ALTSGPT 13  --   --   --   --    ASTSGOT 11*  --   --   --   --    ALKPHOSPHAT 77  --   --   --   --    TBILIRUBIN 1.1  --   --   --   --    GLUCOSE 855*   < > 140* 92 79    < > = values in this interval not displayed.     Recent Labs     02/01/20  1143   WBC 6.6   NEUTSPOLYS  77.20*   LYMPHOCYTES 11.40*   MONOCYTES 7.20   EOSINOPHILS 2.00   BASOPHILS 0.80   ASTSGOT 11*   ALTSGPT 13   ALKPHOSPHAT 77   TBILIRUBIN 1.1     Recent Labs     02/01/20  1143   RBC 4.25*   HEMOGLOBIN 12.8*   HEMATOCRIT 37.3*   PLATELETCT 227       Imaging  no new imaging    Assessment/Plan  * Hyperosmolar coma (HCC)  Assessment & Plan  Basal bolus insulin, now off drip  Hypoglycemic overnight, so decrease insulin dose    Acute metabolic encephalopathy  Assessment & Plan  Improving  Probably d/t HHS  If still confused in AM, consider neuro consult or further imaging    BMI 36.0-36.9,adult- (present on admission)  Assessment & Plan  Behavioral and dietary modification for weight loss  RD consultation    Essential hypertension- (present on admission)  Assessment & Plan  Resume ACE inhibitor  Hold Coreg until bradycardia improves  Hold spironolactone/hydrochlorothiazide while hypovolemic    CKD (chronic kidney disease) stage 3, GFR 30-59 ml/min ([facility])- (present on admission)  Assessment & Plan  Acute on chronic kidney disease likely due to dehydration -improving with hydration  Continue fluids  Avoid nephrotoxins, monitor creatinine, urine output, electrolytes    Dyslipidemia- (present on admission)  Assessment & Plan  Diet controlled for now    Hypokalemia  Assessment & Plan  Repletion and monitor closely    SHAKA (obstructive sleep apnea)- (present on admission)  Assessment & Plan  Noncompliant with CPAP  Continue nocturnal oxygen and pulse oximetry       VTE:  Heparin  Ulcer: Not Indicated  Lines: None    I have performed a physical exam and reviewed and updated ROS and Plan today (2/3/2020). In review of yesterday's note (2/2/2020), there are no changes except as documented above.     Care transferred to Dr. Quiles after rounds

## 2020-02-03 NOTE — PROGRESS NOTES
Pts BS check was 64 for 2100 BS check. This Rn checked a second time for confirmation. All insulin held, 1 four ounce OJ cup was given and BS rechecked 15 minutes after that came to 96. Another four ounce cup of OJ was given to pt and BS after 15 minutes came to 112. Dr. Harris updated, will check FSBS q 15 min for 1 hor, then once ever hour for one hour then q 4 hrs.  Pt also has not voided during nightshift. Per report pt only voided 200mL for the entire day shift. Bladder scan results of no urine in bladder were updated to Dr. Harris. Orders received to start LR gtt at 50mL/hr. Orders will be implemented.

## 2020-02-03 NOTE — PROGRESS NOTES
Pt wearing lap bet while sitting up in chair for safety because continuously attempts to stand without assistance despite frequent reminders and reorientation to call light. Pt able to remove lap belt.

## 2020-02-03 NOTE — PROGRESS NOTES
Monitor Summary:   SB-SR : 50-80's  0.20/0.08/0.42    12 hour chart check   2 RN skin check complete

## 2020-02-04 LAB
ANION GAP SERPL CALC-SCNC: 4 MMOL/L (ref 0–11.9)
BUN SERPL-MCNC: 18 MG/DL (ref 8–22)
CALCIUM SERPL-MCNC: 8.7 MG/DL (ref 8.5–10.5)
CHLORIDE SERPL-SCNC: 111 MMOL/L (ref 96–112)
CO2 SERPL-SCNC: 25 MMOL/L (ref 20–33)
CREAT SERPL-MCNC: 1.74 MG/DL (ref 0.5–1.4)
ERYTHROCYTE [DISTWIDTH] IN BLOOD BY AUTOMATED COUNT: 45.5 FL (ref 35.9–50)
GLUCOSE BLD-MCNC: 115 MG/DL (ref 65–99)
GLUCOSE BLD-MCNC: 135 MG/DL (ref 65–99)
GLUCOSE BLD-MCNC: 193 MG/DL (ref 65–99)
GLUCOSE BLD-MCNC: 196 MG/DL (ref 65–99)
GLUCOSE BLD-MCNC: 237 MG/DL (ref 65–99)
GLUCOSE SERPL-MCNC: 117 MG/DL (ref 65–99)
HCT VFR BLD AUTO: 32.6 % (ref 42–52)
HGB BLD-MCNC: 11.2 G/DL (ref 14–18)
MCH RBC QN AUTO: 30.6 PG (ref 27–33)
MCHC RBC AUTO-ENTMCNC: 34.4 G/DL (ref 33.7–35.3)
MCV RBC AUTO: 89.1 FL (ref 81.4–97.8)
PLATELET # BLD AUTO: 179 K/UL (ref 164–446)
PMV BLD AUTO: 9.8 FL (ref 9–12.9)
POTASSIUM SERPL-SCNC: 3.9 MMOL/L (ref 3.6–5.5)
RBC # BLD AUTO: 3.66 M/UL (ref 4.7–6.1)
SODIUM SERPL-SCNC: 140 MMOL/L (ref 135–145)
WBC # BLD AUTO: 4.3 K/UL (ref 4.8–10.8)

## 2020-02-04 PROCEDURE — 700102 HCHG RX REV CODE 250 W/ 637 OVERRIDE(OP): Performed by: HOSPITALIST

## 2020-02-04 PROCEDURE — A9270 NON-COVERED ITEM OR SERVICE: HCPCS | Performed by: HOSPITALIST

## 2020-02-04 PROCEDURE — 770006 HCHG ROOM/CARE - MED/SURG/GYN SEMI*

## 2020-02-04 PROCEDURE — 700102 HCHG RX REV CODE 250 W/ 637 OVERRIDE(OP): Performed by: INTERNAL MEDICINE

## 2020-02-04 PROCEDURE — 80048 BASIC METABOLIC PNL TOTAL CA: CPT

## 2020-02-04 PROCEDURE — 85027 COMPLETE CBC AUTOMATED: CPT

## 2020-02-04 PROCEDURE — 82962 GLUCOSE BLOOD TEST: CPT | Mod: 91

## 2020-02-04 PROCEDURE — 700111 HCHG RX REV CODE 636 W/ 250 OVERRIDE (IP): Performed by: INTERNAL MEDICINE

## 2020-02-04 PROCEDURE — 99232 SBSQ HOSP IP/OBS MODERATE 35: CPT | Performed by: HOSPITALIST

## 2020-02-04 PROCEDURE — A9270 NON-COVERED ITEM OR SERVICE: HCPCS | Performed by: INTERNAL MEDICINE

## 2020-02-04 RX ORDER — POTASSIUM CHLORIDE 20 MEQ/1
40 TABLET, EXTENDED RELEASE ORAL ONCE
Status: COMPLETED | OUTPATIENT
Start: 2020-02-04 | End: 2020-02-04

## 2020-02-04 RX ADMIN — INSULIN GLARGINE 40 UNITS: 100 INJECTION, SOLUTION SUBCUTANEOUS at 05:52

## 2020-02-04 RX ADMIN — POTASSIUM CHLORIDE 40 MEQ: 1500 TABLET, EXTENDED RELEASE ORAL at 10:11

## 2020-02-04 RX ADMIN — INSULIN HUMAN 3 UNITS: 100 INJECTION, SOLUTION PARENTERAL at 11:38

## 2020-02-04 RX ADMIN — ENOXAPARIN SODIUM 40 MG: 100 INJECTION SUBCUTANEOUS at 05:48

## 2020-02-04 RX ADMIN — FINASTERIDE 5 MG: 5 TABLET, FILM COATED ORAL at 05:48

## 2020-02-04 RX ADMIN — TAMSULOSIN HYDROCHLORIDE 0.4 MG: 0.4 CAPSULE ORAL at 17:33

## 2020-02-04 RX ADMIN — INSULIN GLARGINE 40 UNITS: 100 INJECTION, SOLUTION SUBCUTANEOUS at 20:17

## 2020-02-04 RX ADMIN — INSULIN HUMAN 4 UNITS: 100 INJECTION, SOLUTION PARENTERAL at 20:17

## 2020-02-04 RX ADMIN — CARVEDILOL 25 MG: 25 TABLET, FILM COATED ORAL at 05:48

## 2020-02-04 RX ADMIN — HALOPERIDOL LACTATE 5 MG: 5 INJECTION, SOLUTION INTRAMUSCULAR at 16:37

## 2020-02-04 RX ADMIN — CARVEDILOL 25 MG: 25 TABLET, FILM COATED ORAL at 17:33

## 2020-02-04 ASSESSMENT — ENCOUNTER SYMPTOMS
CHILLS: 0
COUGH: 0
NAUSEA: 0
SPUTUM PRODUCTION: 0
VOMITING: 0
PALPITATIONS: 0
HEMOPTYSIS: 0
ORTHOPNEA: 0
DIZZINESS: 0

## 2020-02-04 ASSESSMENT — PATIENT HEALTH QUESTIONNAIRE - PHQ9
2. FEELING DOWN, DEPRESSED, IRRITABLE, OR HOPELESS: NOT AT ALL
1. LITTLE INTEREST OR PLEASURE IN DOING THINGS: NOT AT ALL
1. LITTLE INTEREST OR PLEASURE IN DOING THINGS: NOT AT ALL
SUM OF ALL RESPONSES TO PHQ9 QUESTIONS 1 AND 2: 0
SUM OF ALL RESPONSES TO PHQ9 QUESTIONS 1 AND 2: 0

## 2020-02-04 ASSESSMENT — COGNITIVE AND FUNCTIONAL STATUS - GENERAL
HELP NEEDED FOR BATHING: A LITTLE
SUGGESTED CMS G CODE MODIFIER DAILY ACTIVITY: CK
MOVING FROM LYING ON BACK TO SITTING ON SIDE OF FLAT BED: A LITTLE
DRESSING REGULAR LOWER BODY CLOTHING: A LITTLE
DAILY ACTIVITIY SCORE: 18
PERSONAL GROOMING: A LITTLE
CLIMB 3 TO 5 STEPS WITH RAILING: A LITTLE
CLIMB 3 TO 5 STEPS WITH RAILING: A LITTLE
SUGGESTED CMS G CODE MODIFIER DAILY ACTIVITY: CK
SUGGESTED CMS G CODE MODIFIER MOBILITY: CK
TURNING FROM BACK TO SIDE WHILE IN FLAT BAD: A LITTLE
EATING MEALS: A LITTLE
MOVING TO AND FROM BED TO CHAIR: A LITTLE
STANDING UP FROM CHAIR USING ARMS: A LITTLE
WALKING IN HOSPITAL ROOM: A LITTLE
MOBILITY SCORE: 18
HELP NEEDED FOR BATHING: A LITTLE
PERSONAL GROOMING: A LITTLE
MOVING TO AND FROM BED TO CHAIR: A LITTLE
MOBILITY SCORE: 18
HELP NEEDED FOR BATHING: A LITTLE
EATING MEALS: A LITTLE
WALKING IN HOSPITAL ROOM: A LITTLE
DAILY ACTIVITIY SCORE: 18
WALKING IN HOSPITAL ROOM: A LITTLE
TOILETING: A LITTLE
PERSONAL GROOMING: A LITTLE
CLIMB 3 TO 5 STEPS WITH RAILING: A LITTLE
MOVING TO AND FROM BED TO CHAIR: A LITTLE
TURNING FROM BACK TO SIDE WHILE IN FLAT BAD: A LITTLE
DRESSING REGULAR LOWER BODY CLOTHING: A LITTLE
STANDING UP FROM CHAIR USING ARMS: A LITTLE
SUGGESTED CMS G CODE MODIFIER MOBILITY: CK
TOILETING: A LITTLE
SUGGESTED CMS G CODE MODIFIER DAILY ACTIVITY: CK
DRESSING REGULAR UPPER BODY CLOTHING: A LITTLE
EATING MEALS: A LITTLE
DRESSING REGULAR UPPER BODY CLOTHING: A LITTLE
TOILETING: A LITTLE
DAILY ACTIVITIY SCORE: 18
TURNING FROM BACK TO SIDE WHILE IN FLAT BAD: A LITTLE
DRESSING REGULAR LOWER BODY CLOTHING: A LITTLE
DRESSING REGULAR UPPER BODY CLOTHING: A LITTLE

## 2020-02-04 ASSESSMENT — LIFESTYLE VARIABLES
HOW MANY TIMES IN THE PAST YEAR HAVE YOU HAD 5 OR MORE DRINKS IN A DAY: 0
TOTAL SCORE: 0
EVER FELT BAD OR GUILTY ABOUT YOUR DRINKING: NO
AVERAGE NUMBER OF DAYS PER WEEK YOU HAVE A DRINK CONTAINING ALCOHOL: 0
EVER HAD A DRINK FIRST THING IN THE MORNING TO STEADY YOUR NERVES TO GET RID OF A HANGOVER: NO
TOTAL SCORE: 0
HAVE YOU EVER FELT YOU SHOULD CUT DOWN ON YOUR DRINKING: NO
ON A TYPICAL DAY WHEN YOU DRINK ALCOHOL HOW MANY DRINKS DO YOU HAVE: 0
HAVE PEOPLE ANNOYED YOU BY CRITICIZING YOUR DRINKING: NO
ALCOHOL_USE: NO
CONSUMPTION TOTAL: NEGATIVE
TOTAL SCORE: 0

## 2020-02-04 NOTE — PROGRESS NOTES
Report given to receiving RN family Larisa made aware via phone by this RN [facility]

## 2020-02-04 NOTE — CARE PLAN
Problem: Safety  Goal: Will remain free from falls  Outcome: PROGRESSING AS EXPECTED  Intervention: Assess risk factors for falls  Note:   Patient determined to be high fall risk and appropriate precautions implemented.      Problem: Communication  Goal: The ability to communicate needs accurately and effectively will improve  Outcome: PROGRESSING SLOWER THAN EXPECTED  Intervention: [place] patient and significant other/support system to call light to alert staff of needs  Note:   Patient is disoriented to time and more confused after waking up from sleeping. De-excalation and reorientation finds him calming rather quickly. Patient verbalizes correct use of call light and appears to know when to use it to get RN but has not actually used it yet.

## 2020-02-04 NOTE — PROGRESS NOTES
Hospital Medicine Daily Progress Note    Date of Service  2/3/2020    Chief Complaint  confused    Hospital Course    70yo M with DM, HTN, CKD brought in with EMS in ambulance for his wife's back pain but then patient found wandering in hospital confused and work up was concerning for hyperosmolar non-ketotic state.      Interval Problem Update  Daughter here with pt's wife.  Daughter states her mother has moderately advanced dementia. She is concerned that her father may be forgetting things as well.  The patient is aware he was in the hospital but initially told me the wrong date. He was aware he was confused and found wandering in the hospital.  Daughter states she is looking for assistance to help come check in on them during the day.    Consultants/Specialty  Intensivist    Code Status  FULL    Disposition  Transfer to medical floor    Review of Systems  Review of Systems   Constitutional: Negative for chills and fever.   HENT: Negative for congestion.    Respiratory: Negative for cough and shortness of breath.    Cardiovascular: Negative for chest pain and leg swelling.   Gastrointestinal: Negative for abdominal pain, diarrhea and nausea.   Genitourinary: Negative for dysuria and frequency.   Musculoskeletal: Negative for back pain and neck pain.   Neurological: Negative for dizziness, speech change and headaches.   Psychiatric/Behavioral: Positive for memory loss.        Physical Exam  Temp:  [35.8 °C (96.5 °F)-36.5 °C (97.7 °F)] 36.5 °C (97.7 °F)  Pulse:  [55-84] 58  Resp:  [0-26] 26  BP: (109-157)/(47-72) 149/49  SpO2:  [89 %-97 %] 93 %    Physical Exam  Vitals signs reviewed.   Constitutional:       Appearance: Normal appearance. He is not diaphoretic.   HENT:      Head: Normocephalic and atraumatic.      Nose: Nose normal.      Mouth/Throat:      Mouth: Mucous membranes are moist.   Eyes:      General: No scleral icterus.        Right eye: No discharge.         Left eye: No discharge.      Extraocular  Movements: Extraocular movements intact.      Conjunctiva/sclera: Conjunctivae normal.   Neck:      Musculoskeletal: No muscular tenderness.      Vascular: No carotid bruit.   Cardiovascular:      Rate and Rhythm: Normal rate and regular rhythm.      Pulses:           Radial pulses are 2+ on the right side and 2+ on the left side.        Dorsalis pedis pulses are 2+ on the right side and 2+ on the left side.      Heart sounds: No murmur.   Pulmonary:      Effort: Pulmonary effort is normal. No respiratory distress.      Breath sounds: Normal breath sounds. No wheezing or rales.   Abdominal:      General: Bowel sounds are normal. There is no distension.      Palpations: Abdomen is soft.   Musculoskeletal:         General: No swelling or tenderness.   Lymphadenopathy:      Cervical: No cervical adenopathy.   Skin:     Coloration: Skin is not jaundiced or pale.   Neurological:      General: No focal deficit present.      Mental Status: He is alert and oriented to person, place, and time. Mental status is at baseline.      Cranial Nerves: No cranial nerve deficit.   Psychiatric:         Mood and Affect: Mood normal.         Behavior: Behavior normal.         Fluids    Intake/Output Summary (Last 24 hours) at 2/3/2020 2202  Last data filed at 2/3/2020 1600  Gross per 24 hour   Intake 1295 ml   Output 800 ml   Net 495 ml       Laboratory  Recent Labs     02/01/20  1143   WBC 6.6   RBC 4.25*   HEMOGLOBIN 12.8*   HEMATOCRIT 37.3*   MCV 87.8   MCH 30.1   MCHC 34.3   RDW 43.8   PLATELETCT 227   MPV 10.3     Recent Labs     02/02/20  1945 02/02/20  2350 02/03/20  0530   SODIUM 138 138 139   POTASSIUM 3.4* 3.7 3.6   CHLORIDE 108 109 106   CO2 26 24 25   GLUCOSE 140* 92 79   BUN 24* 23* 21   CREATININE 1.56* 1.58* 1.62*   CALCIUM 8.8 8.8 9.3                   Imaging  CT-HEAD W/O   Final Result         1. No acute intracranial abnormality. No evidence of acute intracranial hemorrhage or mass lesion.                     Assessment/Plan  * Hyperosmolar coma (HCC)  Assessment & Plan  Improving mentation but still a concern of some confusion.  lantus 40 units   Monitor accuchecks and cover with SSI  Holding outpatient dulaglutide, glimepiride, mifepristone  Follows with outpatient endocrine    Acute metabolic encephalopathy  Assessment & Plan  Likely due to secondary to hyperosmolar nonketotic state  Transfer to medical floor  Improving cognition and may need cognitive eval.  Fell and hit his head ~1 month ago but doubt post-concussion syndrome but in the differential    BMI 36.0-36.9,adult- (present on admission)  Assessment & Plan  Body mass index is 29.21 kg/m².    Essential hypertension- (present on admission)  Assessment & Plan  Coreg 25mg BID  Held HCTZ, aldactone, lasix, enalapril due to renal function    CKD (chronic kidney disease) stage 3, GFR 30-59 ml/min ([facility])- (present on admission)  Assessment & Plan  CKD stage III,  BUN 21, Cr:1.62    Dyslipidemia- (present on admission)  Assessment & Plan  Healthy diet    Hypokalemia  Assessment & Plan  K:3.6  Monitor labs    SHAKA (obstructive sleep apnea)- (present on admission)  Assessment & Plan  Supplemental oxygen       VTE prophylaxis: enoxaparin

## 2020-02-04 NOTE — CARE PLAN
Problem: Knowledge Deficit  Goal: Knowledge of disease process/condition, treatment plan, diagnostic tests, and medications will improve  Note:   Cog eval, PT/OT for home safety     Problem: Discharge Barriers/Planning  Goal: Patient's continuum of care needs will be met  Note:   SW assistance if DC home

## 2020-02-04 NOTE — PROGRESS NOTES
Assumed care of patient at 1315. Report received from EDGAR Ponce. Patent oriented to room and call bell. Able to answer orientation questions. Bed locked and in the lowest position. Able to make needs known, call bell within reach. Will continue to monitor.

## 2020-02-04 NOTE — PROGRESS NOTES
· 2 RN skin check complete with EDGAR Cordova.  · Devices in place- IV.  · Skin assessed under devices- yes.  · Confirmed pressure ulcers found on-N/A    Skin intact.  · The following interventions are in place- patient able to move self in bed and ambulate without any issue.

## 2020-02-04 NOTE — PROGRESS NOTES
Ok to toss away own slippers per wife Nubia since stool landed on it on his way to [place] this am to poop.

## 2020-02-04 NOTE — PROGRESS NOTES
Aaox3, son in room, up steady to br, POC discussed, son concerned about home situation, SW coordination ordered, will discussed need for Cog eval and PT/OT with MD.

## 2020-02-05 LAB
FOLATE SERPL-MCNC: 11.2 NG/ML
GLUCOSE BLD-MCNC: 139 MG/DL (ref 65–99)
GLUCOSE BLD-MCNC: 165 MG/DL (ref 65–99)
GLUCOSE BLD-MCNC: 84 MG/DL (ref 65–99)
GLUCOSE BLD-MCNC: 98 MG/DL (ref 65–99)
TREPONEMA PALLIDUM IGG+IGM AB [PRESENCE] IN SERUM OR PLASMA BY IMMUNOASSAY: NON REACTIVE
TSH SERPL DL<=0.005 MIU/L-ACNC: 1.48 UIU/ML (ref 0.38–5.33)
VIT B12 SERPL-MCNC: 401 PG/ML (ref 211–911)

## 2020-02-05 PROCEDURE — 84443 ASSAY THYROID STIM HORMONE: CPT

## 2020-02-05 PROCEDURE — 99232 SBSQ HOSP IP/OBS MODERATE 35: CPT | Performed by: INTERNAL MEDICINE

## 2020-02-05 PROCEDURE — 700102 HCHG RX REV CODE 250 W/ 637 OVERRIDE(OP): Performed by: HOSPITALIST

## 2020-02-05 PROCEDURE — 82746 ASSAY OF FOLIC ACID SERUM: CPT

## 2020-02-05 PROCEDURE — 82607 VITAMIN B-12: CPT

## 2020-02-05 PROCEDURE — A9270 NON-COVERED ITEM OR SERVICE: HCPCS | Performed by: INTERNAL MEDICINE

## 2020-02-05 PROCEDURE — 700111 HCHG RX REV CODE 636 W/ 250 OVERRIDE (IP): Performed by: INTERNAL MEDICINE

## 2020-02-05 PROCEDURE — 36415 COLL VENOUS BLD VENIPUNCTURE: CPT

## 2020-02-05 PROCEDURE — 92523 SPEECH SOUND LANG COMPREHEN: CPT

## 2020-02-05 PROCEDURE — 770006 HCHG ROOM/CARE - MED/SURG/GYN SEMI*

## 2020-02-05 PROCEDURE — 86780 TREPONEMA PALLIDUM: CPT

## 2020-02-05 PROCEDURE — 82962 GLUCOSE BLOOD TEST: CPT

## 2020-02-05 PROCEDURE — A9270 NON-COVERED ITEM OR SERVICE: HCPCS | Performed by: HOSPITALIST

## 2020-02-05 PROCEDURE — 700102 HCHG RX REV CODE 250 W/ 637 OVERRIDE(OP): Performed by: INTERNAL MEDICINE

## 2020-02-05 RX ADMIN — CARVEDILOL 25 MG: 25 TABLET, FILM COATED ORAL at 18:01

## 2020-02-05 RX ADMIN — FINASTERIDE 5 MG: 5 TABLET, FILM COATED ORAL at 05:10

## 2020-02-05 RX ADMIN — INSULIN GLARGINE 40 UNITS: 100 INJECTION, SOLUTION SUBCUTANEOUS at 18:04

## 2020-02-05 RX ADMIN — CARVEDILOL 25 MG: 25 TABLET, FILM COATED ORAL at 05:10

## 2020-02-05 RX ADMIN — SENNOSIDES AND DOCUSATE SODIUM 2 TABLET: 8.6; 5 TABLET ORAL at 18:02

## 2020-02-05 RX ADMIN — INSULIN HUMAN 3 UNITS: 100 INJECTION, SOLUTION PARENTERAL at 20:20

## 2020-02-05 RX ADMIN — TAMSULOSIN HYDROCHLORIDE 0.4 MG: 0.4 CAPSULE ORAL at 18:01

## 2020-02-05 RX ADMIN — SENNOSIDES AND DOCUSATE SODIUM 2 TABLET: 8.6; 5 TABLET ORAL at 05:10

## 2020-02-05 RX ADMIN — ENOXAPARIN SODIUM 40 MG: 100 INJECTION SUBCUTANEOUS at 05:10

## 2020-02-05 ASSESSMENT — ENCOUNTER SYMPTOMS
EYE PAIN: 0
EYE REDNESS: 0
HEMOPTYSIS: 0
COUGH: 0
DIARRHEA: 0
INSOMNIA: 0
FEVER: 0
FOCAL WEAKNESS: 0
EYE DISCHARGE: 0
VOMITING: 0
NERVOUS/ANXIOUS: 0
BACK PAIN: 0
CHILLS: 0
WEIGHT LOSS: 0
NECK PAIN: 0
MYALGIAS: 0
PALPITATIONS: 0
BLURRED VISION: 0
DEPRESSION: 0
ABDOMINAL PAIN: 0
ORTHOPNEA: 0
SPUTUM PRODUCTION: 0
SEIZURES: 0
STRIDOR: 0
DIZZINESS: 0
HEADACHES: 0
SHORTNESS OF BREATH: 0
NAUSEA: 0
HEARTBURN: 0

## 2020-02-05 NOTE — PROGRESS NOTES
Pt is A&Ox4; pt able to answer orientation questions however, he's confused about the situation and keeps trying to leave. Pt is resting in bed, no signs of labored breathing or pain. Pt on RA. Call light & personal belongings within reach, bed in lowest position & locked, and bed alarm is on. Pt updated on plan of care for the day. Pt declines any additional needs at this time. Will continue to monitor.

## 2020-02-05 NOTE — DISCHARGE PLANNING
Care Transition Team Assessment    LSW reviewed chart and spoke with pt, daughter, and wife at bedside. Daughter verified that the facesheet is accurate. Daughter Kat confirmed that her parents living in a single story home by themselves. that her parents use the [facility] on [address]. Kat reports pt being independent in all ADLS/IADLS prior to this admission. Kat stated that the pt uses a CPAP machine at home.   Kat was asking about caregiving resources. LSW gave caregiving list at bedside.     Information Source  Orientation : Disoriented to Event  Information Given By: Other (Comments)(Daughter Bryant and spouse)  Informant's Name: Bryant   Who is responsible for making decisions for patient? : Legal next of kin  Name(s) of Primary Decision Maker: Arlet     Readmission Evaluation  Is this a readmission?: No    Elopement Risk  Legal Hold: No  Ambulatory or Self Mobile in Wheelchair: Yes  Disoriented: Place-At Risk for Elopement, Time-At Risk for Elopement, Situation-At Risk for Elopement  Elopement Risk: At Risk for Elopement  Wanderguard On: Yes(right wrist)  Personal Belongings: Hospital Clothing Only  Environmental Precautions: Sharp or Dangerous Items Removed  Picture of Patient on Inside Chart Front Cover: No (See Comments)  Purple Armband on Patient: No (See Comments)    Interdisciplinary Discharge Planning  Lives with - Patient's Self Care Capacity: Spouse  Patient or legal guardian wants to designate a caregiver (see row info): No    Discharge Preparedness  What is your plan after discharge?: Uncertain - pending medical team collaboration  What are your discharge supports?: Child  Prior Functional Level: Ambulatory, Drives Self, Independent with Activities of Daily Living, Independent with Medication Management  Difficulity with ADLs: None  Difficulity with IADLs: None    Functional Assesment  Prior Functional Level: Ambulatory, Drives Self, Independent with Activities of Daily  Living, Independent with Medication Management    Finances  Financial Barriers to Discharge: No  Prescription Coverage: Yes              Advance Directive  Advance Directive?: None    Domestic Abuse  Have you ever been the victim of abuse or violence?: No  Physical Abuse or Sexual Abuse: No  Verbal Abuse or Emotional Abuse: No  Possible Abuse Reported to:: Not Applicable    Psychological Assessment  History of Substance Abuse: None  History of Psychiatric Problems: No  Non-compliant with Treatment: No  Newly Diagnosed Illness: No    Discharge Risks or Barriers  Discharge risks or barriers?: Other (comment)  Patient risk factors: Vulnerable adult    Anticipated Discharge Information  Anticipated discharge disposition: Discharge needs currently unknown  Discharge Address: [address] [place] [address] [place] [id number]  Discharge Contact Phone Number: [phone]

## 2020-02-05 NOTE — DISCHARGE PLANNING
Anticipated Discharge Disposition: TBD     Action: LSW called pt' son and left vm     Barriers to Discharge: none     Plan: LSW will continue to assess for any discharge needs.

## 2020-02-05 NOTE — PROGRESS NOTES
Assumed care of pt. Pt is confused about situation but able to answer orientation question. Pt sitting up at edge of bed. No signs of labored breathing or pain. Pt on RA. Bed alarm on. Wanderguard on right wrist & active. Fall precautions in place and education provided on calling for assistance. Pt in room next to nurses station for elopement precaution. Pt updated on plan of care for the day. Pt declines any additional needs at this time. Will continue to monitor.

## 2020-02-05 NOTE — THERAPY
"  Speech Language Therapy Evaluation completed to address cognition  Functional Status:  Pt seen on this date for a cognitive-linguistic evaluation. Pt A&Ox3 with disorientation to event. Per pt, no hx of cognitive deficits, however, pt does not appear to be a reliable historian. The Cognitive Linguistic Quick Test (CLQT) was administered in full and pt received an overall composite severity score of 2.6 which correlates with an overall \"mild\" cognitive impairment. Moderate deficits were found in memory (96) and language (24) and mild deficits found in attention (112), executive functioning (14), and visuospatial skills (45). Severe deficits found in clock drawing task as pt unable to write numbers inside clock (writing numbers such as 12/30, 124, 12/45, 12/10 and unable to explain what those numbers meant) and unable to correctly dictate time (wrote out 11/10 when asked for pt to identify ten minutes after eleven). Non-standardized measures were used to assess an array of cognitive domains which found moderate deficits in functional problem solving and severe deficits in following written directions and thought organization. Pt with poor insight into deficits and when asked if he feels like he's back to his baseline he stated \"I haven't noticed any difference.\" Unable to determine pt's cognitive baseline as his family was not present at bedside, however, pt reported that he lives with his wife and both he and his wife are retired. At this time, pt would benefit from cognitive speech therapy to address deficits found during the evaluation and pt would benefit from 24 hour supervision to ensure safety. SLP to follow.    Recommendations:  Pt would benefit from cognitive speech therapy to address deficits found during the evaluation and pt would benefit from 24 hour supervision to ensure safety  Plan of Care: Will benefit from Speech Therapy 3 times per week  Post-Acute Therapy: Recommend inpatient transitional care " "services for continued speech therapy services.        See \"Rehab Therapy-Acute\" Patient Summary Report for complete documentation.     "

## 2020-02-05 NOTE — CARE PLAN
Problem: Communication  Goal: The ability to communicate needs accurately and effectively will improve  Outcome: PROGRESSING AS EXPECTED  Intervention: Educate patient and significant other/support system about the plan of care, procedures, treatments, medications and allow for questions  Flowsheets (Taken 2/4/2020 1655)  Pt & Family Have Been Educated on Methods Available to Report Concerns Related to Care, Treatment, Services, and Patient Safety Issues: Yes     Problem: Safety  Goal: Will remain free from injury  Outcome: PROGRESSING AS EXPECTED  Intervention: Collaborate with Interdisciplinary Team for safe transfer and mobilization techniques  Flowsheets (Taken 2/4/2020 1600)  Assistive Devices: None  Goal: Will remain free from falls  Outcome: PROGRESSING AS EXPECTED  Intervention: Assess risk factors for falls  Flowsheets (Taken 2/4/2020 1600)  Pt Calls for Assistance: No

## 2020-02-05 NOTE — WOUND TEAM
In to see patient for wound consult of buttocks.  Patient has partial thickness wound to left buttocks.  Patient states he rubbed his buttocks on the lise.  Does not appear pressure related.  Patient up self.  Gave barrier paste to assist with friction/sheer/moisture.  No advanced wound care needs at this time.

## 2020-02-05 NOTE — PROGRESS NOTES
Alert x3, bed alarm in place, wander guard in place to right wrist. Cooperative tonight. Cont plan of care, call light within reach and visual checks through the night    Woke up disoriented, wandering around the unit and trying to leave in the elevators

## 2020-02-05 NOTE — CARE PLAN
Problem: Safety  Goal: Will remain free from injury  Outcome: PROGRESSING AS EXPECTED  Intervention: Collaborate with Interdisciplinary Team for safe transfer and mobilization techniques  Flowsheets (Taken 2/5/2020 0900)  Assistive Devices: None  Goal: Will remain free from falls  Outcome: PROGRESSING AS EXPECTED  Intervention: Assess risk factors for falls  Flowsheets (Taken 2/5/2020 0900)  Pt Calls for Assistance: No     Problem: Communication  Goal: The ability to communicate needs accurately and effectively will improve  Outcome: PROGRESSING SLOWER THAN EXPECTED  Intervention: Educate patient and significant other/support system about the plan of care, procedures, treatments, medications and allow for questions  Flowsheets (Taken 2/4/2020 1655)  Pt & Family Have Been Educated on Methods Available to Report Concerns Related to Care, Treatment, Services, and Patient Safety Issues: Yes

## 2020-02-05 NOTE — CARE PLAN
Problem: Safety  Goal: Will remain free from injury  Outcome: PROGRESSING AS EXPECTED  Bed alarm and wander guard in place  Problem: Knowledge Deficit  Goal: Knowledge of disease process/condition, treatment plan, diagnostic tests, and medications will improve  Outcome: PROGRESSING AS EXPECTED  Reorient to situation

## 2020-02-05 NOTE — PROGRESS NOTES
2 RN skin check completed with Kermit HERNÁNDEZ.   Devices in place: IV.  Skin assessed under devices: yes.  Confirmed pressure ulcers found on: n/a.  New potential pressure ulcers noted on: none. Wound consult placed: n/a.  The following interventions in place: moisturizer in use, pillows being used for positioning, atmosair mattress in use.     Skin: clean, dry, intact, and blanching.   General bruising bilateral arms. Scars on lower umbilicus region of abdomen and lower spine from previous surgeries.

## 2020-02-05 NOTE — PROGRESS NOTES
Hospital Medicine Daily Progress Note    Date of Service  2/4/2020    Chief Complaint  confused    Hospital Course    70yo M with DM, HTN, CKD brought in with EMS in ambulance for his wife's back pain but then patient found wandering in hospital confused and work up was concerning for hyperosmolar non-ketotic state.      Interval Problem Update  The patient is awake, he is cooperative  Does not have specific complaints, says that he wants to go home  Denies abdominal pain  He is oriented to himself, and the year  He does not recall the name of the hospital and seems unable to recall recent events    Consultants/Specialty  Intensivist    Code Status  FULL    Disposition  Transfer to medical floor    Review of Systems  Review of Systems   Constitutional: Negative for chills and malaise/fatigue.   Respiratory: Negative for cough, hemoptysis and sputum production.    Cardiovascular: Negative for chest pain, palpitations and orthopnea.   Gastrointestinal: Negative for nausea and vomiting.   Skin: Negative for itching and rash.   Neurological: Negative for dizziness.   All other systems reviewed and are negative.       Physical Exam  Temp:  [36.3 °C (97.4 °F)-36.5 °C (97.7 °F)] 36.4 °C (97.5 °F)  Pulse:  [58-71] 59  Resp:  [18-26] 18  BP: (118-148)/(37-81) 118/81  SpO2:  [93 %-100 %] 100 %    Physical Exam  Vitals signs reviewed.   Constitutional:       Appearance: Normal appearance. He is not diaphoretic.   HENT:      Head: Normocephalic and atraumatic.      Nose: Nose normal.      Mouth/Throat:      Mouth: Mucous membranes are moist.   Eyes:      General: No scleral icterus.        Right eye: No discharge.         Left eye: No discharge.      Extraocular Movements: Extraocular movements intact.      Conjunctiva/sclera: Conjunctivae normal.   Neck:      Musculoskeletal: No muscular tenderness.      Vascular: No carotid bruit.   Cardiovascular:      Rate and Rhythm: Normal rate and regular rhythm.      Pulses:            Radial pulses are 2+ on the right side and 2+ on the left side.        Dorsalis pedis pulses are 2+ on the right side and 2+ on the left side.      Heart sounds: No murmur.   Pulmonary:      Effort: Pulmonary effort is normal. No respiratory distress.      Breath sounds: Normal breath sounds. No wheezing or rales.   Abdominal:      General: Bowel sounds are normal. There is no distension.      Palpations: Abdomen is soft.   Musculoskeletal:         General: No swelling or tenderness.   Lymphadenopathy:      Cervical: No cervical adenopathy.   Skin:     Coloration: Skin is not jaundiced or pale.   Neurological:      General: No focal deficit present.      Mental Status: He is alert. Mental status is at baseline.      Cranial Nerves: No cranial nerve deficit.      Comments: Oriented to person and year, gets confused on the name of the hospital and exact   Psychiatric:         Mood and Affect: Mood normal.         Behavior: Behavior normal.         Fluids    Intake/Output Summary (Last 24 hours) at 2/4/2020 1606  Last data filed at 2/4/2020 0600  Gross per 24 hour   Intake 360 ml   Output --   Net 360 ml       Laboratory  Recent Labs     02/04/20  0354   WBC 4.3*   RBC 3.66*   HEMOGLOBIN 11.2*   HEMATOCRIT 32.6*   MCV 89.1   MCH 30.6   MCHC 34.4   RDW 45.5   PLATELETCT 179   MPV 9.8     Recent Labs     02/02/20  2350 02/03/20  0530 02/04/20  0354   SODIUM 138 139 140   POTASSIUM 3.7 3.6 3.9   CHLORIDE 109 106 111   CO2 24 25 25   GLUCOSE 92 79 117*   BUN 23* 21 18   CREATININE 1.58* 1.62* 1.74*   CALCIUM 8.8 9.3 8.7                   Imaging  CT-HEAD W/O   Final Result         1. No acute intracranial abnormality. No evidence of acute intracranial hemorrhage or mass lesion.                    Assessment/Plan  * Hyperosmolar coma (HCC)  Assessment & Plan  HHS has resolved, continue Lantus and insulin sliding scale for diabetes    Acute metabolic encephalopathy  Assessment & Plan  Likely due to secondary to hyperosmolar  nonketotic state, query some chronic cognitive deficit  The patient is cooperative and pleasant, but confused  I will speak with the patient's daughter and wife, they are expected to visit this afternoon  Cognitive eval ordered    Essential hypertension- (present on admission)  Assessment & Plan  Coreg 25mg BID    CKD (chronic kidney disease) stage 3, GFR 30-59 ml/min ([facility])- (present on admission)  Assessment & Plan  CKD stage III  Near baseline, avoid nephrotoxins    Hypokalemia  Assessment & Plan  Resolved    SHAKA (obstructive sleep apnea)- (present on admission)  Assessment & Plan  Supplemental oxygen       VTE prophylaxis: enoxaparin

## 2020-02-05 NOTE — PROGRESS NOTES
Hospital Medicine Daily Progress Note    Date of Service  2/5/2020    Chief Complaint  confused    Hospital Course    70yo M with DM, HTN, CKD brought in with EMS in ambulance for his wife's back pain but then patient found wandering in hospital confused and work up was concerning for hyperosmolar non-ketotic state.      Interval Problem Update  The patient was just transferred here to medical floor overnight from ICU.  He is very pleasant and able to answer question appropriately.  He lives with his wife and 2 children in [place].  No acute issue overnight. Patient was seen and examined by me today. Case was discussed with RN and multidisplinary team during rounds. Denies nausea, vomiting, diarrhea.       Consultants/Specialty  Intensivist    Code Status  FULL    Disposition  Remain on medical floor    Review of Systems  Review of Systems   Constitutional: Negative for chills, fever, malaise/fatigue and weight loss.   HENT: Negative for congestion and nosebleeds.    Eyes: Negative for blurred vision, pain, discharge and redness.   Respiratory: Negative for cough, hemoptysis, sputum production, shortness of breath and stridor.    Cardiovascular: Negative for chest pain, palpitations and orthopnea.   Gastrointestinal: Negative for abdominal pain, diarrhea, heartburn, nausea and vomiting.   Genitourinary: Negative for dysuria, frequency and urgency.   Musculoskeletal: Negative for back pain, myalgias and neck pain.   Skin: Negative for itching and rash.   Neurological: Negative for dizziness, focal weakness, seizures and headaches.   Psychiatric/Behavioral: Negative for depression. The patient is not nervous/anxious and does not have insomnia.         Physical Exam  Temp:  [36.3 °C (97.3 °F)-36.8 °C (98.2 °F)] 36.7 °C (98 °F)  Pulse:  [59-67] 62  Resp:  [16-18] 16  BP: (105-150)/(45-81) 105/51  SpO2:  [95 %-100 %] 98 %    Physical Exam  Vitals signs reviewed.   Constitutional:       General: He is not in acute  distress.     Appearance: Normal appearance. He is not diaphoretic.   HENT:      Head: Normocephalic and atraumatic.      Nose: Nose normal. No congestion or rhinorrhea.      Mouth/Throat:      Mouth: Mucous membranes are moist.   Eyes:      General: No scleral icterus.        Right eye: No discharge.         Left eye: No discharge.      Extraocular Movements: Extraocular movements intact.      Conjunctiva/sclera: Conjunctivae normal.      Pupils: Pupils are equal, round, and reactive to light.   Neck:      Musculoskeletal: Normal range of motion and neck supple. No muscular tenderness.      Vascular: No carotid bruit.   Cardiovascular:      Rate and Rhythm: Normal rate and regular rhythm.      Pulses: Normal pulses.           Radial pulses are 2+ on the right side and 2+ on the left side.        Dorsalis pedis pulses are 2+ on the right side and 2+ on the left side.      Heart sounds: No murmur.   Pulmonary:      Effort: Pulmonary effort is normal. No respiratory distress.      Breath sounds: Normal breath sounds. No wheezing or rales.   Abdominal:      General: Bowel sounds are normal. There is no distension.      Palpations: Abdomen is soft.      Tenderness: There is no tenderness.   Musculoskeletal:         General: No swelling or tenderness.   Lymphadenopathy:      Cervical: No cervical adenopathy.   Skin:     General: Skin is warm.      Coloration: Skin is not jaundiced or pale.      Findings: No erythema.   Neurological:      General: No focal deficit present.      Mental Status: He is alert and oriented to person, place, and time. Mental status is at baseline.      Cranial Nerves: No cranial nerve deficit.      Comments: Oriented to person and year, gets confused on the name of the hospital and exact   Psychiatric:         Mood and Affect: Mood normal.         Behavior: Behavior normal.         Fluids  No intake or output data in the 24 hours ending 02/05/20 0824    Laboratory  Recent Labs     02/04/20  0222    WBC 4.3*   RBC 3.66*   HEMOGLOBIN 11.2*   HEMATOCRIT 32.6*   MCV 89.1   MCH 30.6   MCHC 34.4   RDW 45.5   PLATELETCT 179   MPV 9.8     Recent Labs     02/02/20  2350 02/03/20  0530 02/04/20  0354   SODIUM 138 139 140   POTASSIUM 3.7 3.6 3.9   CHLORIDE 109 106 111   CO2 24 25 25   GLUCOSE 92 79 117*   BUN 23* 21 18   CREATININE 1.58* 1.62* 1.74*   CALCIUM 8.8 9.3 8.7                   Imaging  CT-HEAD W/O   Final Result         1. No acute intracranial abnormality. No evidence of acute intracranial hemorrhage or mass lesion.                    Assessment/Plan  * Hyperosmolar coma (HCC)  Assessment & Plan  HHS has resolved, continue Lantus and insulin sliding scale for diabetes    Acute metabolic encephalopathy  Assessment & Plan  Likely due to secondary to hyperosmolar nonketotic state  improving  We will talk to family member about whether he is at baseline or not.  Cognitive eval     Essential hypertension- (present on admission)  Assessment & Plan  Coreg 25mg BID    CKD (chronic kidney disease) stage 3, GFR 30-59 ml/min ([facility])- (present on admission)  Assessment & Plan  CKD stage III  Near baseline, avoid nephrotoxins    Hypokalemia  Assessment & Plan  Resolved    SHAKA (obstructive sleep apnea)- (present on admission)  Assessment & Plan  Supplemental oxygen       VTE prophylaxis: enoxaparin

## 2020-02-06 LAB
GLUCOSE BLD-MCNC: 124 MG/DL (ref 65–99)
GLUCOSE BLD-MCNC: 137 MG/DL (ref 65–99)
GLUCOSE BLD-MCNC: 176 MG/DL (ref 65–99)
GLUCOSE BLD-MCNC: 212 MG/DL (ref 65–99)

## 2020-02-06 PROCEDURE — 700102 HCHG RX REV CODE 250 W/ 637 OVERRIDE(OP): Performed by: INTERNAL MEDICINE

## 2020-02-06 PROCEDURE — A9270 NON-COVERED ITEM OR SERVICE: HCPCS | Performed by: HOSPITALIST

## 2020-02-06 PROCEDURE — 770006 HCHG ROOM/CARE - MED/SURG/GYN SEMI*

## 2020-02-06 PROCEDURE — 700102 HCHG RX REV CODE 250 W/ 637 OVERRIDE(OP): Performed by: HOSPITALIST

## 2020-02-06 PROCEDURE — 99232 SBSQ HOSP IP/OBS MODERATE 35: CPT | Performed by: INTERNAL MEDICINE

## 2020-02-06 PROCEDURE — A9270 NON-COVERED ITEM OR SERVICE: HCPCS | Performed by: INTERNAL MEDICINE

## 2020-02-06 PROCEDURE — 82962 GLUCOSE BLOOD TEST: CPT | Mod: 91

## 2020-02-06 PROCEDURE — 700111 HCHG RX REV CODE 636 W/ 250 OVERRIDE (IP): Performed by: INTERNAL MEDICINE

## 2020-02-06 RX ADMIN — CARVEDILOL 25 MG: 25 TABLET, FILM COATED ORAL at 05:27

## 2020-02-06 RX ADMIN — INSULIN GLARGINE 40 UNITS: 100 INJECTION, SOLUTION SUBCUTANEOUS at 17:02

## 2020-02-06 RX ADMIN — CARVEDILOL 25 MG: 25 TABLET, FILM COATED ORAL at 17:00

## 2020-02-06 RX ADMIN — TAMSULOSIN HYDROCHLORIDE 0.4 MG: 0.4 CAPSULE ORAL at 16:59

## 2020-02-06 RX ADMIN — ENOXAPARIN SODIUM 40 MG: 100 INJECTION SUBCUTANEOUS at 05:28

## 2020-02-06 RX ADMIN — INSULIN HUMAN 3 UNITS: 100 INJECTION, SOLUTION PARENTERAL at 11:09

## 2020-02-06 RX ADMIN — FINASTERIDE 5 MG: 5 TABLET, FILM COATED ORAL at 05:27

## 2020-02-06 RX ADMIN — INSULIN GLARGINE 40 UNITS: 100 INJECTION, SOLUTION SUBCUTANEOUS at 05:31

## 2020-02-06 ASSESSMENT — ENCOUNTER SYMPTOMS
CHILLS: 0
SEIZURES: 0
MYALGIAS: 0
VOMITING: 0
WEIGHT LOSS: 0
DEPRESSION: 0
BACK PAIN: 0
FOCAL WEAKNESS: 0
HEADACHES: 0
ORTHOPNEA: 0
COUGH: 0
HEMOPTYSIS: 0
PALPITATIONS: 0
SPUTUM PRODUCTION: 0
NECK PAIN: 0
STRIDOR: 0
SHORTNESS OF BREATH: 0
NERVOUS/ANXIOUS: 0
ABDOMINAL PAIN: 0
EYE DISCHARGE: 0
NAUSEA: 0
DIARRHEA: 0
EYE REDNESS: 0
EYE PAIN: 0
INSOMNIA: 0

## 2020-02-06 NOTE — PROGRESS NOTES
"Wife at bedside. Asked wife if patient is currently at baseline, states that she feels \"pt is much better right now cognitively than he is at baseline.\"   "

## 2020-02-06 NOTE — PROGRESS NOTES
Received report from night shift and assumed care. Assessment completed, POC discussed. Pt is A&OX4, denies pain or discomfort. Currently sitting at edge of bed. Pt has wanderguard in place to right wrist as he becomes confused and wanders at times. All other needs met. Safety precautions and hourly rounding in place.

## 2020-02-06 NOTE — PROGRESS NOTES
Hospital Medicine Daily Progress Note    Date of Service  2/6/2020    Chief Complaint  confused    Hospital Course    70yo M with DM, HTN, CKD brought in with EMS in ambulance for his wife's back pain but then patient found wandering in hospital confused and work up was concerning for hyperosmolar non-ketotic state.      Interval Problem Update  No acute issue overnight.  Patient is sleeping comfortably on the bed. I called Kristine (son) and left a message.   Patient was seen and examined by me today. Case was discussed with RN and multidisplinary team during rounds. Denies nausea, vomiting, diarrhea.       Consultants/Specialty  Intensivist    Code Status  FULL    Disposition  Remain on medical floor    Review of Systems  Review of Systems   Constitutional: Negative for chills, malaise/fatigue and weight loss.   HENT: Negative for congestion and nosebleeds.    Eyes: Negative for pain, discharge and redness.   Respiratory: Negative for cough, hemoptysis, sputum production, shortness of breath and stridor.    Cardiovascular: Negative for palpitations and orthopnea.   Gastrointestinal: Negative for abdominal pain, diarrhea, nausea and vomiting.   Genitourinary: Negative for frequency and urgency.   Musculoskeletal: Negative for back pain, myalgias and neck pain.   Skin: Negative for itching and rash.   Neurological: Negative for focal weakness, seizures and headaches.   Psychiatric/Behavioral: Negative for depression. The patient is not nervous/anxious and does not have insomnia.         Physical Exam  Temp:  [36.6 °C (97.8 °F)-37.2 °C (99 °F)] 36.6 °C (97.8 °F)  Pulse:  [64-70] 65  Resp:  [16-17] 16  BP: (102-157)/() 102/62  SpO2:  [96 %-100 %] 100 %    Physical Exam  Vitals signs reviewed.   Constitutional:       General: He is not in acute distress.     Appearance: Normal appearance. He is not diaphoretic.   HENT:      Head: Normocephalic.      Nose: Nose normal. No congestion or rhinorrhea.      Mouth/Throat:       Mouth: Mucous membranes are moist.   Eyes:      General: No scleral icterus.        Right eye: No discharge.         Left eye: No discharge.      Extraocular Movements: Extraocular movements intact.      Conjunctiva/sclera: Conjunctivae normal.      Pupils: Pupils are equal, round, and reactive to light.   Neck:      Musculoskeletal: Normal range of motion and neck supple. No muscular tenderness.      Vascular: No carotid bruit.   Cardiovascular:      Rate and Rhythm: Normal rate.      Pulses: Normal pulses.           Radial pulses are 2+ on the right side and 2+ on the left side.        Dorsalis pedis pulses are 2+ on the right side and 2+ on the left side.   Pulmonary:      Effort: Pulmonary effort is normal. No respiratory distress.      Breath sounds: Normal breath sounds. No wheezing or rales.   Abdominal:      General: Bowel sounds are normal. There is no distension.      Palpations: Abdomen is soft.      Tenderness: There is no tenderness.   Musculoskeletal:         General: No swelling or tenderness.   Lymphadenopathy:      Cervical: No cervical adenopathy.   Skin:     Coloration: Skin is not jaundiced or pale.   Neurological:      General: No focal deficit present.      Mental Status: He is alert.      Comments: Oriented to person and year, gets confused on the name of the hospital and exact         Fluids    Intake/Output Summary (Last 24 hours) at 2/6/2020 0913  Last data filed at 2/5/2020 1229  Gross per 24 hour   Intake 400 ml   Output --   Net 400 ml       Laboratory  Recent Labs     02/04/20  0354   WBC 4.3*   RBC 3.66*   HEMOGLOBIN 11.2*   HEMATOCRIT 32.6*   MCV 89.1   MCH 30.6   MCHC 34.4   RDW 45.5   PLATELETCT 179   MPV 9.8     Recent Labs     02/04/20  0354   SODIUM 140   POTASSIUM 3.9   CHLORIDE 111   CO2 25   GLUCOSE 117*   BUN 18   CREATININE 1.74*   CALCIUM 8.7                   Imaging  CT-HEAD W/O   Final Result         1. No acute intracranial abnormality. No evidence of acute  intracranial hemorrhage or mass lesion.                    Assessment/Plan  * Hyperosmolar coma (HCC)  Assessment & Plan  HHS has resolved, continue Lantus and insulin sliding scale for diabetes    Acute metabolic encephalopathy  Assessment & Plan  Likely due to secondary to hyperosmolar nonketotic state  improving  Left a message to the son  Cognitive eval     Essential hypertension- (present on admission)  Assessment & Plan  Coreg 25mg BID    CKD (chronic kidney disease) stage 3, GFR 30-59 ml/min ([facility])- (present on admission)  Assessment & Plan  CKD stage III  Near baseline, avoid nephrotoxins    Hypokalemia  Assessment & Plan  Resolved    SHAKA (obstructive sleep apnea)- (present on admission)  Assessment & Plan  Supplemental oxygen       VTE prophylaxis: enoxaparin

## 2020-02-06 NOTE — PROGRESS NOTES
Alert x3, ambulated around the unit, wander guard remains in place to right wrist. Provided his wife's number as requested. Cont plan of care, call light within reach and visual checks through the night

## 2020-02-07 VITALS
HEART RATE: 60 BPM | BODY MASS INDEX: 29.45 KG/M2 | HEIGHT: 74 IN | OXYGEN SATURATION: 99 % | TEMPERATURE: 96.6 F | WEIGHT: 229.5 LBS | DIASTOLIC BLOOD PRESSURE: 56 MMHG | RESPIRATION RATE: 17 BRPM | SYSTOLIC BLOOD PRESSURE: 111 MMHG

## 2020-02-07 LAB
GLUCOSE BLD-MCNC: 109 MG/DL (ref 65–99)
GLUCOSE BLD-MCNC: 140 MG/DL (ref 65–99)

## 2020-02-07 PROCEDURE — 99239 HOSP IP/OBS DSCHRG MGMT >30: CPT | Performed by: INTERNAL MEDICINE

## 2020-02-07 PROCEDURE — A9270 NON-COVERED ITEM OR SERVICE: HCPCS | Performed by: INTERNAL MEDICINE

## 2020-02-07 PROCEDURE — 700111 HCHG RX REV CODE 636 W/ 250 OVERRIDE (IP): Performed by: INTERNAL MEDICINE

## 2020-02-07 PROCEDURE — 700102 HCHG RX REV CODE 250 W/ 637 OVERRIDE(OP): Performed by: INTERNAL MEDICINE

## 2020-02-07 PROCEDURE — 82962 GLUCOSE BLOOD TEST: CPT | Mod: 91

## 2020-02-07 RX ADMIN — ENOXAPARIN SODIUM 40 MG: 100 INJECTION SUBCUTANEOUS at 05:39

## 2020-02-07 RX ADMIN — FINASTERIDE 5 MG: 5 TABLET, FILM COATED ORAL at 05:39

## 2020-02-07 RX ADMIN — INSULIN GLARGINE 40 UNITS: 100 INJECTION, SOLUTION SUBCUTANEOUS at 05:43

## 2020-02-07 NOTE — DISCHARGE INSTRUCTIONS
Discharge Instructions    Discharged to home by car with relative. Discharged via wheelchair, hospital escort: Yes.  Special equipment needed: Not Applicable    Be sure to schedule a follow-up appointment with your primary care doctor or any specialists as instructed.     Discharge Plan:   Diet Plan: Discussed  Activity Level: Discussed  Confirmed Follow up Appointment: Patient to Call and Schedule Appointment  Confirmed Symptoms Management: Discussed  Medication Reconciliation Updated: Yes  Influenza Vaccine Indication: Not indicated: Previously immunized this influenza season and > 8 years of age    I understand that a diet low in cholesterol, fat, and sodium is recommended for good health. Unless I have been given specific instructions below for another diet, I accept this instruction as my diet prescription.   Other diet: Diabetic    Special Instructions: None    · Is patient discharged on Warfarin / Coumadin?   No     Hyperglycemic Hyperosmolar State  Hyperglycemic hyperosmolar state is a serious condition in which you experience an extreme increase in your blood sugar (glucose) level. This makes your body become extremely dehydrated, which can be life threatening.   This condition usually happens to people with type 2 diabetes mellitus. It may occur over a period of days or even weeks. It may occur in those who have not been diagnosed with diabetes mellitus or in those people who have not been able to control their diabetes mellitus for various reasons. Normally your kidneys try to get rid of extra glucose through urine. However, if you do not drink enough fluids or if you drink fluids that contain sugar, your kidneys will no longer be able to get rid of the extra glucose and your blood glucose levels will increase.  CAUSES   This condition may be brought on by:  · Infection.  · Use of medicines that cause you to become dehydrated or cause you to lose fluid.  · Other illnesses.  · Not taking your diabetes  medication.  RISK FACTORS  · Older age.  · Poor management of diabetes mellitus.  · Inability to eat or drink normally.  · Heart failure.  · Infection.  · Surgery.  · Illness.  SYMPTOMS   · Extreme or increased thirst (although this may gradually disappear).  · Increased urination.  · Dry, parched mouth.  · Warm, dry skin that does not sweat.  · High fever.  · Sleepiness or confusion.  · Vision problems or vision loss.  · Seeing or hearing things that are not there (hallucinations).  · Weakness on one side of the body.  DIAGNOSIS   Hyperglycemic hyperosmolar state is diagnosed with a medical history and evaluation of laboratory test results.    TREATMENT    The goal of treatment is to correct the dehydration. Fluids are replaced through an IV tube.   HOME CARE INSTRUCTIONS  · Check your blood glucose level regularly.  · Talk with your health care provider about when to check your blood glucose level and what the numbers mean.  · Check your blood glucose level more often when you are sick. Ask your health care provider about your sick day plan.  · Always stay well hydrated especially when you are ill, exercising, or you are out in the heat.  SEEK MEDICAL CARE IF:  · You are ill and cannot eat or drink.  · You develop a fever.  SEEK IMMEDIATE MEDICAL CARE IF:  You develop any of the symptoms listed above of hyperglycemic hyperosmolar state.  MAKE SURE YOU:  · Understand these instructions.    · Will watch your condition.    · Will get help right away if you are not doing well or get worse.  This information is not intended to replace advice given to you by your health care provider. Make sure you discuss any questions you have with your health care provider.  Document Released: 10/20/2005 Document Revised: 01/08/2016 Document Reviewed: 05/27/2014  Elsevier Interactive Patient Education © 2017 [employer]      Depression / Suicide Risk    As you are discharged from this [facility], it is important to  learn how to keep safe from harming yourself.    Recognize the warning signs:  · Abrupt changes in personality, positive or negative- including increase in energy   · Giving away possessions  · Change in eating patterns- significant weight changes-  positive or negative  · Change in sleeping patterns- unable to sleep or sleeping all the time   · Unwillingness or inability to communicate  · Depression  · Unusual sadness, discouragement and loneliness  · Talk of wanting to die  · Neglect of personal appearance   · Rebelliousness- reckless behavior  · Withdrawal from people/activities they love  · Confusion- inability to concentrate     If you or a loved one observes any of these behaviors or has concerns about self-harm, here's what you can do:  · Talk about it- your feelings and reasons for harming yourself  · Remove any means that you might use to hurt yourself (examples: pills, rope, extension cords, firearm)  · Get professional help from the community (Mental Health, Substance Abuse, psychological counseling)  · Do not be alone:Call your Safe Contact- someone whom you trust who will be there for you.  · Call your local CRISIS HOTLINE 447-8373 or 939-897-4193  · Call your local Children's Mobile Crisis Response Team [place] (026) 291-6226 or www.[employer]  · Call the toll free National Suicide Prevention Hotlines   · National Suicide Prevention Lifeline 968-927-PNPB (5442)  · National Hope Line Network 800-SUICIDE (032-0483)

## 2020-02-07 NOTE — PROGRESS NOTES
Pt discharged off unit via wheelchair with wife and daughter. Discharge instructions provided, PIV removed. Wanzackarydantelisa returned to . All questions answered.

## 2020-02-07 NOTE — PROGRESS NOTES
Received report from night shift and assumed care. Assessment completed, POC discussed. Pt is A&OX3, disoriented to situation. Denies pain or discomfort. Pt has wanderguard in place to right wrist. Ambulates with no assistance with FWW. All needs met at this time. Safety precautions and hourly rounding in place.

## 2020-02-07 NOTE — DISCHARGE SUMMARY
"Discharge Summary    CHIEF COMPLAINT ON ADMISSION  Chief Complaint   Patient presents with   • Altered Mental Status     pt arrived with wife who is currently being admitted. the pt was found in a storage room very comfused the pt then was found by security in another persons car trying to drive \"home\" he stated. the pt had no recollection he took an abulance with his wife earlier today and the car he was sitting in was not his. poor hx but wife reports he has been \"bad the last two days\" refering ot his memory and that he has diabetes       Reason for Admission  AL     Admission Date  2/1/2020    CODE STATUS  Full Code    HPI & HOSPITAL COURSE     70yo M with DM, HTN, CKD brought in with EMS in ambulance for his wife's back pain but then patient found wandering in hospital confused and work up was concerning for hyperosmolar non-ketotic state.  Please refer to H&P for detail.  The patient was admitted to ICU due to his hyperosmolar nonketotic state.  He was started on insulin infusion with aggressive IV fluid resuscitation.  His symptoms continue to improve while in the hospital and later he was transferred back to medical floor.  He has been hemodynamically stable.  His labs are benign.  He has been able to answer all the questions appropriately.  He is alert and oriented x4.  I discussed with his wife and she thinks that he is back to his baseline.  I saw and examined the patient today.  He will be discharged home today and follow-up with his PCP as an outpatient in 1 week.    Please call [phone] to schedule PCP appointment for patient.          Therefore, he is discharged in fair and stable condition to home with close outpatient follow-up.    The patient met 2-midnight criteria for an inpatient stay at the time of discharge.    Discharge Date  02/07/20      FOLLOW UP ITEMS POST DISCHARGE      DISCHARGE DIAGNOSES  Principal Problem:    Hyperosmolar coma (HCC) POA: Unknown  Active Problems:    Acute " metabolic encephalopathy POA: Unknown    Dyslipidemia POA: Yes    CKD (chronic kidney disease) stage 3, GFR 30-59 ml/min ([facility]) POA: Yes    Essential hypertension POA: Yes    SHAKA (obstructive sleep apnea) POA: Yes    Hypokalemia POA: Unknown  Resolved Problems:    * No resolved hospital problems. *      FOLLOW UP  Future Appointments   Date Time Provider Department Center   2/18/2020  3:00 PM [facility]RIST[facility] None     ASHLEY Cabral  [address] [facility] [address]  [phone]    In 1 week        MEDICATIONS ON DISCHARGE     Medication List      CONTINUE taking these medications      Instructions   atorvastatin 40 MG Tabs  Commonly known as:  LIPITOR   Take 40 mg by mouth every evening.  Dose:  40 mg     carvedilol 25 MG Tabs  Commonly known as:  COREG   Take 25 mg by mouth 2 Times a Day.  Dose:  25 mg     enalapril 20 MG tablet  Commonly known as:  VASOTEC   Take 1 Tab by mouth 2 Times a Day.  Dose:  1 Tab     fenofibrate 145 MG Tabs  Commonly known as:  TRICOR   Take 145 mg by mouth every day.  Dose:  145 mg     finasteride 5 MG Tabs  Commonly known as:  PROSCAR   Take 5 mg by mouth every day.  Dose:  5 mg     furosemide 20 MG Tabs  Commonly known as:  LASIX   Take 20 mg by mouth 2 times a day.  Dose:  20 mg     glimepiride 4 MG Tabs  Commonly known as:  AMARYL   Take 4 mg by mouth every morning. 1/2 tablet at night  Dose:  4 mg     hydroCHLOROthiazide 25 MG Tabs  Commonly known as:  HYDRODIURIL   Take 12.5 mg by mouth every day.  Dose:  12.5 mg     insulin glargine 100 UNIT/ML Sopn injection  Generic drug:  insulin glargine   52-74 Units 2 times a day. 74 units AM, 52 units PM  Dose:  52-74 Units     Korlym 300 MG Tabs  Generic drug:  miFEPRIStone   Take 300-600 mg by mouth every 48 hours.  Dose:  300-600 mg     nortriptyline 25 MG Caps  Commonly known as:  PAMELOR   Take 25 mg by mouth every evening.  Dose:  25 mg     potassium citrate SR 10 MEQ (1080 MG) Tbcr  Commonly known as:  UROCIT-K  SR   Take 20 mEq by mouth 3 times a day.  Dose:  20 mEq     spironolactone 25 MG Tabs  Commonly known as:  ALDACTONE   Take 12.5 mg by mouth every day.  Dose:  12.5 mg     tamsulosin 0.4 MG capsule  Commonly known as:  FLOMAX   Take 0.4 mg by mouth every evening.  Dose:  0.4 mg     Trulicity 1.5 MG/0.5ML Sopn  Generic drug:  Dulaglutide   Inject 1.5 mg as instructed every 7 days.  Dose:  1.5 mg            Allergies  Allergies   Allergen Reactions   • Penicillins Swelling     Turn red  With spots       DIET  Orders Placed This Encounter   Procedures   • Diet Order Diabetic     Standing Status:   Standing     Number of Occurrences:   1     Order Specific Question:   Diet:     Answer:   Diabetic [3]       ACTIVITY  As tolerated.  Weight bearing as tolerated    CONSULTATIONS      PROCEDURES      LABORATORY  Lab Results   Component Value Date    SODIUM 140 02/04/2020    POTASSIUM 3.9 02/04/2020    CHLORIDE 111 02/04/2020    CO2 25 02/04/2020    GLUCOSE 117 (H) 02/04/2020    BUN 18 02/04/2020    CREATININE 1.74 (H) 02/04/2020        Lab Results   Component Value Date    WBC 4.3 (L) 02/04/2020    HEMOGLOBIN 11.2 (L) 02/04/2020    HEMATOCRIT 32.6 (L) 02/04/2020    PLATELETCT 179 02/04/2020        Total time of the discharge process exceeds 38 minutes.

## 2020-02-07 NOTE — PROGRESS NOTES
Assessment complete. Pt A&O3. Room air. Independent in room. Wanderguard on right wrist. Diabetic diet. ACHS. Pt denied pain. Call bell within reach. Bed low and locked.

## 2020-02-07 NOTE — CARE PLAN
Problem: Safety  Goal: Will remain free from injury  Outcome: PROGRESSING AS EXPECTED     Problem: Discharge Barriers/Planning  Goal: Patient's continuum of care needs will be met  Outcome: PROGRESSING AS EXPECTED

## 2020-02-07 NOTE — CARE PLAN
Problem: Communication  Goal: The ability to communicate needs accurately and effectively will improve  Outcome: PROGRESSING AS EXPECTED  Note:   Pt communicates all needs appropriately     Problem: Safety  Goal: Will remain free from injury  Outcome: PROGRESSING AS EXPECTED  Note:   Pt remains injury free

## 2021-01-16 DIAGNOSIS — Z23 NEED FOR VACCINATION: ICD-10-CM

## 2022-03-21 ENCOUNTER — OFFICE VISIT (OUTPATIENT)
Dept: NEUROLOGY | Facility: MEDICAL CENTER | Age: 72
End: 2022-03-21
Attending: PSYCHIATRY & NEUROLOGY
Payer: MEDICARE

## 2022-03-21 VITALS
RESPIRATION RATE: 14 BRPM | OXYGEN SATURATION: 98 % | WEIGHT: 259.7 LBS | DIASTOLIC BLOOD PRESSURE: 60 MMHG | BODY MASS INDEX: 33.33 KG/M2 | HEIGHT: 74 IN | TEMPERATURE: 97.6 F | SYSTOLIC BLOOD PRESSURE: 118 MMHG | HEART RATE: 62 BPM

## 2022-03-21 DIAGNOSIS — I10 ESSENTIAL HYPERTENSION: ICD-10-CM

## 2022-03-21 DIAGNOSIS — F03.B0 MODERATE DEMENTIA WITHOUT BEHAVIORAL DISTURBANCE (HCC): Primary | ICD-10-CM

## 2022-03-21 DIAGNOSIS — G47.33 OSA (OBSTRUCTIVE SLEEP APNEA): ICD-10-CM

## 2022-03-21 DIAGNOSIS — G30.9 ALZHEIMER'S DISEASE, UNSPECIFIED (CODE) (HCC): ICD-10-CM

## 2022-03-21 DIAGNOSIS — Z91.81 HX OF FALLING: ICD-10-CM

## 2022-03-21 DIAGNOSIS — F51.01 PRIMARY INSOMNIA: ICD-10-CM

## 2022-03-21 PROBLEM — E11.42 POLYNEUROPATHY DUE TO TYPE 2 DIABETES MELLITUS (HCC): Status: ACTIVE | Noted: 2022-03-21

## 2022-03-21 PROBLEM — E55.9 VITAMIN D DEFICIENCY: Status: ACTIVE | Noted: 2022-03-21

## 2022-03-21 PROBLEM — E11.65 HYPERGLYCEMIA DUE TO TYPE 2 DIABETES MELLITUS (HCC): Status: ACTIVE | Noted: 2022-03-21

## 2022-03-21 PROBLEM — E78.5 HYPERLIPIDEMIA: Status: ACTIVE | Noted: 2022-03-21

## 2022-03-21 PROBLEM — E11.43 AUTONOMIC NEUROPATHY DUE TO TYPE 2 DIABETES MELLITUS (HCC): Status: ACTIVE | Noted: 2022-03-21

## 2022-03-21 PROBLEM — R80.9 PROTEINURIA: Status: ACTIVE | Noted: 2022-03-21

## 2022-03-21 PROBLEM — E11.51 PERIPHERAL VASCULAR DISORDER DUE TO DIABETES MELLITUS (HCC): Status: ACTIVE | Noted: 2022-03-21

## 2022-03-21 PROBLEM — E24.9 CUSHING'S SYNDROME (HCC): Status: ACTIVE | Noted: 2022-03-21

## 2022-03-21 PROCEDURE — 99212 OFFICE O/P EST SF 10 MIN: CPT | Performed by: PSYCHIATRY & NEUROLOGY

## 2022-03-21 PROCEDURE — 99205 OFFICE O/P NEW HI 60 MIN: CPT | Performed by: PSYCHIATRY & NEUROLOGY

## 2022-03-21 RX ORDER — ATORVASTATIN CALCIUM 40 MG/1
40 TABLET, FILM COATED ORAL DAILY
COMMUNITY

## 2022-03-21 RX ORDER — AMLODIPINE BESYLATE 10 MG/1
5 TABLET ORAL
COMMUNITY
Start: 2022-03-01

## 2022-03-21 RX ORDER — INSULIN DEGLUDEC 100 U/ML
INJECTION, SOLUTION SUBCUTANEOUS
COMMUNITY
Start: 2022-01-31

## 2022-03-21 RX ORDER — GLUCAGON 3 MG/1
POWDER NASAL
COMMUNITY

## 2022-03-21 RX ORDER — ENALAPRIL MALEATE 10 MG/1
TABLET ORAL
COMMUNITY
End: 2022-03-21

## 2022-03-21 RX ORDER — QUETIAPINE FUMARATE 50 MG/1
TABLET, FILM COATED ORAL
COMMUNITY
Start: 2022-02-14

## 2022-03-21 RX ORDER — HYDRALAZINE HYDROCHLORIDE 25 MG/1
TABLET, FILM COATED ORAL
COMMUNITY
Start: 2022-03-14 | End: 2022-03-21

## 2022-03-21 RX ORDER — DULAGLUTIDE 4.5 MG/.5ML
INJECTION, SOLUTION SUBCUTANEOUS
COMMUNITY
End: 2022-03-21

## 2022-03-21 RX ORDER — OMEGA-3 FATTY ACIDS/FISH OIL 300-1000MG
CAPSULE ORAL
COMMUNITY

## 2022-03-21 RX ORDER — INSULIN ASPART 100 [IU]/ML
INJECTION, SOLUTION INTRAVENOUS; SUBCUTANEOUS
COMMUNITY

## 2022-03-21 ASSESSMENT — MONTREAL COGNITIVE ASSESSMENT (MOCA)
4. NAME EACH OF THE THREE ANIMALS SHOWN: 3/3
7. [VIGILENCE] TAP WHEN HEARING DESIGNATED LETTER: 1/1
3. DRAW A CLOCK: CONTOUR, NUMBERS, HANDS: 2/3
9. REPEAT EACH SENTENCE: 0/2
WHAT IS THE TOTAL SCORE (OUT OF 30): 15
1. ALTERNATING TRAIL MAKING: 0/1
6. READ LIST OF DIGITS [FORWARD/BACKWARD]: 2/2
DELAYED RECALL SUBSCORE: 3/5
WHAT IS THE VERSION OF MOCA ADMINISTERED: 7.1
10. [FLUENCY] NAME WORDS STARTING WITH DESIGNATED LETTER: 0/1
CATEGORY CUE (IF APPLICABLE): 1
2. COPY DRAWING: 0/1
8. SERIAL SUBTRACTION OF 7S: 1/5
CATEGORY CUE (IF APPLICABLE): 1
11. FOR EACH PAIR OF WORDS, WHAT CATEGORY DO THEY BELONG TO (OUT OF 2): 1/2
5. MEMORY TRIALS: SECOND TRIAL
ORIENTATION SUBSCORE: 2/6

## 2022-03-21 ASSESSMENT — PATIENT HEALTH QUESTIONNAIRE - PHQ9: CLINICAL INTERPRETATION OF PHQ2 SCORE: 0

## 2022-03-21 NOTE — PROGRESS NOTES
"Reason for Neurology Consult:  Concern for Dementia    History of present illness:    Davie Rodriguez 71 y.o right handed gentleman who is retired at the [employer] ([employer] )- retired about 15 years ago. He lives with his wife and his step daughter (here today).    He has not clearly been aware of any notable changes in his subjective memory or cognitive issues in the recent years. He does recognize that he has problems with remembering fraternity problems names.   He is aware that his daughter does have to help his remember certain items.    He feels that he retired from his last job in 2021 but in fact he retired about 15-16 years ago.    He says he had a \"head bonk\" when going into the bathroom and tripped on the doorstop and his head hit the ceramic floor- no LOC. The last time he fell when he stepped down the step and missed it and went right down on the concrete and then hit his head on the bumper of his car. His daughter was not aware of this fall. He did not have any prodrome,warning prior to this event. He his his forehead during this fall.    About 12 months ago, Davie kunz started to notice he was generally forgetful and he had an admission in [facility] and he got delusional and confused and his BP was very high (he was diagnosed with hypertensive urgency). He had not been taking his blood sugar medication consistently and his blood sugars were very high.    He will infrequently repeat himself and he within 1 to 2 hours often forget completely what is told to him by others including his step daughter.    No ongoing headache(s),visual disturbances, involuntary movements of the body or limbs in the last 6-12 months.    Daughter needs to organize and remind Davie of his medications consistently since she moved in (in October 2021).    History of CPAP- surgery this for this about 4-5 years ago. He infrequently snores. He gets more than 7 hours of sleep most " nights.    Seroquel- 75 mg QHS > on this dose x 1 month from 50 mg for recurrent awakenings.    The daughter has recognized that he need to write things down on a calender and can no longer make out of a check (daughter is doing all the finances)    Speech-communication has not changed in the last few years.        Remote drinking during college years at [employer] but nothing consistent in adult life otherwise. Non smoker.      Patient Active Problem List    Diagnosis Date Noted   • Hypokalemia 02/02/2020   • Hyperosmolar coma (HCC) 02/01/2020   • Acute metabolic encephalopathy 02/01/2020   • SHAKA (obstructive sleep apnea) 12/26/2017   • BMI 36.0-36.9,adult 07/11/2017   • Elevated coronary artery calcium score: 250 in March 2015 11/08/2016   • Essential hypertension 01/07/2016   • CKD (chronic kidney disease) stage 3, GFR 30-59 ml/min ([facility]) 09/17/2015   • Dyslipidemia 09/04/2014   • History of obstructive sleep apnea: History of surgery in approximately 2001 09/04/2014       Past medical history:   Past Medical History:   Diagnosis Date   • Diabetes     insulin and oral medication    • Emphysema of lung ([facility])    • High cholesterol    • Hyperlipidemia    • Hypertension    • Pain     lower back pain    • Renal disorder     STONES   • Sleep apnea     uses CPAP with oxygen    • Snoring        Past surgical history:   Past Surgical History:   Procedure Laterality Date   • LUMBAR LAMINECTOMY DISKECTOMY Right 9/3/2019    Procedure: LAMINECTOMY, SPINE, LUMBAR, WITH DISCECTOMY - L4-5 HEMILAMINECTOMY W/RIGHT MEDIAL FACETECTOMY;  Surgeon: Meir Moss M.D.;  Location: [facility];  Service: Neurosurgery   • FORAMINOTOMY Right 9/3/2019    Procedure: FORAMINOTOMY, SPINE - L4-5;  Surgeon: Meir Moss M.D.;  Location: [facility];  Service: Neurosurgery   • PERCUTANEOUS NEPHROSTOLITHOTOMY  6/23/2010    Performed by FOX GARCIA at [facility]   • RECOVERY  6/21/2010    Performed by  SURGERY, IR-RECOVERY at SURGERY SAME DAY [facility] ORS   • OTHER  1995    GALL BLADDER REMOVED         Social history:   Social History     Socioeconomic History   • Marital status:      Spouse name: Not on file   • Number of children: Not on file   • Years of education: Not on file   • Highest education level: Not on file   Occupational History   • Not on file   Tobacco Use   • Smoking status: Never Smoker   • Smokeless tobacco: Never Used   Vaping Use   • Vaping Use: Never used   Substance and Sexual Activity   • Alcohol use: No   • Drug use: No   • Sexual activity: Not on file   Other Topics Concern   • Not on file   Social History Narrative   • Not on file     Social Determinants of Health     Financial Resource Strain: Not on file   Food Insecurity: Not on file   Transportation Needs: Not on file   Physical Activity: Not on file   Stress: Not on file   Social Connections: Not on file   Intimate Partner Violence: Not on file   Housing Stability: Not on file       Family history:   Family History   Problem Relation Age of Onset   • Heart Attack Father 57   • Other Sister 72        diabetes   • Sleep Apnea Sister          Current medications:   Current Outpatient Medications   Medication   • amLODIPine (NORVASC) 10 MG Tab   • hydrALAZINE (APRESOLINE) 25 MG Tab   • TRESIBA FLEXTOUCH 100 UNIT/ML Solution Pen-injector   • QUEtiapine (SEROQUEL) 50 MG tablet   • atorvastatin (LIPITOR) 40 MG Tab   • carvedilol (COREG) 25 MG Tab   • finasteride (PROSCAR) 5 MG Tab   • furosemide (LASIX) 20 MG Tab   • hydroCHLOROthiazide (HYDRODIURIL) 25 MG Tab   • enalapril (VASOTEC) 20 MG tablet   • Dulaglutide 1.5 MG/0.5ML Solution Pen-injector   • Glucagon (BAQSIMI TWO PACK) 3 MG/DOSE Powder   • miFEPRIStone 300 MG Tab   • potassium citrate SR (UROCIT-K SR) 10 MEQ (1080 MG) Tab CR   • fenofibrate (TRICOR) 145 MG Tab   • spironolactone (ALDACTONE) 25 MG Tab   • nortriptyline (PAMELOR) 25 MG Cap   • glimepiride (AMARYL) 4 MG Tab  "  • tamsulosin (FLOMAX) 0.4 MG capsule   • Insulin Glargine (BASAGLAR KWIKPEN) 100 UNIT/ML Solution Pen-injector     No current facility-administered medications for this visit.       Medication Allergy:  Allergies   Allergen Reactions   • Penicillins Swelling     Turn red  With spots   • Penicillin G Hives           Physical examination:   Vitals:    03/21/22 1255   BP: 118/60   BP Location: Right arm   Patient Position: Sitting   BP Cuff Size: Large adult   Pulse: 62   Resp: 14   Temp: 36.4 °C (97.6 °F)   TempSrc: Temporal   SpO2: 98%   Weight: 118 kg (259 lb 11.2 oz)   Height: 1.88 m (6' 2\")       Normal cephalic atraumatic.  There is full range of movement around the neck in all directions without restrictions or discrete pain evoked triggers.  No lower extremity edema.      Neurological  Exam:      Meet Cognitive Assessment (MOCA) Version 7.1    Years of Education:4 years of college [employer]    TOTAL SCORE: 16/30  (to be scanned into the MEDIA section in the E.M.R.)    President of - Allan Montalvo and before that Mari (had some difficulty      Mental status: Awake, alert and fully oriented to person, place and situation. Normal attention and concentration.  Did not appear/act combative,irritable,anxious,paranoid/delusional or aggressive to or with me.    Speech and language: Speech is fluent without errors, clear, intact to repetition and intact to naming.     Follows 3 step motor commands in sequence without significant delay and correctly.    Cranial nerve exam:  II: Pupils are equally round and reactive to light. Visual fields are intact by confrontation.  III, IV, VI: EOMI, no diplopia, no ptosis.  V: Sensation to light touch is normal over V1-3 distributions bilaterally.  .  VII: Facial movements are symmetrical. There is no facial droop. .  VIII: Hearing intact to soft speech and finger rub bilaterally  IX: Palate elevates symmetrically, uvula is midline. Dysarthria is not present.  XI: Shoulder shrug are " symmetrical and strong.   XII: Tongue protrudes midline.      Motor exam:  Muscle tone is normal in all 4 limbs. and No abnormal movements appreciated.    Muscle strength:    Neck Flexors/Extensors: 5/5       Right  Left  Deltoid   5/5  5/5      Biceps   5/5  5/5  Triceps  5/5  5/5   Wrist extensors 5/5  5/5  Wrist flexors  5/5  5/5     5/5  5/5  Interossei  5/5  5/5  Thenar (APB)  5/5  5/5   Hip flexors  5/5  5/5  Quadriceps  5/5  5/5    Hamstrings  5/5  5/5  Dorsiflexors  5/5  5/5  Plantarflexors  5/5  5/5  Toe extension  5/5  5/5            Reflexes:       Right  Left  Biceps   2/4  2/4  Triceps  2/4  2/4  Brachioradialis 2/4  2/4  Knee jerk  /4  2/4  Ankle jerk  /  2/4     Frontal release signs are absent    bilaterally toes are downgoing to plantar stimulation..    Coordination (finger-to-nose, heel/knee/shin, rapid alternating movements) was normal.     There was no ataxia, no tremors, and no dysmetria.     Station and gait were normal. Easily stands up from exam chair without retropulsion,veering,leaning,swaying (to either side).       Labs and Tests:    Order: [id number]   Status: Final result     Next appt: None       Ref Range & Units 4 mo ago   Glycohemoglobin 4.0 - 5.6 % 8.1 High              Blood work reviewed from the last 3 months and back in 2020 (B12,Folate,TSH and T pallidium EIA)- wnl     NEUROIMAGIN:    2020 1:07 PM     HISTORY/REASON FOR EXAM:  Altered mental status  Altered mental status.     TECHNIQUE/EXAM DESCRIPTION AND NUMBER OF VIEWS:  CT of the head without contrast.     The study was performed on a helical multidetector CT scanner. Contiguous 2.5 mm axial sections were obtained from the skull base through the vertex.     Up to date radiation dose reduction adjustments have been utilized to meet ALARA standards for radiation dose reduction.     COMPARISON:  None available     FINDINGS:  There is no evidence of acute intracranial hemorrhage, mass, mass-effect or  shift of midline structures.     Gray-white matter differentiation is grossly preserved.     Ventricle size and brain parenchymal volume are appropriate for this patient's stated age.     The basal cisterns are patent.     There are no abnormal extra-axial fluid collections.     No depressed or widely  calvarial fracture is seen.     The visualized paranasal sinuses and temporal bone structures are aerated.     ___________________________________     IMPRESSION:        1. No acute intracranial abnormality. No evidence of acute intracranial hemorrhage or mass lesion.       Impression/Plans/Recommendations:    1. Dementia (mild to moderate stage)- likely Neurodegenerative given time line and type of cognitive/memory disturbances.    No parkinsonian features.    Mild insomnia which is much improved with 75 mg of QHS Seroquel.    Onset of memory predominant symptoms started over 1 year ago but not rapidly progressive.    There  Has been no paranoia,hallucination(s) in the recent few months.    MOCA score of 16/30 today per daughter.    Global Deterioration Score in the solid 5 range.    FAQ score of 27/30 today per daughter.    2. 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    3. Hx of 2 falls in the last 12 months- doing well from this respect; s/p PT evaluation.    4. HTN- controlled today\          Plans:    A. Reviewed diagnosis today and likelihood this is a brain neurodegenerative condition.    B. Will perform Brain MRI to look at structure of tissue and then will proceed to Brain PET Scan (agreed up by Virgil and his daughter).    C. Vitamin B level rechecks.    D. Encouraged long term blood sugar control.    E. Weight reduction- under 28 BMI goal.    F. The MIND Diet    G .Alzheimer's Packet given to daughter and Alzheimer's Connect form filled out today.    Total time spent today or this patient's care was 65  minutes  and included  reviewing  the diagnostic workup to date (such as labs and imaging as well as interpreting such tests relevant to this patient's neurological condition),  reviewing/obtaining separately obtained history (from patient and/or accompanying ffamily member)  for today's neurological problem(s) ,counseling and educating the patient and family member on issues related to cognition/memory and cognitive health factors and documenting  the clinical information in the EMR.      Blaine Owens MD  [facility].   [facility]

## 2022-05-16 LAB
FOLATE SERPL-MCNC: 6.5 NG/ML
VIT B1 BLD-SCNC: 125.7 NMOL/L (ref 66.5–200)
VIT B12 SERPL-MCNC: 495 PG/ML (ref 232–1245)

## 2022-08-26 ENCOUNTER — OFFICE VISIT (OUTPATIENT)
Dept: NEUROLOGY | Facility: MEDICAL CENTER | Age: 72
End: 2022-08-26
Attending: PSYCHIATRY & NEUROLOGY
Payer: MEDICARE

## 2022-08-26 VITALS
RESPIRATION RATE: 14 BRPM | DIASTOLIC BLOOD PRESSURE: 72 MMHG | SYSTOLIC BLOOD PRESSURE: 128 MMHG | BODY MASS INDEX: 34.49 KG/M2 | HEART RATE: 68 BPM | OXYGEN SATURATION: 97 % | TEMPERATURE: 98 F | WEIGHT: 268.74 LBS | HEIGHT: 74 IN

## 2022-08-26 DIAGNOSIS — G30.9 ALZHEIMER'S DISEASE, UNSPECIFIED (CODE) (HCC): ICD-10-CM

## 2022-08-26 DIAGNOSIS — E66.9 OBESITY (BMI 30.0-34.9): ICD-10-CM

## 2022-08-26 DIAGNOSIS — F03.B0 MODERATE DEMENTIA WITHOUT BEHAVIORAL DISTURBANCE (HCC): Primary | ICD-10-CM

## 2022-08-26 PROCEDURE — 99214 OFFICE O/P EST MOD 30 MIN: CPT | Performed by: PSYCHIATRY & NEUROLOGY

## 2022-08-26 PROCEDURE — 99211 OFF/OP EST MAY X REQ PHY/QHP: CPT | Performed by: PSYCHIATRY & NEUROLOGY

## 2022-08-26 RX ORDER — SODIUM BICARBONATE 650 MG/1
1300 TABLET ORAL 2 TIMES DAILY
COMMUNITY
Start: 2022-08-21

## 2022-08-26 RX ORDER — POTASSIUM CHLORIDE 1500 MG/1
20 TABLET, EXTENDED RELEASE ORAL
COMMUNITY
Start: 2022-06-20

## 2022-08-26 RX ORDER — EMPAGLIFLOZIN 10 MG/1
1 TABLET, FILM COATED ORAL
COMMUNITY
Start: 2022-06-07

## 2022-08-26 RX ORDER — FLASH GLUCOSE SENSOR
KIT MISCELLANEOUS
COMMUNITY
Start: 2022-08-05

## 2022-08-26 RX ORDER — ENALAPRIL MALEATE 10 MG/1
TABLET ORAL
COMMUNITY
Start: 2022-06-20

## 2022-08-26 ASSESSMENT — PATIENT HEALTH QUESTIONNAIRE - PHQ9: CLINICAL INTERPRETATION OF PHQ2 SCORE: 0

## 2022-08-26 NOTE — PROGRESS NOTES
"Neuro note:    Last visit with me 3/21/2022.    Reason: Dementia    Davie Rodriguez 71 y.o right handed gentleman who is retired at [employer] )- retired about 15 years ago. He lives with his wife and his step daughter (here today).     Virgil has not clearly been aware of any notable changes in his subjective memory or cognitive issues in the recent years. He does recognize that he has problems with remembering fraternity problems names.   He is aware that his daughter does have to help his remember certain items.     He feels that he retired from his last job in 2021 but in fact he retired about 15-16 years ago.       Since last visit with me in 3/2022, he is able to mow lawn without assistance and he [place]ison the lawn independently.    Otherwise seems stable overall to his step daughter (here today).  He says he had a \"head bonk\" when going into the bathroom and tripped on the doorstop and his head hit the ceramic floor- no LOC. The last time he fell when he stepped down the step and missed it and went right down on the concrete and then hit his head on the bumper of his car. His daughter was not aware of this fall. He did not have any prodrome,warning prior to this event. He his his forehead during this fall.       He has NOT  been  repeating  himself per his step daughter.     No ongoing headache(s),visual disturbances, involuntary movements of the body or limbs in the last 6-12 months.     Daughter needs to organize and remind Davie of his medications consistently since she moved in (in October 2021).     History of CPAP- surgery this for this about 4-5 years ago. He infrequently snores. He gets more than 7 hours of sleep most nights.     Seroquel- 75 mg QHS > on this dose x 1 month from 50 mg for recurrent awakenings.     The daughter has recognized that he need to write things down on a calender and can no longer make out of a check (daughter is doing all the " finances)    Speech-communication has not changed in the last few years.        Remote drinking during college years at [employer] but nothing consistent in adult life otherwise. Non smoker.    Reason for Neurology Consult:       Patient Active Problem List    Diagnosis Date Noted    Autonomic neuropathy due to type 2 diabetes mellitus ([facility]) 03/21/2022    Cushing's syndrome ([facility]) 03/21/2022    Hyperlipidemia 03/21/2022    Hyperglycemia due to type 2 diabetes mellitus ([facility]) 03/21/2022    Peripheral vascular disorder due to diabetes mellitus ([facility]) 03/21/2022    Polyneuropathy due to type 2 diabetes mellitus ([facility]) 03/21/2022    Proteinuria 03/21/2022    Vitamin D deficiency 03/21/2022    Hypokalemia 02/02/2020    Hyperosmolar coma ([facility]) 02/01/2020    Acute metabolic encephalopathy 02/01/2020    SHAKA (obstructive sleep apnea) 12/26/2017    BMI 36.0-36.9,adult 07/11/2017    Elevated coronary artery calcium score: 250 in March 2015 11/08/2016    Essential hypertension 01/07/2016    CKD (chronic kidney disease) stage 3, GFR 30-59 ml/min ([facility]) 09/17/2015    Dyslipidemia 09/04/2014    History of obstructive sleep apnea: History of surgery in approximately 2001 09/04/2014       Past medical history:   Past Medical History:   Diagnosis Date    Diabetes     insulin and oral medication     Emphysema of lung ([facility])     High cholesterol     Hyperlipidemia     Hypertension     Pain     lower back pain     Renal disorder     STONES    Sleep apnea     uses CPAP with oxygen     Snoring        Past surgical history:   Past Surgical History:   Procedure Laterality Date    LUMBAR LAMINECTOMY DISKECTOMY Right 9/3/2019    Procedure: LAMINECTOMY, SPINE, LUMBAR, WITH DISCECTOMY - L4-5 HEMILAMINECTOMY W/RIGHT MEDIAL FACETECTOMY;  Surgeon: Meir Moss M.D.;  Location: [facility];  Service: Neurosurgery    FORAMINOTOMY Right 9/3/2019    Procedure: FORAMINOTOMY, SPINE - L4-5;  Surgeon: Meir Moss M.D.;  Location: [facility];  Service: Neurosurgery    PERCUTANEOUS NEPHROSTOLITHOTOMY  6/23/2010    Performed by FOX GARCIA at [facility]    RECOVERY  6/21/2010    Performed by LANA, IR-RECOVERY at [facility]    OTHER  1995    GALL BLADDER REMOVED         Social history:   Social History     Socioeconomic History    Marital status:      Spouse name: Not on file    Number of children: Not on file    Years of education: Not on file    Highest education level: Not on file   Occupational History    Not on file   Tobacco Use    Smoking status: Never    Smokeless tobacco: Never   Vaping Use    Vaping Use: Never used   Substance and Sexual Activity    Alcohol use: No    Drug use: No    Sexual activity: Not on file   Other Topics Concern    Not on file   Social History Narrative    Not on file     Social Determinants of Health     Financial Resource Strain: Not on file   Food Insecurity: Not on file   Transportation Needs: Not on file   Physical Activity: Not on file   Stress: Not on file   Social Connections: Not on file   Intimate Partner Violence: Not on file   Housing Stability: Not on file       Family history:   Family History   Problem Relation Age of Onset    Heart Attack Father 57    Other Sister 72        diabetes    Sleep Apnea Sister          Current medications:   Current Outpatient Medications   Medication    Continuous Blood Gluc Sensor (AppSlingrYLE SASHA 14 DAY SENSOR) Misc    JARDIANCE 10 MG Tab    enalapril (VASOTEC) 10 MG Tab    potassium Chloride ER (K-TAB) 20 MEQ Tab CR tablet    sodium bicarbonate (SODIUM BICARBONATE) 650 MG Tab    amLODIPine (NORVASC) 10 MG Tab    atorvastatin (LIPITOR) 40 MG Tab    finasteride (PROSCAR) 5 MG Tab    furosemide (LASIX) 20 MG Tab    hydroCHLOROthiazide (HYDRODIURIL) 25 MG Tab    Glucagon (BAQSIMI TWO PACK) 3 MG/DOSE Powder    TRESIBA FLEXTOUCH 100 UNIT/ML Solution Pen-injector    QUEtiapine (SEROQUEL) 50 MG tablet    insulin aspart  "(NOVOLOG FLEXPEN) 100 UNIT/ML injection PEN    Omega 3 1000 MG Cap    potassium citrate SR (UROCIT-K SR) 10 MEQ (1080 MG) Tab CR    fenofibrate (TRICOR) 145 MG Tab    nortriptyline (PAMELOR) 25 MG Cap    glimepiride (AMARYL) 4 MG Tab    tamsulosin (FLOMAX) 0.4 MG capsule    Insulin Glargine (BASAGLAR KWIKPEN) 100 UNIT/ML Solution Pen-injector    Dulaglutide 1.5 MG/0.5ML Solution Pen-injector     No current facility-administered medications for this visit.       Medication Allergy:  Allergies   Allergen Reactions    Penicillins Swelling     Turn red  With spots    Penicillin G Hives           Physical examination:   Vitals:    08/26/22 0901   BP: 128/72   BP Location: Right arm   Patient Position: Sitting   BP Cuff Size: Adult   Pulse: 68   Resp: 14   Temp: 36.7 °C (98 °F)   TempSrc: Temporal   SpO2: 97%   Weight: 122 kg (268 lb 11.9 oz)   Height: 1.88 m (6' 2\")       Normal cephalic atraumatic.  There is full range of movement around the neck in all directions without restrictions or discrete pain evoked triggers.  No lower extremity edema.      Neurological  Exam:      Delta Cognitive Assessment (MOCA) Version 7.1    Years of Education: 4 years college    TOTAL SCORE: 18/30  (to be scanned into the MEDIA section in the E.M.R.)          Mental status: Awake, alert and fully oriented to person, place, and situation. Normal attention and concentration.  Did not appear/act combative,irritable,anxious,paranoid/delusional or aggressive to or with me.    Speech and language: Speech is fluent without errors, clear, intact to repetition, and intact to naming.     Follows 3 step motor commands in sequence without significant delay and correctly.    Cranial nerve exam:  II: Pupils are equally round and reactive to light. Visual fields are intact by confrontation.  III, IV, VI: EOMI, no diplopia, no ptosis.  V: Sensation to light touch is normal over V1-3 distributions bilaterally.  .  VII: Facial movements are symmetrical. " There is no facial droop. .  VIII: Hearing intact to soft speech and finger rub bilaterally  IX: Palate elevates symmetrically, uvula is midline. Dysarthria is not present.  XI: Shoulder shrug are symmetrical and strong.   XII: Tongue protrudes midline.      Motor exam:  Muscle tone is normal in all 4 limbs. and No abnormal movements appreciated.    Muscle strength:    Neck Flexors/Extensors: 5/5       Right  Left  Deltoid   5/5  5/5      Biceps   5/5  5/5  Triceps  5/5  5/5   Wrist extensors 5/5  5/5  Wrist flexors  5/5  5/5     5/5  5/5  Interossei  5/5  5/5  Thenar (APB)  5/5  5/5   Hip flexors  5/5  5/5  Quadriceps  5/5  5/5    Hamstrings  5/5  5/5  Dorsiflexors  5/5  5/5  Plantarflexors  5/5  5/5  Toe extension  5/5  5/5      Reflexes:       Right  Left  Biceps    2/4  2/4  Triceps             2/4  2/4  Brachioradialis            2/4  2/4  Knee jerk  2/4  2/4  Ankle jerk  2/4  2/4     Frontal release signs are absent    bilaterally toes are downgoing to plantar stimulation..    Coordination (finger-to-nose, heel/knee/shin, rapid alternating movements) was normal.     There was no ataxia, no tremors, and no dysmetria.     Station and gait.     Easily stands up from exam chair without retropulsion,veering,leaning,swaying (to either side).       Labs and Tests:    Reviewed from 5/2022: B12,Folate and B1 levels (wnl)        Impression/Plans/Recommendations:        Dementia (mild to moderate stage)- likely neurodegenerative given time line and type of cognitive/memory disturbances.     There are no  parkinsonian features nor has there been an evolving psychotic condition.     Mild insomnia which is much improved with 75 mg of QHS Seroquel.      MOCA score of 18/30 today.     Global Deterioration Score in the 4 to 5 range.     FAQ score of 19/30 today per daughter.     2. 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     3.  No falls since  last visit with me.     4. HTN- controlled today      5. Obesity- mildly elevated.     6. O.S.A.- had prior surgery for this issue- no further snoring.        Plans:     A. Reviewed diagnosis today and likelihood this is a brain neurodegenerative condition.     B. Will perform Brain MRI to look at structure of tissue and then will proceed to Brain PET Scan (agreed up by Virgil and his daughter).     C. Encouraged long term blood sugar control.    E. Weight reduction- under 28 BMI goal.     F. The MIND Diet reviewed.     G .Alzheimer's Packet given to daughter and Alzheimer's Connect form filled out today.     Total time spent today or this patient's care was 30  minutes  and included reviewing  the diagnostic workup to date (such as labs and imaging as well as interpreting such tests relevant to this patient's neurological condition),  reviewing/obtaining separately obtained history (from patient and/or accompanying ffamily member)  for today's neurological problem(s) ,counseling and educating the patient and family member on issues related to cognition/memory and cognitive health factors and documenting  the clinical information in the EMR.        Blaine Owens MD  [facility].   [facility]

## 2022-09-13 ENCOUNTER — HOSPITAL ENCOUNTER (OUTPATIENT)
Dept: RADIOLOGY | Facility: MEDICAL CENTER | Age: 72
End: 2022-09-13
Attending: PSYCHIATRY & NEUROLOGY
Payer: MEDICARE

## 2022-09-13 DIAGNOSIS — F03.B0 MODERATE DEMENTIA WITHOUT BEHAVIORAL DISTURBANCE (HCC): ICD-10-CM

## 2022-09-13 PROCEDURE — 700102 HCHG RX REV CODE 250 W/ 637 OVERRIDE(OP): Performed by: RADIOLOGY

## 2022-09-13 PROCEDURE — 70551 MRI BRAIN STEM W/O DYE: CPT

## 2022-09-13 PROCEDURE — A9270 NON-COVERED ITEM OR SERVICE: HCPCS | Performed by: RADIOLOGY

## 2022-09-13 RX ORDER — DIAZEPAM 5 MG/1
5 TABLET ORAL
Status: COMPLETED | OUTPATIENT
Start: 2022-09-13 | End: 2022-09-13

## 2022-09-13 RX ADMIN — DIAZEPAM 5 MG: 5 TABLET ORAL at 11:03

## (undated) DEVICE — BANDAGE ROLL STERILE BULKEE 4.5 IN X 4 YD (100EA/CA)

## (undated) DEVICE — GLOVE BIOGEL SZ 8 SURGICAL PF LTX - (50PR/BX 4BX/CA)

## (undated) DEVICE — SENSOR SPO2 NEO LNCS ADHESIVE (20/BX) SEE USER NOTES

## (undated) DEVICE — GOWN SURGEONS X-LARGE - DISP. (30/CA)

## (undated) DEVICE — TOWELS CLOTH SURGICAL - (4/PK 20PK/CA)

## (undated) DEVICE — NEPTUNE 4 PORT MANIFOLD - (20/PK)

## (undated) DEVICE — GLOVE BIOGEL SZ 7 SURGICAL PF LTX - (50PR/BX 4BX/CA)

## (undated) DEVICE — PROTECTOR ULNA NERVE - (36PR/CA)

## (undated) DEVICE — ARMREST CRADLE FOAM - (2PR/PK 12PR/CA)

## (undated) DEVICE — SUTURE 4-0 MONOCRYL PLUS PS-1 - 27 INCH (36/BX)

## (undated) DEVICE — SET EXTENSION WITH 2 PORTS (48EA/CA) ***PART #2C8610 IS A SUBSTITUTE*****

## (undated) DEVICE — SUTURE 0 VICRYL PLUS CT-1 - 8 X 18 INCH (12/BX)

## (undated) DEVICE — CHLORAPREP 26 ML APPLICATOR - ORANGE TINT(25/CA)

## (undated) DEVICE — ELECTRODE DUAL RETURN W/ CORD - (50/PK)

## (undated) DEVICE — BOVIE BLADE COATED &INSULATED - 25/PK

## (undated) DEVICE — DERMABOND ADVANCED - (12EA/BX)

## (undated) DEVICE — KIT ANESTHESIA W/CIRCUIT & 3/LT BAG W/FILTER (20EA/CA)

## (undated) DEVICE — GLOVE BIOGEL INDICATOR SZ 8 SURGICAL PF LTX - (50/BX 4BX/CA)

## (undated) DEVICE — DRAPE LAPAROTOMY T SHEET - (12EA/CA)

## (undated) DEVICE — LACTATED RINGERS INJ 1000 ML - (14EA/CA 60CA/PF)

## (undated) DEVICE — MIDAS LUBRICATOR DIFFUSER PACK (4EA/CA)

## (undated) DEVICE — TUBING C&T SET FLYING LEADS DRAIN TUBING (10EA/BX)

## (undated) DEVICE — SUTURE 2-0 VICRYL PLUS CT-1 - 8 X 18 INCH(12/BX)

## (undated) DEVICE — GLOVE BIOGEL PI ORTHO SZ 6 1/2 SURGICAL PF LF (40PR/BX)

## (undated) DEVICE — GVL 3 STAT DISPOSABLE - (10/BX)

## (undated) DEVICE — HEADREST PRONEVIEW LARGE - (10/CA)

## (undated) DEVICE — TUBING CLEARLINK DUO-VENT - C-FLO (48EA/CA)

## (undated) DEVICE — PACK NEURO - (2EA/CA)

## (undated) DEVICE — TOOL DISSECTING BALL SYMMETRI MIDAS REX LEGEND 14CM 4MM

## (undated) DEVICE — SUTURE GENERAL

## (undated) DEVICE — GLOVE BIOGEL PI INDICATOR SZ 6.5 SURGICAL PF LF - (50/BX 4BX/CA)

## (undated) DEVICE — KIT ROOM DECONTAMINATION

## (undated) DEVICE — ELECTRODE 850 FOAM ADHESIVE - HYDROGEL RADIOTRNSPRNT (50/PK)

## (undated) DEVICE — GLOVE BIOGEL PI INDICATOR SZ 7.0 SURGICAL PF LF - (50/BX 4BX/CA)

## (undated) DEVICE — KIT GEL-FLOW NT ABORBABLE GELATIN (6EA/BX)

## (undated) DEVICE — TOOL DISSECT MATCH HEAD

## (undated) DEVICE — MASK ANESTHESIA ADULT  - (100/CA)

## (undated) DEVICE — GLOVE SZ 6.5 BIOGEL PI MICRO - PF LF (50PR/BX)

## (undated) DEVICE — SUTURE 2-0 VICRYL PLUS SH - 8 X 18 INCH (12/BX)

## (undated) DEVICE — SET LEADWIRE 5 LEAD BEDSIDE DISPOSABLE ECG (1SET OF 5/EA)

## (undated) DEVICE — LACTATED RINGERS INJ. 500 ML - (24EA/CA)

## (undated) DEVICE — CANISTER SUCTION 3000ML MECHANICAL FILTER AUTO SHUTOFF MEDI-VAC NONSTERILE LF DISP  (40EA/CA)

## (undated) DEVICE — SODIUM CHL IRRIGATION 0.9% 1000ML (12EA/CA)

## (undated) DEVICE — SLEEVE, VASO, THIGH, MED

## (undated) DEVICE — SUCTION INSTRUMENT YANKAUER BULBOUS TIP W/O VENT (50EA/CA)